# Patient Record
Sex: FEMALE | Race: BLACK OR AFRICAN AMERICAN | NOT HISPANIC OR LATINO | Employment: FULL TIME | URBAN - METROPOLITAN AREA
[De-identification: names, ages, dates, MRNs, and addresses within clinical notes are randomized per-mention and may not be internally consistent; named-entity substitution may affect disease eponyms.]

---

## 2022-11-30 ENCOUNTER — TELEPHONE (OUTPATIENT)
Dept: ENDOCRINOLOGY | Facility: CLINIC | Age: 42
End: 2022-11-30
Payer: COMMERCIAL

## 2022-11-30 NOTE — TELEPHONE ENCOUNTER
----- Message from Carlee Peña sent at 11/30/2022  1:21 PM CST -----  Regarding: Appt Access  Contact: self [phone]  Pt has appt scheduled for 01/11/2023, requesting sooner appt due to the fact she may run out of insulin before then.  No avail appts.  Please call.

## 2022-12-20 ENCOUNTER — OFFICE VISIT (OUTPATIENT)
Dept: ENDOCRINOLOGY | Facility: CLINIC | Age: 42
End: 2022-12-20
Payer: COMMERCIAL

## 2022-12-20 VITALS
HEART RATE: 89 BPM | SYSTOLIC BLOOD PRESSURE: 118 MMHG | WEIGHT: 189 LBS | DIASTOLIC BLOOD PRESSURE: 76 MMHG | TEMPERATURE: 98 F

## 2022-12-20 DIAGNOSIS — E10.9 TYPE 1 DIABETES MELLITUS WITHOUT COMPLICATION: Primary | ICD-10-CM

## 2022-12-20 DIAGNOSIS — E10.649 HYPOGLYCEMIA UNAWARENESS ASSOCIATED WITH TYPE 1 DIABETES MELLITUS: ICD-10-CM

## 2022-12-20 DIAGNOSIS — E88.819 INSULIN RESISTANCE: ICD-10-CM

## 2022-12-20 PROCEDURE — 3078F PR MOST RECENT DIASTOLIC BLOOD PRESSURE < 80 MM HG: ICD-10-PCS | Mod: CPTII,S$GLB,, | Performed by: NURSE PRACTITIONER

## 2022-12-20 PROCEDURE — 1160F RVW MEDS BY RX/DR IN RCRD: CPT | Mod: CPTII,S$GLB,, | Performed by: NURSE PRACTITIONER

## 2022-12-20 PROCEDURE — 3078F DIAST BP <80 MM HG: CPT | Mod: CPTII,S$GLB,, | Performed by: NURSE PRACTITIONER

## 2022-12-20 PROCEDURE — 99204 OFFICE O/P NEW MOD 45 MIN: CPT | Mod: S$GLB,,, | Performed by: NURSE PRACTITIONER

## 2022-12-20 PROCEDURE — 1160F PR REVIEW ALL MEDS BY PRESCRIBER/CLIN PHARMACIST DOCUMENTED: ICD-10-PCS | Mod: CPTII,S$GLB,, | Performed by: NURSE PRACTITIONER

## 2022-12-20 PROCEDURE — 99999 PR PBB SHADOW E&M-EST. PATIENT-LVL III: ICD-10-PCS | Mod: PBBFAC,,, | Performed by: NURSE PRACTITIONER

## 2022-12-20 PROCEDURE — 3074F SYST BP LT 130 MM HG: CPT | Mod: CPTII,S$GLB,, | Performed by: NURSE PRACTITIONER

## 2022-12-20 PROCEDURE — 1159F MED LIST DOCD IN RCRD: CPT | Mod: CPTII,S$GLB,, | Performed by: NURSE PRACTITIONER

## 2022-12-20 PROCEDURE — 1159F PR MEDICATION LIST DOCUMENTED IN MEDICAL RECORD: ICD-10-PCS | Mod: CPTII,S$GLB,, | Performed by: NURSE PRACTITIONER

## 2022-12-20 PROCEDURE — 3074F PR MOST RECENT SYSTOLIC BLOOD PRESSURE < 130 MM HG: ICD-10-PCS | Mod: CPTII,S$GLB,, | Performed by: NURSE PRACTITIONER

## 2022-12-20 PROCEDURE — 99204 PR OFFICE/OUTPT VISIT, NEW, LEVL IV, 45-59 MIN: ICD-10-PCS | Mod: S$GLB,,, | Performed by: NURSE PRACTITIONER

## 2022-12-20 PROCEDURE — 99999 PR PBB SHADOW E&M-EST. PATIENT-LVL III: CPT | Mod: PBBFAC,,, | Performed by: NURSE PRACTITIONER

## 2022-12-20 RX ORDER — INSULIN GLARGINE 100 [IU]/ML
20 INJECTION, SOLUTION SUBCUTANEOUS 2 TIMES DAILY
COMMUNITY
End: 2022-12-20 | Stop reason: SDUPTHER

## 2022-12-20 RX ORDER — GLUCAGON 3 MG/1
POWDER NASAL
Qty: 2 EACH | Refills: 1 | Status: SHIPPED | OUTPATIENT
Start: 2022-12-20 | End: 2022-12-21 | Stop reason: SDUPTHER

## 2022-12-20 RX ORDER — PEN NEEDLE, DIABETIC 30 GX3/16"
1 NEEDLE, DISPOSABLE MISCELLANEOUS
Qty: 550 EACH | Refills: 3 | Status: SHIPPED | OUTPATIENT
Start: 2022-12-20 | End: 2022-12-21 | Stop reason: SDUPTHER

## 2022-12-20 RX ORDER — INSULIN HUMAN 4-8-12(60)
KIT INHALATION
COMMUNITY
End: 2022-12-21 | Stop reason: SDUPTHER

## 2022-12-20 RX ORDER — INSULIN PUMP SYRINGE, 3 ML
EACH MISCELLANEOUS
Qty: 1 EACH | Refills: 0 | Status: SHIPPED | OUTPATIENT
Start: 2022-12-20 | End: 2022-12-21 | Stop reason: SDUPTHER

## 2022-12-20 RX ORDER — INSULIN ASPART INJECTION 100 [IU]/ML
INJECTION, SOLUTION SUBCUTANEOUS
Qty: 15 PEN | Refills: 5 | Status: SHIPPED | OUTPATIENT
Start: 2022-12-20 | End: 2022-12-21 | Stop reason: SDUPTHER

## 2022-12-20 RX ORDER — INSULIN ASPART INJECTION 100 [IU]/ML
INJECTION, SOLUTION SUBCUTANEOUS
COMMUNITY
End: 2022-12-20 | Stop reason: SDUPTHER

## 2022-12-20 RX ORDER — INSULIN GLARGINE 100 [IU]/ML
INJECTION, SOLUTION SUBCUTANEOUS
Qty: 15 ML | Refills: 5 | Status: SHIPPED | OUTPATIENT
Start: 2022-12-20 | End: 2022-12-21 | Stop reason: SDUPTHER

## 2022-12-20 RX ORDER — LANCETS
EACH MISCELLANEOUS
Qty: 150 EACH | Refills: 11 | Status: SHIPPED | OUTPATIENT
Start: 2022-12-20 | End: 2022-12-21 | Stop reason: SDUPTHER

## 2022-12-20 NOTE — PROGRESS NOTES
CC: This 42 y.o. Black or  female  is here for evaluation of  T1DM along with comorbidities indicated in the Visit Diagnosis section of this encounter.    HPI: Leona Tyler was diagnosed with T1DM at age 9. She did use Medtronic insulin pump shortly after but stopped d/t poor glucose control and frequent severe hypoglycemia.     DM COMPLICATIONS: none    New to Endocrine. Presents as a self-referral, 19 minutes late.   She recently moved down from [place]. She was under Endocrine care in [place].   She is rx'd Fiasp ac and Affrezza pc if BG after meal is > 240. Does not feel Fiasp controls BGs well d/t fast and prominent spike after meals. Ran out of Fiasp and has been using Afrezza ac/pc x 2 weeks now. However, BGs are high without Fiasp. Admits diet since moving back here to [place] has been poor, has been taking much higher doses of Fiasp at 10-30 units ac, averages 24 units.   She has gained 20 lb in the last month.     LAST DIABETES EDUCATION: yes     HOSPITALIZED FOR DIABETES  -  Yes - for DKA several ~ 2008 while on insulin pump.    SIGNIFICANT DIABETES MED HISTORY:    PRESCRIBED DIABETES MEDICATIONS:   Basaglar 20 units AM and 30 units PM   Fiasp ac based off ICR of 10  Afrezza 4-12 units if BG after meal is high     Misses medication doses - no but takes Fiasp late as 1-2 hours after meals, especially since work is busy.       SELF MONITORING BLOOD GLUCOSE: Checks blood glucose at home with [employer]ibr[employer]ano[employer] CGM benjie. Gets from [employer].   See Media for report.   CGM interpretation: BGs highly fluctuating with BGs often in the 50s overnight likely d/t aggressive basal insulin dose. Markedly high spikes after lunch, admittedly her biggest meal.     HYPOGLYCEMIC EPISODES: symptoms sometimes do not start until BG is in 20s.   Corrects with high chew candy, keeps glucose handy.      CURRENT DIET: eats 3 meals/day. Drinks water.   Breakfast - fruit, veggie omelette. Does not feel comfortable carb counting.    Biggest meal is lunch.   No snacks after dinner, occasional snacks during the day but not routinely.       CURRENT EXERCISE: sporadic     SOCIAL: has 3 kids, 6 yo and 3 yo twins.     /76   Pulse 89   Temp 98.1 °F (36.7 °C)   Wt 85.7 kg (189 lb)     ROS:   CONSTITUTIONAL: Appetite good, +  fatigue  SKIN: No rash or pruritis   EYES: No visual disturbances  RESPIRATORY: No shortness of breath or cough  CARDIAC: No chest pain or palpitations  GI: No nausea, vomiting, or diarrhea  : No urinary frequency or dysuria   MS: No arthralgias or mylagias   NEURO: +  paresthesias left hand   OTHER: denies polydipsia         PHYSICAL EXAM:  GENERAL: Well developed, well nourished. No acute distress.   PSYCH: AAOx3, appropriate mood and affect, conversant, well-groomed. Judgement and insight good.   NEURO: Cranial nerves grossly intact. Speech clear, no tremor.   NECK: Trachea midline, no thyromegaly or lymphadenopathy.   CHEST: Respirations even and unlabored. CTA bilaterally.  CARDIOVASCULAR: Regular rate and rhythm. No bruits. No murmur. No edema.   ABDOMEN: Soft, non-tender, non-distended. Bowel sounds present.   MS: Gait steady. No clubbing.   SKIN: Normal skin turgor. Skin warm and dry. No areas of breakdown. + nuchal  acanthosis nigricans.        No results found for: HGBA1C    No results found for: CPEPTIDE, GLUTAMICACID, ISLETCELLANT, FRUCTOSAMINE     No results found for: CHOL  No results found for: HDL  No results found for: LDLCALC  No results found for: TRIG  No results found for: CHOLHDL      Chemistry    No results found for: NA, K, CL, CO2, BUN, CREATININE, GLU No results found for: CALCIUM, ALKPHOS, AST, ALT, BILITOT, ESTGFRAFRICA, EGFRNONAA           No results found for: LABMICR, CREATRANDUR, MICALBCREAT          ASSESSMENT and PLAN:    A1C GOAL: < 7 % if no hypoglycemia     1. Type 1 diabetes mellitus without complication  Patient with type 1 DM with insulin resistance, indicated by high insulin  needs and + nuchal acanthosis nigricans.     Advised pt:   Biggest barrier to diabetes control is high fat/high carb diet. Encouraged patient to improve eating habits.       Use carb ratio of 7; correction factor 20 with goal of 120. - handout provided to explain how to calculate in further detail.     Download Calorie Osman benjie to help with carb counting. An insulin dose calculator benjie was also suggested.   Reduce Basaglar dose by 20%, so take 20 units twice daily.   Discussed changing Fiasp back to Novolog but pt decided to stay on Fiasp.       Return to clinic in 3 months with labs prior. She declines getting a1c done today. Will request records from last Endocrine provider.     Hemoglobin A1C    Microalbumin/Creatinine Ratio, Urine    Lipid Panel    Creatinine, Serum    TSH    Celiac Disease Panel    C-Peptide    Glucose, Fasting    Glutamic Acid Decarboxylase    Anti-islet cell antibody      2. Hypoglycemia unawareness associated with type 1 diabetes mellitus  glucagon (BAQSIMI) 3 mg/actuation Spry  Continue CGM with low alerts.   Reduce Basaglar as above.       3. Insulin resistance  As above          Orders Placed This Encounter   Procedures    Hemoglobin A1C     Standing Status:   Future     Standing Expiration Date:   2/18/2024    Microalbumin/Creatinine Ratio, Urine     Standing Status:   Future     Standing Expiration Date:   2/18/2024     Order Specific Question:   Specimen Source     Answer:   Urine    Lipid Panel     Standing Status:   Future     Standing Expiration Date:   12/20/2023    Creatinine, Serum     Standing Status:   Future     Standing Expiration Date:   2/18/2024    TSH     Standing Status:   Future     Standing Expiration Date:   2/18/2024    Celiac Disease Panel     Standing Status:   Future     Standing Expiration Date:   2/18/2024    C-Peptide     Standing Status:   Future     Standing Expiration Date:   2/18/2024    Glucose, Fasting     Standing Status:   Future     Standing Expiration  Date:   2/18/2024    Glutamic Acid Decarboxylase     Standing Status:   Future     Standing Expiration Date:   2/18/2024    Anti-islet cell antibody     Standing Status:   Future     Standing Expiration Date:   2/18/2024        Follow up in about 3 months (around 3/20/2023).     Thank you very much for allowing me to participate in Leona Tyler's care.

## 2022-12-20 NOTE — PATIENT INSTRUCTIONS
Reduce Basaglar dose by 20%, so take 20 units twice daily.   Discussed changing Fiasp back to Novolog but pt decided to stay on Fiasp.     Use carb ratio of 7; correction factor 20 with goal of 120.     Download [employer] benjie to help with carb counting.     Biggest barrier to diabetes control is high fat/high carb diet. Encouraged patient to improve eating habits.     Return to clinic in 3 months with labs prior.

## 2022-12-21 ENCOUNTER — PATIENT MESSAGE (OUTPATIENT)
Dept: ENDOCRINOLOGY | Facility: CLINIC | Age: 42
End: 2022-12-21
Payer: COMMERCIAL

## 2022-12-21 DIAGNOSIS — E10.649 HYPOGLYCEMIA UNAWARENESS ASSOCIATED WITH TYPE 1 DIABETES MELLITUS: Primary | ICD-10-CM

## 2022-12-21 DIAGNOSIS — E10.9 TYPE 1 DIABETES MELLITUS WITHOUT COMPLICATION: Primary | ICD-10-CM

## 2022-12-21 RX ORDER — INSULIN ASPART INJECTION 100 [IU]/ML
INJECTION, SOLUTION SUBCUTANEOUS
Qty: 35 PEN | Refills: 1 | Status: SHIPPED | OUTPATIENT
Start: 2022-12-21 | End: 2023-01-19 | Stop reason: SDUPTHER

## 2022-12-21 RX ORDER — GLUCAGON 3 MG/1
POWDER NASAL
Qty: 2 EACH | Refills: 1 | Status: SHIPPED | OUTPATIENT
Start: 2022-12-21

## 2022-12-21 RX ORDER — INSULIN PUMP SYRINGE, 3 ML
EACH MISCELLANEOUS
Qty: 1 EACH | Refills: 0 | Status: SHIPPED | OUTPATIENT
Start: 2022-12-21 | End: 2023-02-28

## 2022-12-21 RX ORDER — PEN NEEDLE, DIABETIC 30 GX3/16"
1 NEEDLE, DISPOSABLE MISCELLANEOUS
Qty: 550 EACH | Refills: 3 | Status: SHIPPED | OUTPATIENT
Start: 2022-12-21 | End: 2023-09-26 | Stop reason: SDUPTHER

## 2022-12-21 RX ORDER — INSULIN GLARGINE 100 [IU]/ML
INJECTION, SOLUTION SUBCUTANEOUS
Qty: 30 ML | Refills: 1 | Status: SHIPPED | OUTPATIENT
Start: 2022-12-21 | End: 2023-06-13 | Stop reason: SDUPTHER

## 2022-12-21 RX ORDER — INSULIN HUMAN 4-8-12(60)
KIT INHALATION
Status: CANCELLED | OUTPATIENT
Start: 2022-12-21

## 2022-12-21 RX ORDER — INSULIN HUMAN 4-8-12(60)
KIT INHALATION
Qty: 3 EACH | Refills: 1 | Status: SHIPPED | OUTPATIENT
Start: 2022-12-21 | End: 2023-09-26

## 2022-12-21 RX ORDER — LANCETS
EACH MISCELLANEOUS
Qty: 150 EACH | Refills: 11 | Status: SHIPPED | OUTPATIENT
Start: 2022-12-21 | End: 2023-02-28

## 2022-12-26 ENCOUNTER — PATIENT MESSAGE (OUTPATIENT)
Dept: ENDOCRINOLOGY | Facility: CLINIC | Age: 42
End: 2022-12-26
Payer: COMMERCIAL

## 2023-01-09 ENCOUNTER — PATIENT MESSAGE (OUTPATIENT)
Dept: DIABETES | Facility: CLINIC | Age: 43
End: 2023-01-09
Payer: COMMERCIAL

## 2023-01-19 DIAGNOSIS — E10.649 HYPOGLYCEMIA UNAWARENESS ASSOCIATED WITH TYPE 1 DIABETES MELLITUS: ICD-10-CM

## 2023-01-19 RX ORDER — INSULIN ASPART INJECTION 100 [IU]/ML
INJECTION, SOLUTION SUBCUTANEOUS
Qty: 35 PEN | Refills: 1 | Status: SHIPPED | OUTPATIENT
Start: 2023-01-19 | End: 2023-09-26 | Stop reason: SDUPTHER

## 2023-01-21 ENCOUNTER — PATIENT MESSAGE (OUTPATIENT)
Dept: ENDOCRINOLOGY | Facility: CLINIC | Age: 43
End: 2023-01-21
Payer: COMMERCIAL

## 2023-01-21 DIAGNOSIS — E10.649 HYPOGLYCEMIA UNAWARENESS ASSOCIATED WITH TYPE 1 DIABETES MELLITUS: ICD-10-CM

## 2023-02-24 ENCOUNTER — TELEPHONE (OUTPATIENT)
Dept: ENDOCRINOLOGY | Facility: CLINIC | Age: 43
End: 2023-02-24
Payer: COMMERCIAL

## 2023-02-24 DIAGNOSIS — E10.649 HYPOGLYCEMIA UNAWARENESS ASSOCIATED WITH TYPE 1 DIABETES MELLITUS: ICD-10-CM

## 2023-02-24 NOTE — TELEPHONE ENCOUNTER
----- Message from Raquel Peguero NP sent at 2/23/2023  4:31 PM CST -----  Orders for tslim pump received. I will hold off on signing these until she is cleared by Diabetes Education after insulin pump evaluation.     She has appt for Education soon but that's not for insulin pump eval. She should reschedule.

## 2023-02-24 NOTE — TELEPHONE ENCOUNTER
Spoke to patient, evaluation appointment scheduled.  Pt states that she wasn't able to get her strips, would like the prescription resent to [facility] pharmacy

## 2023-02-24 NOTE — TELEPHONE ENCOUNTER
----- Message from Brigette Martinez sent at 2/24/2023 11:56 AM CST -----  Regarding: Return Call  .Type:  Patient Returning Call    Who Called Self     Who Left Message for Patient: PRASANNA    Does the patient know what this is regarding?: NO    Would the patient rather a call back or a response via [facility]? CALL    Best Call Back Number: .[phone]

## 2023-02-27 ENCOUNTER — PATIENT MESSAGE (OUTPATIENT)
Dept: ENDOCRINOLOGY | Facility: CLINIC | Age: 43
End: 2023-02-27
Payer: COMMERCIAL

## 2023-02-27 DIAGNOSIS — E10.649 HYPOGLYCEMIA UNAWARENESS ASSOCIATED WITH TYPE 1 DIABETES MELLITUS: ICD-10-CM

## 2023-02-28 RX ORDER — INSULIN PUMP SYRINGE, 3 ML
EACH MISCELLANEOUS
Qty: 1 EACH | Refills: 0 | Status: SHIPPED | OUTPATIENT
Start: 2023-02-28 | End: 2024-02-28

## 2023-02-28 RX ORDER — LANCETS
EACH MISCELLANEOUS
Qty: 150 EACH | Refills: 11 | Status: SHIPPED | OUTPATIENT
Start: 2023-02-28

## 2023-03-01 ENCOUNTER — TELEPHONE (OUTPATIENT)
Dept: DIABETES | Facility: CLINIC | Age: 43
End: 2023-03-01
Payer: COMMERCIAL

## 2023-03-16 ENCOUNTER — TELEPHONE (OUTPATIENT)
Dept: ENDOCRINOLOGY | Facility: CLINIC | Age: 43
End: 2023-03-16
Payer: COMMERCIAL

## 2023-03-16 DIAGNOSIS — E10.649 HYPOGLYCEMIA UNAWARENESS ASSOCIATED WITH TYPE 1 DIABETES MELLITUS: ICD-10-CM

## 2023-03-16 NOTE — TELEPHONE ENCOUNTER
----- Message from Shikha Ulises sent at 3/16/2023  2:56 PM CDT -----  Regarding: Brigette Hogan  .Type: Patient Call Back    Who called: Brigette Hogan    What is the request in detail: Requesting to get a new rx stating what type of blood sugar diagnostic Strp pt needs due to guidelines     Can the clinic reply by [facility]? Call back     Would the patient rather a call back or a response via [facility]? Call back     Best call back number .  [phone]

## 2023-03-17 ENCOUNTER — TELEPHONE (OUTPATIENT)
Dept: ENDOCRINOLOGY | Facility: CLINIC | Age: 43
End: 2023-03-17
Payer: COMMERCIAL

## 2023-03-21 ENCOUNTER — TELEPHONE (OUTPATIENT)
Dept: ENDOCRINOLOGY | Facility: CLINIC | Age: 43
End: 2023-03-21
Payer: COMMERCIAL

## 2023-03-21 NOTE — TELEPHONE ENCOUNTER
----- Message from Raquel Peguero NP sent at 3/21/2023  2:12 PM CDT -----  Contact: [facility]  opt [address] ref# [id number]  Please clarify with pharmacy that pt uses both Fiasp and Afrezza. Fiasp is taken before meals and Afrezza is taken for high glucoses.   ----- Message -----  From: Veto Castaneda LPN  Sent: 3/21/2023  11:23 AM CDT  To: Raquel Peguero NP      ----- Message -----  From: Kenia Burr  Sent: 3/21/2023  11:08 AM CDT  To: Marielena García Staff    Pharmacy is calling to clarify an RX.  RX name:  insulin regular human (AFREZZA) 4 unit/8 unit/ 12 unit (60) CtDv  What do they need to clarify:  duplicate therapy with insulin aspart, niacinamide, (FIASP FLEXTOUCH U-100 INSULIN) 100 unit/mL (3 mL) InPn  Comments:

## 2023-03-21 NOTE — TELEPHONE ENCOUNTER
Spoke to [facility] pharmacy, informed that the pt is on both Afreeza and Fiasp

## 2023-04-06 ENCOUNTER — OFFICE VISIT (OUTPATIENT)
Dept: ENDOCRINOLOGY | Facility: CLINIC | Age: 43
End: 2023-04-06
Payer: COMMERCIAL

## 2023-04-06 VITALS
HEART RATE: 77 BPM | SYSTOLIC BLOOD PRESSURE: 123 MMHG | DIASTOLIC BLOOD PRESSURE: 85 MMHG | WEIGHT: 189 LBS | TEMPERATURE: 98 F

## 2023-04-06 DIAGNOSIS — E10.9 TYPE 1 DIABETES MELLITUS WITHOUT COMPLICATION: Primary | ICD-10-CM

## 2023-04-06 DIAGNOSIS — E10.649 HYPOGLYCEMIA UNAWARENESS ASSOCIATED WITH TYPE 1 DIABETES MELLITUS: ICD-10-CM

## 2023-04-06 PROCEDURE — 1160F PR REVIEW ALL MEDS BY PRESCRIBER/CLIN PHARMACIST DOCUMENTED: ICD-10-PCS | Mod: CPTII,S$GLB,, | Performed by: NURSE PRACTITIONER

## 2023-04-06 PROCEDURE — 99214 OFFICE O/P EST MOD 30 MIN: CPT | Mod: S$GLB,,, | Performed by: NURSE PRACTITIONER

## 2023-04-06 PROCEDURE — 99214 PR OFFICE/OUTPT VISIT, EST, LEVL IV, 30-39 MIN: ICD-10-PCS | Mod: S$GLB,,, | Performed by: NURSE PRACTITIONER

## 2023-04-06 PROCEDURE — 1160F RVW MEDS BY RX/DR IN RCRD: CPT | Mod: CPTII,S$GLB,, | Performed by: NURSE PRACTITIONER

## 2023-04-06 PROCEDURE — 3079F PR MOST RECENT DIASTOLIC BLOOD PRESSURE 80-89 MM HG: ICD-10-PCS | Mod: CPTII,S$GLB,, | Performed by: NURSE PRACTITIONER

## 2023-04-06 PROCEDURE — 1159F PR MEDICATION LIST DOCUMENTED IN MEDICAL RECORD: ICD-10-PCS | Mod: CPTII,S$GLB,, | Performed by: NURSE PRACTITIONER

## 2023-04-06 PROCEDURE — 99999 PR PBB SHADOW E&M-EST. PATIENT-LVL III: CPT | Mod: PBBFAC,,, | Performed by: NURSE PRACTITIONER

## 2023-04-06 PROCEDURE — 99999 PR PBB SHADOW E&M-EST. PATIENT-LVL III: ICD-10-PCS | Mod: PBBFAC,,, | Performed by: NURSE PRACTITIONER

## 2023-04-06 PROCEDURE — 3074F SYST BP LT 130 MM HG: CPT | Mod: CPTII,S$GLB,, | Performed by: NURSE PRACTITIONER

## 2023-04-06 PROCEDURE — 3074F PR MOST RECENT SYSTOLIC BLOOD PRESSURE < 130 MM HG: ICD-10-PCS | Mod: CPTII,S$GLB,, | Performed by: NURSE PRACTITIONER

## 2023-04-06 PROCEDURE — 3079F DIAST BP 80-89 MM HG: CPT | Mod: CPTII,S$GLB,, | Performed by: NURSE PRACTITIONER

## 2023-04-06 PROCEDURE — 1159F MED LIST DOCD IN RCRD: CPT | Mod: CPTII,S$GLB,, | Performed by: NURSE PRACTITIONER

## 2023-04-06 RX ORDER — BLOOD-GLUCOSE SENSOR
EACH MISCELLANEOUS
Qty: 3 EACH | Refills: 11 | Status: SHIPPED | OUTPATIENT
Start: 2023-04-06 | End: 2023-06-16 | Stop reason: SDUPTHER

## 2023-04-06 RX ORDER — BLOOD-GLUCOSE TRANSMITTER
EACH MISCELLANEOUS
Qty: 1 EACH | Refills: 3 | Status: SHIPPED | OUTPATIENT
Start: 2023-04-06 | End: 2023-06-16 | Stop reason: SDUPTHER

## 2023-04-06 NOTE — PROGRESS NOTES
CC: This 42 y.o. Black or  female  is here for evaluation of  T1DM along with comorbidities indicated in the Visit Diagnosis section of this encounter.    HPI: Leona Tyler was diagnosed with T1DM at age 9. She did use Medtronic insulin pump shortly after but stopped d/t poor glucose control and frequent severe hypoglycemia.     DM COMPLICATIONS: none    Initial visit 12/2022  New to Endocrine. Presents as a self-referral, 19 minutes late.   She recently moved down from [place]. She was under Endocrine care in [place].   She is rx'd Fiasp ac and Affrezza pc if BG after meal is > 240. Does not feel Fiasp controls BGs well d/t fast and prominent spike after meals. Ran out of Fiasp and has been using Afrezza ac/pc x 2 weeks now. However, BGs are high without Fiasp. Admits diet since moving back here to [place] has been poor, has been taking much higher doses of Fiasp at 10-30 units ac, averages 24 units.   She has gained 20 lb in the last month.   Plan Patient with type 1 DM with insulin resistance, indicated by high insulin needs and + nuchal acanthosis nigricans.   Advised pt:   Biggest barrier to diabetes control is high fat/high carb diet. Encouraged patient to improve eating habits.   Use carb ratio of 7; correction factor 20 with goal of 120. - handout provided to explain how to calculate in further detail.   Download Calorie Osman benjie to help with carb counting. An insulin dose calculator benjie was also suggested.   Reduce Basaglar dose by 20%, so take 20 units twice daily.   Discussed changing Fiasp back to Novolog but pt decided to stay on Fiasp.   Return to clinic in 3 months with labs prior. She declines getting a1c done today. Will request records from last Endocrine provider.     Interval hx  No recent labs available d/t cost. Requests labs at [facility] She does not know what her BGs have been. Finally got her supplies last week but still has not been testing BGs.     She is trying to control her  food portions better, choosing more vegetables/lower carb options.         LAST DIABETES EDUCATION: yes     HOSPITALIZED FOR DIABETES  -  Yes - for DKA several times; last episode was ~ 2008 while on insulin pump.    SIGNIFICANT DIABETES MED HISTORY:    PRESCRIBED DIABETES MEDICATIONS:   Basaglar 40 units hs    Fiasp ac based off ICR of 4  Afrezza 4-12 units if BG after meal is high > 220      Misses medication doses - denies       SELF MONITORING BLOOD GLUCOSE:  previously used Libre2 CGM with phone benjie, stopped when supplies ran out.   Has the One Touch Verio Reflect meter.     HYPOGLYCEMIC EPISODES: more frequent now with lifestyle changes. Earlier this week she felt her BG was low but didn't test BG     + hypoglycemia unawareness; symptoms sometimes do not start until BG is in 20s.   Corrects with high chew candy, keeps glucose handy.      Glucagon kit at home - yes, Baqsimi     CURRENT DIET: eats 3 meals/day. Drinks water.   does not feel comfortable carb counting.   Biggest meal is lunch.   No snacks after dinner, occasional snacks during the day but not routinely.       CURRENT EXERCISE: tries to walk 10k steps per day, workout videos 3x/week      SOCIAL: has 3 kids, 6 yo and 3 yo twins.       /85   Pulse 77   Temp 98.2 °F (36.8 °C)   Wt 85.7 kg (189 lb)     ROS:   CONSTITUTIONAL: Appetite good, +  fatigue - improved   Other: denies polydipsia, no dysuria         PHYSICAL EXAM:  GENERAL: Well developed, well nourished. No acute distress.   PSYCH: AAOx3, appropriate mood and affect, conversant, well-groomed. Judgement and insight good.   NEURO: Cranial nerves grossly intact. Speech clear, no tremor.   CHEST: Respirations even and unlabored.     No results found for: HGBA1C    No results found for: CPEPTIDE, GLUTAMICACID, ISLETCELLANT, FRUCTOSAMINE     No results found for: CHOL  No results found for: HDL  No results found for: LDLCALC  No results found for: TRIG  No results found for: CHOLHDL       Chemistry    No results found for: NA, K, CL, CO2, BUN, CREATININE, GLU No results found for: CALCIUM, ALKPHOS, AST, ALT, BILITOT, ESTGFRAFRICA, EGFRNONAA           No results found for: LABMICR, CREATRANDUR, MICALBCREAT          ASSESSMENT and PLAN:    A1C GOAL: < 7 % if no hypoglycemia     1. Type 1 diabetes mellitus without complication  Labs today ordered for [facility].   Orders for Dexcom G6 sent to [facility]. Contact office if unable to obtain.   Needs to be on CGM with low alert asap due to h/o hypoglycemia unawareness.   Until CGM can be started do make sure to test glucose at least 4x/day.     See Diabetes Education for insulin pump evaluation.   Unable to evaluate glucose control and adjust insulin therapy since no glucose data or a1c is available.     Keep appointment with Danita Lipscomb, diabetes NP, in [place] in May and continue diabetes management there.  -- more convenient location to patient.       Glutamic Acid Decarboxylase    C-Peptide    Glucose, Fasting    HEMOGLOBIN A1C    TSH    Lipid Panel    Microalbumin/Creatinine Ratio, Urine    Glutamic Acid Decarboxylase    C-Peptide    Glucose, Fasting    HEMOGLOBIN A1C    TSH    Lipid Panel    Microalbumin/Creatinine Ratio, Urine      2. Hypoglycemia unawareness associated with type 1 diabetes mellitus  As above              Orders Placed This Encounter   Procedures    Glutamic Acid Decarboxylase     Standing Status:   Future     Number of Occurrences:   1     Standing Expiration Date:   6/4/2024    C-Peptide     Standing Status:   Future     Number of Occurrences:   1     Standing Expiration Date:   6/4/2024    Glucose, Fasting     Standing Status:   Future     Number of Occurrences:   1     Standing Expiration Date:   6/4/2024    HEMOGLOBIN A1C     Standing Status:   Future     Number of Occurrences:   1     Standing Expiration Date:   6/4/2024    TSH     Standing Status:   Future     Number of Occurrences:   1     Standing Expiration Date:   6/4/2024     Lipid Panel     Standing Status:   Future     Number of Occurrences:   1     Standing Expiration Date:   4/5/2024    Microalbumin/Creatinine Ratio, Urine     Standing Status:   Future     Number of Occurrences:   1     Standing Expiration Date:   6/4/2024     Order Specific Question:   Specimen Source     Answer:   Urine        No follow-ups on file.

## 2023-04-06 NOTE — PATIENT INSTRUCTIONS
Labs today ordered for [facility].   Orders for Dexcom G6 sent to [facility]. Contact office if unable to obtain.   Needs to be on CGM with low alert asap due to h/o hypoglycemia unawareness.   Until CGM can be started do make sure to test glucose at least 4x/day.     See Diabetes Education for insulin pump evaluation.   Unable to evaluate glucose control and adjust insulin therapy since no glucose data or a1c is available.     Keep appointment with Danita Lipscomb, diabetes NP, in [place] in May and continue diabetes management there.  -- more convenient location to patient.

## 2023-05-01 ENCOUNTER — OCCUPATIONAL HEALTH (OUTPATIENT)
Dept: URGENT CARE | Facility: CLINIC | Age: 43
End: 2023-05-01
Payer: COMMERCIAL

## 2023-05-01 DIAGNOSIS — Z13.9 ENCOUNTER FOR SCREENING: Primary | ICD-10-CM

## 2023-05-01 PROCEDURE — 80305 DRUG TEST PRSMV DIR OPT OBS: CPT | Mod: S$GLB,,, | Performed by: PHYSICIAN ASSISTANT

## 2023-05-01 PROCEDURE — 80305 MEDTOX HAIR COLLECTION ONLY: ICD-10-PCS | Mod: S$GLB,,, | Performed by: PHYSICIAN ASSISTANT

## 2023-05-18 ENCOUNTER — OCCUPATIONAL HEALTH (OUTPATIENT)
Dept: URGENT CARE | Facility: CLINIC | Age: 43
End: 2023-05-18

## 2023-05-18 DIAGNOSIS — Z13.9 ENCOUNTER FOR SCREENING: Primary | ICD-10-CM

## 2023-05-18 PROCEDURE — 80305 DRUG TEST PRSMV DIR OPT OBS: CPT | Mod: S$GLB,,, | Performed by: PHYSICIAN ASSISTANT

## 2023-05-18 PROCEDURE — 80305 MEDTOX HAIR COLLECTION ONLY: ICD-10-PCS | Mod: S$GLB,,, | Performed by: PHYSICIAN ASSISTANT

## 2023-06-05 ENCOUNTER — TELEPHONE (OUTPATIENT)
Dept: OTOLARYNGOLOGY | Facility: CLINIC | Age: 43
End: 2023-06-05
Payer: COMMERCIAL

## 2023-06-05 DIAGNOSIS — M79.641 PAIN IN BOTH HANDS: Primary | ICD-10-CM

## 2023-06-05 DIAGNOSIS — M79.642 PAIN IN BOTH HANDS: Primary | ICD-10-CM

## 2023-06-06 ENCOUNTER — OFFICE VISIT (OUTPATIENT)
Dept: ORTHOPEDICS | Facility: CLINIC | Age: 43
End: 2023-06-06
Payer: COMMERCIAL

## 2023-06-06 VITALS
DIASTOLIC BLOOD PRESSURE: 68 MMHG | WEIGHT: 191.13 LBS | HEIGHT: 67 IN | SYSTOLIC BLOOD PRESSURE: 96 MMHG | HEART RATE: 85 BPM | BODY MASS INDEX: 30 KG/M2

## 2023-06-06 DIAGNOSIS — M65.331 TRIGGER FINGER, RIGHT MIDDLE FINGER: Primary | ICD-10-CM

## 2023-06-06 PROCEDURE — 3078F DIAST BP <80 MM HG: CPT | Mod: CPTII,S$GLB,,

## 2023-06-06 PROCEDURE — 3074F PR MOST RECENT SYSTOLIC BLOOD PRESSURE < 130 MM HG: ICD-10-PCS | Mod: CPTII,S$GLB,,

## 2023-06-06 PROCEDURE — 99999 PR PBB SHADOW E&M-EST. PATIENT-LVL III: ICD-10-PCS | Mod: PBBFAC,,,

## 2023-06-06 PROCEDURE — 1159F MED LIST DOCD IN RCRD: CPT | Mod: CPTII,S$GLB,,

## 2023-06-06 PROCEDURE — 99203 PR OFFICE/OUTPT VISIT, NEW, LEVL III, 30-44 MIN: ICD-10-PCS | Mod: 25,S$GLB,,

## 2023-06-06 PROCEDURE — 3008F PR BODY MASS INDEX (BMI) DOCUMENTED: ICD-10-PCS | Mod: CPTII,S$GLB,,

## 2023-06-06 PROCEDURE — 20550 NJX 1 TENDON SHEATH/LIGAMENT: CPT | Mod: RT,S$GLB,,

## 2023-06-06 PROCEDURE — 3008F BODY MASS INDEX DOCD: CPT | Mod: CPTII,S$GLB,,

## 2023-06-06 PROCEDURE — 3078F PR MOST RECENT DIASTOLIC BLOOD PRESSURE < 80 MM HG: ICD-10-PCS | Mod: CPTII,S$GLB,,

## 2023-06-06 PROCEDURE — 20550 PR INJECT TENDON SHEATH/LIGAMENT: ICD-10-PCS | Mod: RT,S$GLB,,

## 2023-06-06 PROCEDURE — 99203 OFFICE O/P NEW LOW 30 MIN: CPT | Mod: 25,S$GLB,,

## 2023-06-06 PROCEDURE — 1159F PR MEDICATION LIST DOCUMENTED IN MEDICAL RECORD: ICD-10-PCS | Mod: CPTII,S$GLB,,

## 2023-06-06 PROCEDURE — 3074F SYST BP LT 130 MM HG: CPT | Mod: CPTII,S$GLB,,

## 2023-06-06 PROCEDURE — 99999 PR PBB SHADOW E&M-EST. PATIENT-LVL III: CPT | Mod: PBBFAC,,,

## 2023-06-06 RX ORDER — METHYLPREDNISOLONE ACETATE 40 MG/ML
40 INJECTION, SUSPENSION INTRA-ARTICULAR; INTRALESIONAL; INTRAMUSCULAR; SOFT TISSUE
Status: COMPLETED | OUTPATIENT
Start: 2023-06-06 | End: 2023-06-06

## 2023-06-06 RX ORDER — LANCETS 33 GAUGE
EACH MISCELLANEOUS
COMMUNITY
Start: 2023-03-14

## 2023-06-06 RX ADMIN — METHYLPREDNISOLONE ACETATE 40 MG: 40 INJECTION, SUSPENSION INTRA-ARTICULAR; INTRALESIONAL; INTRAMUSCULAR; SOFT TISSUE at 09:06

## 2023-06-06 RX ADMIN — METHYLPREDNISOLONE ACETATE 40 MG: 40 INJECTION, SUSPENSION INTRA-ARTICULAR; INTRALESIONAL; INTRAMUSCULAR; SOFT TISSUE at 08:06

## 2023-06-06 NOTE — PROCEDURES
Right midddle trigger finger #1    Date/Time: 6/6/2023 8:30 AM  Performed by: Denise Michel PA-C  Authorized by: Denise Michel PA-C     Consent Done?:  Yes (Verbal)  Indications:  Pain  Site marked: the procedure site was marked    Timeout: prior to procedure the correct patient, procedure, and site was verified    Prep: patient was prepped and draped in usual sterile fashion      Local anesthesia used?: Yes    Local anesthetic:  Bupivacaine 0.25% without epinephrine and topical anesthetic (Topical spray prior to injection and 1cc 1% plain lidocaine in the injectate)  Anesthetic total (ml):  1    Location:  Long finger  Ultrasonic guidance for needle placement?: No    Needle size:  25 G  Approach:  Volar  Medications:  40 mg carpal tunnel/trigger finger  Patient tolerance:  Patient tolerated the procedure well with no immediate complications

## 2023-06-06 NOTE — PROGRESS NOTES
SUBJECTIVE:      Chief Complaint: right middle trigger finger    History of Present Illness:  Patient is a 42 y.o. right hand dominant female who presents today with complaints of right middle trigger finger.  Patient reports a history this her left hand as well as carpal tunnel syndrome.   Reports this started about 6 months ago and has progressively worsened.  No known MILEY.  Reports her middle finger intermittently locks and she has pain at the base of her finger.  She is a diabetic, last A1c unknown.  She is interested in an injection today.     The patient is a/an .    Onset of symptoms/DOI was 6 months prior.    Symptoms are aggravated by movement.    Symptoms are alleviated by rest.    Symptoms consist of locking and pain.    The patient rates their pain as a 4/10.    Attempted treatment(s) and/or interventions include activity modifications, rest, rest.     The patient denies any fevers, chills, N/V, D/C and presents for evaluation.       Past Medical History:   Diagnosis Date    Diabetes mellitus type I      Past Surgical History:   Procedure Laterality Date    CARPAL TUNNEL RELEASE Right      SECTION      MYOMECTOMY       Review of patient's allergies indicates:  No Known Allergies  Social History     Social History Narrative    ** Merged History Encounter **          Family History   Problem Relation Age of Onset    Diabetes Mother         type 2    Diabetes Father         type 1    Diabetes Maternal Grandmother         type 2    Diabetes Paternal Grandmother         type 1         Current Outpatient Medications:     blood sugar diagnostic Strp, To check BG 4 times daily, to use with insurance preferred meter, Disp: 400 each, Rfl: 3    blood-glucose meter kit, To check BG 4 times daily, to use with insurance preferred meter, Disp: 1 each, Rfl: 0    blood-glucose sensor (DEXCOM G6 SENSOR) Janny, Change sensor every 10 days, Disp: 3 each, Rfl: 11    blood-glucose transmitter (DEXCOM G6  "TRANSMITTER) Janny, Change every 3 months, Disp: 1 each, Rfl: 3    glucagon (BAQSIMI) 3 mg/actuation Spry, Use as needed for severe hypoglycemia, Disp: 2 each, Rfl: 1    insulin (BASAGLAR KWIKPEN U-100 INSULIN) glargine 100 units/mL SubQ pen, Inject 20 units twice daily, Disp: 30 mL, Rfl: 1    insulin aspart, niacinamide, (FIASP FLEXTOUCH U-100 INSULIN) 100 unit/mL (3 mL) InPn, Inject before meals based off carb ratio 7, correction factor 20 and glucose goal 120. Max daily dose 120 units, Disp: 35 pen, Rfl: 1    insulin regular human (AFREZZA) 4 unit/8 unit/ 12 unit (60) CtDv, Inhale 4-16 units three times daily and additional units as needed for glucose control. TDD 48 units, Disp: 3 each, Rfl: 1    lancets Misc, To check BG 4 times daily, to use with insurance preferred meter, Disp: 150 each, Rfl: 11    pen needle, diabetic (BD ULTRA-FINE IZZY PEN NEEDLE) 32 gauge x 5/32" Ndle, 1 Device by Misc.(Non-Drug; Combo Route) route 6 (six) times daily., Disp: 550 each, Rfl: 3      Review of Systems:  Constitutional: no fever or chills  Eyes: no visual changes  ENT: no nasal congestion or sore throat  Respiratory: no cough or shortness of breath  Cardiovascular: no chest pain  Gastrointestinal: no nausea or vomiting, tolerating diet  Musculoskeletal: pain and soreness    OBJECTIVE:      Vital Signs (Most Recent):  There were no vitals filed for this visit.  There is no height or weight on file to calculate BMI.      Physical Exam:  Constitutional: The patient appears well-developed and well-nourished. No distress.   Skin: No lesions appreciated  Head: Normocephalic and atraumatic.   Nose: Nose normal.   Ears: No deformities seen  Eyes: Conjunctivae and EOM are normal.   Neck: No tracheal deviation present.   Cardiovascular: Normal rate and intact distal pulses.    Pulmonary/Chest: Effort normal. No respiratory distress.   Abdominal: There is no guarding.   Neurological: The patient is alert.   Psychiatric: The patient has a " normal mood and affect.     Right Hand/Wrist Examination:    Observation/Inspection:  Swelling  none    Deformity  none  Discoloration  none     Scars   none    Atrophy  none    HAND/WRIST EXAMINATION:  Finkelstein's Test   Neg  WHAT Test    Neg  Snuff box tenderness   Neg  Scott's Test    Neg  Hook of Hamate Tenderness  Neg  CMC grind    Neg  Circumduction test   Neg  TTP at long finger A1 pulley    Neurovascular Exam:  Digits WWP, brisk CR < 3s throughout  NVI motor/LTS to M/R/U nerves, radial pulse 2+  Tinel's Test - Carpal Tunnel  Neg  Tinel's Test - Cubital Tunnel  Neg  Phalen's Test    Neg  Median Nerve Compression Test Neg    ROM hand full, pain with long finger extension, locking noted on exam    ROM wrist full, painless    ROM elbow full, painless    Abdomen not guarded  Respirations nonlabored  Perfusion intact    Diagnostic Results:     Imaging - I independently viewed the patient's imaging as well as the radiology report.  Xrays of the patient's bilateral hands  demonstrate no evidence of any obvious acute fractures or dislocations or significant degenerative changes.      ASSESSMENT/PLAN:      1. Trigger finger, right middle finger  methylPREDNISolone acetate injection 40 mg    right midddle trigger finger #1            I made the decision to obtain old records of the patient including previous notes and imaging. If new imaging was ordered today of the extremity or extremities evaluated. I independently reviewed and interpreted the x-rays as well as prior imaging. Reviewed imaging in detail with patient.     We discussed at length different treatment options including conservative vs surgical management. These include anti-inflammatories, acetaminophen, rest, ice, heat, formal physical therapy including strengthening and stretching exercises, home exercise programs, injections, and finally surgical intervention.      We will proceed today with trial of right middle finger injection.  Trigger finger  injection given.  Post-injection instructions reviewed. Patient was advised to monitor her blood sugars closely over the next several days. The steroid may cause a rise in them. If her glucose levels rise to a point she is uncomfortable or she is unable to control them she is to contact her PCP or go immediately to the emergency department.     She was also given an oval 8 splint to wear as tolerated.  She will contact the clinic if she would like to proceed with either trigger finger release or carpal tunnel release as needed.    Follow up: prn  Xrays needed: none     All of the patient's questions were answered and the patient will contact us if they have any questions or concerns in the interim.

## 2023-06-12 ENCOUNTER — PATIENT MESSAGE (OUTPATIENT)
Dept: ENDOCRINOLOGY | Facility: CLINIC | Age: 43
End: 2023-06-12
Payer: COMMERCIAL

## 2023-06-12 DIAGNOSIS — E10.649 HYPOGLYCEMIA UNAWARENESS ASSOCIATED WITH TYPE 1 DIABETES MELLITUS: ICD-10-CM

## 2023-06-14 RX ORDER — INSULIN GLARGINE 100 [IU]/ML
INJECTION, SOLUTION SUBCUTANEOUS
Qty: 30 ML | Refills: 1 | Status: SHIPPED | OUTPATIENT
Start: 2023-06-14 | End: 2023-09-26

## 2023-06-16 ENCOUNTER — TELEPHONE (OUTPATIENT)
Dept: PHARMACY | Facility: CLINIC | Age: 43
End: 2023-06-16
Payer: COMMERCIAL

## 2023-06-16 ENCOUNTER — OFFICE VISIT (OUTPATIENT)
Dept: ENDOCRINOLOGY | Facility: CLINIC | Age: 43
End: 2023-06-16
Payer: COMMERCIAL

## 2023-06-16 VITALS
SYSTOLIC BLOOD PRESSURE: 117 MMHG | BODY MASS INDEX: 30.07 KG/M2 | DIASTOLIC BLOOD PRESSURE: 75 MMHG | WEIGHT: 192 LBS | TEMPERATURE: 99 F | HEART RATE: 89 BPM

## 2023-06-16 DIAGNOSIS — E10.9 TYPE 1 DIABETES MELLITUS WITHOUT COMPLICATION: Primary | ICD-10-CM

## 2023-06-16 DIAGNOSIS — E10.649 HYPOGLYCEMIA UNAWARENESS ASSOCIATED WITH TYPE 1 DIABETES MELLITUS: ICD-10-CM

## 2023-06-16 PROCEDURE — 99214 PR OFFICE/OUTPT VISIT, EST, LEVL IV, 30-39 MIN: ICD-10-PCS | Mod: S$GLB,,, | Performed by: NURSE PRACTITIONER

## 2023-06-16 PROCEDURE — 1159F PR MEDICATION LIST DOCUMENTED IN MEDICAL RECORD: ICD-10-PCS | Mod: CPTII,S$GLB,, | Performed by: NURSE PRACTITIONER

## 2023-06-16 PROCEDURE — 3008F PR BODY MASS INDEX (BMI) DOCUMENTED: ICD-10-PCS | Mod: CPTII,S$GLB,, | Performed by: NURSE PRACTITIONER

## 2023-06-16 PROCEDURE — 1160F PR REVIEW ALL MEDS BY PRESCRIBER/CLIN PHARMACIST DOCUMENTED: ICD-10-PCS | Mod: CPTII,S$GLB,, | Performed by: NURSE PRACTITIONER

## 2023-06-16 PROCEDURE — 1160F RVW MEDS BY RX/DR IN RCRD: CPT | Mod: CPTII,S$GLB,, | Performed by: NURSE PRACTITIONER

## 2023-06-16 PROCEDURE — 3078F DIAST BP <80 MM HG: CPT | Mod: CPTII,S$GLB,, | Performed by: NURSE PRACTITIONER

## 2023-06-16 PROCEDURE — 3008F BODY MASS INDEX DOCD: CPT | Mod: CPTII,S$GLB,, | Performed by: NURSE PRACTITIONER

## 2023-06-16 PROCEDURE — 3078F PR MOST RECENT DIASTOLIC BLOOD PRESSURE < 80 MM HG: ICD-10-PCS | Mod: CPTII,S$GLB,, | Performed by: NURSE PRACTITIONER

## 2023-06-16 PROCEDURE — 99999 PR PBB SHADOW E&M-EST. PATIENT-LVL IV: CPT | Mod: PBBFAC,,, | Performed by: NURSE PRACTITIONER

## 2023-06-16 PROCEDURE — 1159F MED LIST DOCD IN RCRD: CPT | Mod: CPTII,S$GLB,, | Performed by: NURSE PRACTITIONER

## 2023-06-16 PROCEDURE — 3074F SYST BP LT 130 MM HG: CPT | Mod: CPTII,S$GLB,, | Performed by: NURSE PRACTITIONER

## 2023-06-16 PROCEDURE — 99214 OFFICE O/P EST MOD 30 MIN: CPT | Mod: S$GLB,,, | Performed by: NURSE PRACTITIONER

## 2023-06-16 PROCEDURE — 3074F PR MOST RECENT SYSTOLIC BLOOD PRESSURE < 130 MM HG: ICD-10-PCS | Mod: CPTII,S$GLB,, | Performed by: NURSE PRACTITIONER

## 2023-06-16 PROCEDURE — 99999 PR PBB SHADOW E&M-EST. PATIENT-LVL IV: ICD-10-PCS | Mod: PBBFAC,,, | Performed by: NURSE PRACTITIONER

## 2023-06-16 RX ORDER — BLOOD-GLUCOSE TRANSMITTER
EACH MISCELLANEOUS
Qty: 1 EACH | Refills: 3 | Status: SHIPPED | OUTPATIENT
Start: 2023-06-16

## 2023-06-16 RX ORDER — BLOOD-GLUCOSE SENSOR
EACH MISCELLANEOUS
Qty: 3 EACH | Refills: 11 | Status: SHIPPED | OUTPATIENT
Start: 2023-06-16

## 2023-06-16 NOTE — PROGRESS NOTES
CC: This 42 y.o. Black or  female  is here for evaluation of  T1DM along with comorbidities indicated in the Visit Diagnosis section of this encounter.    HPI: Leona Tyler was diagnosed with T1DM at age 9. She did use Medtronic insulin pump shortly after but stopped d/t poor glucose control and frequent severe hypoglycemia.   She was under Endocrine care in [place]. Moved to [place] in late 2022.   DM COMPLICATIONS: none        Prior visit 4/2023  No recent labs available d/t cost. Requests labs at [facility]. She does not know what her BGs have been. Finally got her supplies last week but still has not been testing BGs.   She is trying to control her food portions better, choosing more vegetables/lower carb options.   Plan Labs today ordered for [facility].   Orders for Dexcom G6 sent to [facility]. Contact office if unable to obtain.   Needs to be on CGM with low alert asap due to h/o hypoglycemia unawareness.   Until CGM can be started do make sure to test glucose at least 4x/day.   See Diabetes Education for insulin pump evaluation.   Unable to evaluate glucose control and adjust insulin therapy since no glucose data or a1c is available.   Keep appointment with Danita Lipscomb, diabetes NP, in [place] in May and continue diabetes management there.  -- more convenient location to patient.     Interval hx  Pt was scheduled to see Danita Lipscomb NP, in [place] in May, closer to where pt lives. But appt was cancelled for unclear reason. She would like to continue DM care here.     She had a severe hypoglycemic episode earlier this week.   Glucose dropped to 46 abruptly in the afternoon and she became confused. For lunch she had chicken tenders and salad, prelunch BG was 200s. -- she took 12 units for this meal when generally she would've taken 4 units (2 units for 8 grams CHO and 2 units for high BG) but she took extra Fiasp that day because she was out of Basaglar x 2 days.     She was unable to get  Dexcom from [employer].   Has not sen diabetes educator yet for insulin pump evaluation.       LAST DIABETES EDUCATION: yes     HOSPITALIZED FOR DIABETES  -  Yes - for DKA several times; last episode was ~ 2008 while on insulin pump.    SIGNIFICANT DIABETES MED HISTORY:    PRESCRIBED DIABETES MEDICATIONS:   Basaglar 40 units hs    Fiasp ac based off ICR of 4  Afrezza 4-12 units if BG after meal is high > 220      Misses medication doses - denies       SELF MONITORING BLOOD GLUCOSE:  previously used Libre2 CGM with phone benjie  Currently uses the One Touch Verio Reflect meter.     HYPOGLYCEMIC EPISODES:     + hypoglycemia unawareness; symptoms sometimes do not start until BG is in 20s.   Corrects with high chew candy, keeps glucose handy.      Glucagon kit at home - yes, Baqsimi     CURRENT DIET: eats 3 meals/day. Drinks water.   does not feel comfortable with carb counting.   Biggest meal is lunch.   No snacks after dinner, occasional snacks during the day but not routinely.       CURRENT EXERCISE: tries to walk 10k steps per day, workout videos 3x/week      SOCIAL: has 3 kids -  4 yo, and 3 yo twins.       /75   Pulse 89   Temp 98.8 °F (37.1 °C)   Wt 87.1 kg (192 lb)   LMP 05/26/2023 (Exact Date)   BMI 30.07 kg/m²     ROS:   CONSTITUTIONAL: Appetite good, +  fatigue - improved   Other: denies polydipsia, no dysuria         PHYSICAL EXAM:  GENERAL: Well developed, well nourished. No acute distress.   PSYCH: AAOx3, appropriate mood and affect, conversant, well-groomed. Judgement and insight good.   NEURO: Cranial nerves grossly intact. Speech clear, no tremor.   CHEST: Respirations even and unlabored.     No results found for: HGBA1C    Lab Results   Component Value Date    CPEPTIDE <0.08 (L) 06/06/2023    ISLETCELLANT 1:512 (H) 06/06/2023      Lab Results   Component Value Date    TSH 1.880 06/06/2023         No results found for: CHOL  No results found for: HDL  No results found for: LDLCALC  No results  found for: TRIG  No results found for: CHOLHDL      Chemistry        Component Value Date/Time    CREATININE 0.9 06/06/2023 0932    No results found for: CALCIUM, ALKPHOS, AST, ALT, BILITOT, ESTGFRAFRICA, EGFRNONAA           No results found for: LABMICR, CREATRANDUR, MICALBCREAT          ASSESSMENT and PLAN:    A1C GOAL: < 7 % if no hypoglycemia         1. Type 1 diabetes mellitus without complication  Encouraged pt to avoid running out of insulin. Clarified that extra Fiasp should NOT be taken if she runs out of basal insulin or else BG can drop too low.  Needs to be on CGM with low alert asap due to h/o hypoglycemia unawareness.   Rx for Dexcom G6 sent to [facility].   Appt made for Diabetes Education for insulin pump eval.   A1c today and again prior to next visit in August, in about 6 weeks.       2. Hypoglycemia unawareness associated with type 1 diabetes mellitus                No orders of the defined types were placed in this encounter.       Follow up in about 6 weeks (around 7/28/2023).

## 2023-06-20 ENCOUNTER — PATIENT MESSAGE (OUTPATIENT)
Dept: ENDOCRINOLOGY | Facility: CLINIC | Age: 43
End: 2023-06-20
Payer: COMMERCIAL

## 2023-06-22 ENCOUNTER — OFFICE VISIT (OUTPATIENT)
Dept: OBSTETRICS AND GYNECOLOGY | Facility: CLINIC | Age: 43
End: 2023-06-22
Payer: COMMERCIAL

## 2023-06-22 VITALS
WEIGHT: 214.5 LBS | HEART RATE: 98 BPM | SYSTOLIC BLOOD PRESSURE: 126 MMHG | DIASTOLIC BLOOD PRESSURE: 66 MMHG | HEIGHT: 67 IN | BODY MASS INDEX: 33.67 KG/M2

## 2023-06-22 DIAGNOSIS — Z12.31 ENCOUNTER FOR SCREENING MAMMOGRAM FOR MALIGNANT NEOPLASM OF BREAST: ICD-10-CM

## 2023-06-22 DIAGNOSIS — Z30.09 ENCOUNTER FOR COUNSELING REGARDING CONTRACEPTION: Primary | ICD-10-CM

## 2023-06-22 DIAGNOSIS — N93.9 ABNORMAL UTERINE BLEEDING (AUB): ICD-10-CM

## 2023-06-22 LAB
B-HCG UR QL: NEGATIVE
CTP QC/QA: YES

## 2023-06-22 PROCEDURE — 81025 POCT URINE PREGNANCY: ICD-10-PCS | Mod: S$GLB,,, | Performed by: NURSE PRACTITIONER

## 2023-06-22 PROCEDURE — 1159F PR MEDICATION LIST DOCUMENTED IN MEDICAL RECORD: ICD-10-PCS | Mod: CPTII,S$GLB,, | Performed by: NURSE PRACTITIONER

## 2023-06-22 PROCEDURE — 87624 HPV HI-RISK TYP POOLED RSLT: CPT | Performed by: NURSE PRACTITIONER

## 2023-06-22 PROCEDURE — 3078F DIAST BP <80 MM HG: CPT | Mod: CPTII,S$GLB,, | Performed by: NURSE PRACTITIONER

## 2023-06-22 PROCEDURE — 99999 PR PBB SHADOW E&M-EST. PATIENT-LVL IV: ICD-10-PCS | Mod: PBBFAC,,, | Performed by: NURSE PRACTITIONER

## 2023-06-22 PROCEDURE — 81514 NFCT DS BV&VAGINITIS DNA ALG: CPT | Performed by: NURSE PRACTITIONER

## 2023-06-22 PROCEDURE — 88175 CYTOPATH C/V AUTO FLUID REDO: CPT | Performed by: NURSE PRACTITIONER

## 2023-06-22 PROCEDURE — 1159F MED LIST DOCD IN RCRD: CPT | Mod: CPTII,S$GLB,, | Performed by: NURSE PRACTITIONER

## 2023-06-22 PROCEDURE — 3008F PR BODY MASS INDEX (BMI) DOCUMENTED: ICD-10-PCS | Mod: CPTII,S$GLB,, | Performed by: NURSE PRACTITIONER

## 2023-06-22 PROCEDURE — 99386 PR PREVENTIVE VISIT,NEW,40-64: ICD-10-PCS | Mod: S$GLB,,, | Performed by: NURSE PRACTITIONER

## 2023-06-22 PROCEDURE — 3074F PR MOST RECENT SYSTOLIC BLOOD PRESSURE < 130 MM HG: ICD-10-PCS | Mod: CPTII,S$GLB,, | Performed by: NURSE PRACTITIONER

## 2023-06-22 PROCEDURE — 99386 PREV VISIT NEW AGE 40-64: CPT | Mod: S$GLB,,, | Performed by: NURSE PRACTITIONER

## 2023-06-22 PROCEDURE — 3074F SYST BP LT 130 MM HG: CPT | Mod: CPTII,S$GLB,, | Performed by: NURSE PRACTITIONER

## 2023-06-22 PROCEDURE — 3078F PR MOST RECENT DIASTOLIC BLOOD PRESSURE < 80 MM HG: ICD-10-PCS | Mod: CPTII,S$GLB,, | Performed by: NURSE PRACTITIONER

## 2023-06-22 PROCEDURE — 99999 PR PBB SHADOW E&M-EST. PATIENT-LVL IV: CPT | Mod: PBBFAC,,, | Performed by: NURSE PRACTITIONER

## 2023-06-22 PROCEDURE — 3008F BODY MASS INDEX DOCD: CPT | Mod: CPTII,S$GLB,, | Performed by: NURSE PRACTITIONER

## 2023-06-22 PROCEDURE — 87591 N.GONORRHOEAE DNA AMP PROB: CPT | Performed by: NURSE PRACTITIONER

## 2023-06-22 PROCEDURE — 81025 URINE PREGNANCY TEST: CPT | Mod: S$GLB,,, | Performed by: NURSE PRACTITIONER

## 2023-06-22 RX ORDER — BROMPHENIRAMINE MALEATE, PSEUDOEPHEDRINE HYDROCHLORIDE, AND DEXTROMETHORPHAN HYDROBROMIDE 2; 30; 10 MG/5ML; MG/5ML; MG/5ML
SYRUP ORAL
COMMUNITY

## 2023-06-22 RX ORDER — NORETHINDRONE ACETATE AND ETHINYL ESTRADIOL 1.5-30(21)
1 KIT ORAL DAILY
Qty: 90 TABLET | Refills: 4 | Status: SHIPPED | OUTPATIENT
Start: 2023-06-22 | End: 2023-06-22 | Stop reason: SDUPTHER

## 2023-06-22 RX ORDER — NORETHINDRONE ACETATE AND ETHINYL ESTRADIOL 1.5-30(21)
1 KIT ORAL DAILY
Qty: 90 TABLET | Refills: 4 | Status: SHIPPED | OUTPATIENT
Start: 2023-06-22 | End: 2024-06-21

## 2023-06-22 RX ORDER — (INSULIN DEGLUDEC AND LIRAGLUTIDE) 100; 3.6 [IU]/ML; MG/ML
INJECTION, SOLUTION SUBCUTANEOUS
COMMUNITY
End: 2023-09-26

## 2023-06-22 RX ORDER — INSULIN HUMAN 4-8-12(60)
KIT INHALATION
COMMUNITY
End: 2023-09-26

## 2023-06-22 RX ORDER — MONTELUKAST SODIUM 10 MG/1
TABLET ORAL
COMMUNITY

## 2023-06-22 RX ORDER — LEVOFLOXACIN 750 MG/1
TABLET ORAL
COMMUNITY
End: 2024-01-16 | Stop reason: ALTCHOICE

## 2023-06-22 RX ORDER — NORETHINDRONE ACETATE AND ETHINYL ESTRADIOL .03; 1.5 MG/1; MG/1
TABLET ORAL
COMMUNITY
End: 2023-06-22

## 2023-06-22 NOTE — PROGRESS NOTES
Chief Complaint: Well Woman Exam     HPI:      Leona Tyler is a 42 y.o.  who presents today for well woman exam.  LMP: Patient's last menstrual period was 2023 (exact date).   Patient is currently sexually active with a single male partner. She is currently using oral contraceptives (estrogen/progesterone) for contraception but ran out of rx 3 months ago. She declines STD screening today. Ms. Tyler confirms that she is safe at home.  Ms. Tyler denies abnormal vaginal discharge, pelvic pain, urinary problems, or changes in appetite.  Menses: monthly. Denies BTB with OCP.    Patient has been out of OCP for 3 months. Reports when not on OCP vag bleeding is very irregular. She will bleeding for 5-7 days then have a 3-5 day break before bleeding again. Reports she has dealt with this her entire life ad was diagnosed with PCOS. She is currently spotting.   Had EMBX in - negative    Denies migraines with aura, HTN, VTE, smoking.     Previous Pap:   normal per pt  (~ 1 year ago per pt)  reports hx of abnormal pap x 1 with colpo in . Denies excisional procedure. All other paps are normal.   No Previous Mammogram    Family History   Problem Relation Age of Onset    Diabetes Mother         type 2    Diabetes Father         type 1    Diabetes Maternal Grandmother         type 2    Diabetes Paternal Grandmother         type 1     OB History          2    Para   2    Term                AB        Living             SAB        IAB        Ectopic        Multiple   1    Live Births   3                 ROS:     GENERAL: Denies unintentional weight gain or weight loss. Feeling well overall.   SKIN: Denies rash or lesions.   BREASTS: Denies pain, lumps, or nipple discharge.   ABDOMEN: Denies abdominal pain, constipation, diarrhea, nausea, vomiting, change in appetite.  URINARY: Denies frequency, dysuria, hematuria.  PSYCHIATRIC: Denies depression, anxiety or mood swings.    Physical Exam:     "  PHYSICAL EXAM:  /66   Pulse 98   Ht 5' 7" (1.702 m)   Wt 97.3 kg (214 lb 8.1 oz)   LMP 05/26/2023 (Exact Date)   BMI 33.60 kg/m²   Body mass index is 33.6 kg/m².     APPEARANCE: Well nourished, well developed, in no acute distress.  PSYCH: Appropriate mood and affect.  ABDOMEN: Soft.  No tenderness or masses.    BREASTS: Symmetrical, no skin changes or visible lesions.  No palpable masses or nipple discharge bilaterally.  PELVIC: Normal external genitalia without lesions.  Normal hair distribution.  Adequate perineal body, normal urethral meatus.  Vagina moist and well rugated without lesions. + blood.  Cervix pink, without lesions, discharge or tenderness.  No significant cystocele or rectocele.  Bimanual exam shows uterus to be normal size, regular, mobile and nontender.  Adnexa without masses or tenderness.      Assessment/Plan:     Encounter for counseling regarding contraception  -     POCT Urine Pregnancy  -     Liquid-Based Pap Smear, Screening  -     HPV High Risk Genotypes, PCR    Abnormal uterine bleeding (AUB)  -     C. trachomatis/N. gonorrhoeae by AMP DNA  -     Vaginosis Screen by DNA Probe  -     US Pelvis Comp with Transvag NON-OB (xpd; Future; Expected date: 06/22/2023    Encounter for screening mammogram for malignant neoplasm of breast  -     Mammo Digital Screening Bilat w/ Vance; Future; Expected date: 06/22/2023    Other orders  -     Discontinue: norethindrone-ethinyl estradiol-iron (JUNEL FE 1.5/30, 28,) 1.5 mg-30 mcg (21)/75 mg (7) tablet; Take 1 tablet by mouth once daily.  Dispense: 90 tablet; Refill: 4  -     norethindrone-ethinyl estradiol-iron (JUNEL FE 1.5/30, 28,) 1.5 mg-30 mcg (21)/75 mg (7) tablet; Take 1 tablet by mouth once daily.  Dispense: 90 tablet; Refill: 4        Counseling:     Patient was counseled today on current ASCCP pap guidelines, the recommendation for yearly pelvic exams, healthy diet and exercise routines, breast self awareness and annual " mammograms.She is to see her PCP for other health maintenance.     Will r/o infection and get pelvic US for AUB.

## 2023-06-23 LAB
BACTERIAL VAGINOSIS DNA: NEGATIVE
C TRACH DNA SPEC QL NAA+PROBE: NOT DETECTED
CANDIDA GLABRATA DNA: NEGATIVE
CANDIDA KRUSEI DNA: NEGATIVE
CANDIDA RRNA VAG QL PROBE: NEGATIVE
N GONORRHOEA DNA SPEC QL NAA+PROBE: NOT DETECTED
T VAGINALIS RRNA GENITAL QL PROBE: NEGATIVE

## 2023-06-26 ENCOUNTER — PATIENT MESSAGE (OUTPATIENT)
Dept: ENDOCRINOLOGY | Facility: CLINIC | Age: 43
End: 2023-06-26
Payer: COMMERCIAL

## 2023-06-26 LAB
HPV HR 12 DNA SPEC QL NAA+PROBE: NEGATIVE
HPV16 AG SPEC QL: NEGATIVE
HPV18 DNA SPEC QL NAA+PROBE: NEGATIVE

## 2023-06-27 LAB
FINAL PATHOLOGIC DIAGNOSIS: NORMAL
Lab: NORMAL

## 2023-06-30 ENCOUNTER — PATIENT MESSAGE (OUTPATIENT)
Dept: ENDOCRINOLOGY | Facility: CLINIC | Age: 43
End: 2023-06-30
Payer: COMMERCIAL

## 2023-07-17 ENCOUNTER — HOSPITAL ENCOUNTER (OUTPATIENT)
Dept: RADIOLOGY | Facility: HOSPITAL | Age: 43
Discharge: HOME | End: 2023-07-17
Attending: NURSE PRACTITIONER
Payer: COMMERCIAL

## 2023-07-17 DIAGNOSIS — Z12.31 ENCOUNTER FOR SCREENING MAMMOGRAM FOR MALIGNANT NEOPLASM OF BREAST: ICD-10-CM

## 2023-07-17 PROCEDURE — 77067 SCR MAMMO BI INCL CAD: CPT | Mod: 26,,, | Performed by: RADIOLOGY

## 2023-07-17 PROCEDURE — 77067 SCR MAMMO BI INCL CAD: CPT | Mod: TC

## 2023-07-17 PROCEDURE — 77063 MAMMO DIGITAL SCREENING BILAT WITH TOMO: ICD-10-PCS | Mod: 26,,, | Performed by: RADIOLOGY

## 2023-07-17 PROCEDURE — 77067 MAMMO DIGITAL SCREENING BILAT WITH TOMO: ICD-10-PCS | Mod: 26,,, | Performed by: RADIOLOGY

## 2023-07-17 PROCEDURE — 77063 BREAST TOMOSYNTHESIS BI: CPT | Mod: 26,,, | Performed by: RADIOLOGY

## 2023-07-21 ENCOUNTER — TELEPHONE (OUTPATIENT)
Dept: OBSTETRICS AND GYNECOLOGY | Facility: CLINIC | Age: 43
End: 2023-07-21
Payer: COMMERCIAL

## 2023-07-31 ENCOUNTER — PATIENT MESSAGE (OUTPATIENT)
Dept: RESEARCH | Facility: HOSPITAL | Age: 43
End: 2023-07-31
Payer: COMMERCIAL

## 2023-07-31 DIAGNOSIS — Z91.89 AT HIGH RISK FOR BREAST CANCER: Primary | ICD-10-CM

## 2023-09-18 ENCOUNTER — PATIENT MESSAGE (OUTPATIENT)
Dept: ORTHOPEDICS | Facility: CLINIC | Age: 43
End: 2023-09-18
Payer: COMMERCIAL

## 2023-09-26 ENCOUNTER — OFFICE VISIT (OUTPATIENT)
Dept: DIABETES | Facility: CLINIC | Age: 43
End: 2023-09-26
Payer: COMMERCIAL

## 2023-09-26 VITALS
BODY MASS INDEX: 29.19 KG/M2 | HEART RATE: 93 BPM | WEIGHT: 186 LBS | SYSTOLIC BLOOD PRESSURE: 124 MMHG | HEIGHT: 67 IN | OXYGEN SATURATION: 98 % | DIASTOLIC BLOOD PRESSURE: 72 MMHG

## 2023-09-26 DIAGNOSIS — Z71.9 HEALTH EDUCATION/COUNSELING: ICD-10-CM

## 2023-09-26 DIAGNOSIS — E10.649 TYPE 1 DIABETES MELLITUS WITH HYPOGLYCEMIA AND WITHOUT COMA: ICD-10-CM

## 2023-09-26 DIAGNOSIS — E10.65 TYPE 1 DIABETES MELLITUS WITH HYPERGLYCEMIA: Primary | ICD-10-CM

## 2023-09-26 DIAGNOSIS — E10.649 HYPOGLYCEMIA UNAWARENESS ASSOCIATED WITH TYPE 1 DIABETES MELLITUS: ICD-10-CM

## 2023-09-26 DIAGNOSIS — E66.3 OVERWEIGHT (BMI 25.0-29.9): ICD-10-CM

## 2023-09-26 PROCEDURE — 3008F BODY MASS INDEX DOCD: CPT | Mod: CPTII,S$GLB,, | Performed by: NURSE PRACTITIONER

## 2023-09-26 PROCEDURE — 1160F RVW MEDS BY RX/DR IN RCRD: CPT | Mod: CPTII,S$GLB,, | Performed by: NURSE PRACTITIONER

## 2023-09-26 PROCEDURE — 3078F PR MOST RECENT DIASTOLIC BLOOD PRESSURE < 80 MM HG: ICD-10-PCS | Mod: CPTII,S$GLB,, | Performed by: NURSE PRACTITIONER

## 2023-09-26 PROCEDURE — 3074F SYST BP LT 130 MM HG: CPT | Mod: CPTII,S$GLB,, | Performed by: NURSE PRACTITIONER

## 2023-09-26 PROCEDURE — 99215 PR OFFICE/OUTPT VISIT, EST, LEVL V, 40-54 MIN: ICD-10-PCS | Mod: S$GLB,,, | Performed by: NURSE PRACTITIONER

## 2023-09-26 PROCEDURE — 1160F PR REVIEW ALL MEDS BY PRESCRIBER/CLIN PHARMACIST DOCUMENTED: ICD-10-PCS | Mod: CPTII,S$GLB,, | Performed by: NURSE PRACTITIONER

## 2023-09-26 PROCEDURE — 3078F DIAST BP <80 MM HG: CPT | Mod: CPTII,S$GLB,, | Performed by: NURSE PRACTITIONER

## 2023-09-26 PROCEDURE — 99215 OFFICE O/P EST HI 40 MIN: CPT | Mod: S$GLB,,, | Performed by: NURSE PRACTITIONER

## 2023-09-26 PROCEDURE — 99999 PR PBB SHADOW E&M-EST. PATIENT-LVL V: CPT | Mod: PBBFAC,,, | Performed by: NURSE PRACTITIONER

## 2023-09-26 PROCEDURE — 95251 CONT GLUC MNTR ANALYSIS I&R: CPT | Mod: S$GLB,,, | Performed by: NURSE PRACTITIONER

## 2023-09-26 PROCEDURE — 95251 PR GLUCOSE MONITOR, 72 HOUR, PHYS INTERP: ICD-10-PCS | Mod: S$GLB,,, | Performed by: NURSE PRACTITIONER

## 2023-09-26 PROCEDURE — 1159F MED LIST DOCD IN RCRD: CPT | Mod: CPTII,S$GLB,, | Performed by: NURSE PRACTITIONER

## 2023-09-26 PROCEDURE — 1159F PR MEDICATION LIST DOCUMENTED IN MEDICAL RECORD: ICD-10-PCS | Mod: CPTII,S$GLB,, | Performed by: NURSE PRACTITIONER

## 2023-09-26 PROCEDURE — 3074F PR MOST RECENT SYSTOLIC BLOOD PRESSURE < 130 MM HG: ICD-10-PCS | Mod: CPTII,S$GLB,, | Performed by: NURSE PRACTITIONER

## 2023-09-26 PROCEDURE — 3008F PR BODY MASS INDEX (BMI) DOCUMENTED: ICD-10-PCS | Mod: CPTII,S$GLB,, | Performed by: NURSE PRACTITIONER

## 2023-09-26 PROCEDURE — 99999 PR PBB SHADOW E&M-EST. PATIENT-LVL V: ICD-10-PCS | Mod: PBBFAC,,, | Performed by: NURSE PRACTITIONER

## 2023-09-26 RX ORDER — PEN NEEDLE, DIABETIC 30 GX3/16"
1 NEEDLE, DISPOSABLE MISCELLANEOUS
Qty: 550 EACH | Refills: 3 | Status: SHIPPED | OUTPATIENT
Start: 2023-09-26 | End: 2024-01-16 | Stop reason: SDUPTHER

## 2023-09-26 RX ORDER — INSULIN GLARGINE 300 U/ML
34 INJECTION, SOLUTION SUBCUTANEOUS NIGHTLY
Qty: 4 PEN | Refills: 11 | Status: SHIPPED | OUTPATIENT
Start: 2023-09-26 | End: 2023-10-18 | Stop reason: SDUPTHER

## 2023-09-26 RX ORDER — INSULIN ASPART INJECTION 100 [IU]/ML
INJECTION, SOLUTION SUBCUTANEOUS
Qty: 35 PEN | Refills: 3 | Status: SHIPPED | OUTPATIENT
Start: 2023-09-26 | End: 2023-10-18 | Stop reason: SDUPTHER

## 2023-09-26 NOTE — ASSESSMENT & PLAN NOTE
Uncontrolled   Goal is to prevent hypoglycemia   May be interested in insulin pump-- will have her meet with diabetes education         -- Medication Changes:   Stop basaglar   Change to Toujeo   Start with Toujeo 34 units daily     GUIDE FOR STARTING LONG-ACTING INSULIN    · Take your blood sugar before breakfast for THREE consecutive days before increasing the dose- self titration:   · If your fasting blood sugar in the morning is between 131-180 for THREE consecutive days, increase the dose by 1 unit.  · If your fasting blood sugar in the morning is > 180 for three consecutive days, increase the dose by 2 units.   · If your fasting blood sugar in the morning is between 80 and 130, continue your current dose.  · If you have ANY blood sugar less than 80, decrease the dose by 2 units.  · If you have ANY blood sugar less than 70, decrease the dose by 4 units and call your physician for assistance with further dose adjustments.      Continue your Fiasp doses for now     Continue dexcom G6     Meet with education for pump evaluation.          -- Reviewed goals of therapy are to get the best control we can without hypoglycemia.  -- Reviewed patient's current insulin regimen. Clarified proper insulin dose and timing in relation to meals, etc. Insulin injection sites and proper rotation instructed.   -- Advised frequent self blood glucose monitoring.  Patient encouraged to document glucose results and bring them to every clinic visit.-- continue dexcom G6 personal CGM- supplies from [employer]   -- Hypoglycemia precautions discussed. Instructed on precautions before driving.    -- Call for Bg repeatedly < 70 or > 200.   -- Close adherence to lifestyle changes recommended.   -- Periodic follow ups for eye evaluations, foot care and dental care suggested.  -- Refer to diabetes education-- insulin pump evaluation. - I gave her brochures today in clinic         Patient has diabetes mellitus and manages diabetes with intensive  insulin regimen and uses prandial and basal insulin daily  Patient requires a therapeutic CGM and is willing to use therapeutic CGM for the necessary frequent adjustments of insulin therapy.  I have completed an in-person visit during the previous 6 months and will continue to have in-person visits every 6 months to assess adherence to their CGM regimen and diabetes treatment plan.  Due to COVID pandemic and need for remote monitoring this tool is medically necessary

## 2023-09-26 NOTE — PROGRESS NOTES
"  CC:   Chief Complaint   Patient presents with    Diabetes Mellitus       HPI: Leona Tyler is a 43 y.o. female presents for an initial visit today for the management of T1DM   This is her first visit with me today.   She was seen previously by GLENN Peguero NP- last seen in June 2023      She was diagnosed with Type 1 diabetes at 9 years old. She did use a Medtronic pump shortly after but stopped d/t poor glucose control and frequent severe hypoglycemia   Moved to [place] in late 2022-- was in [place]       Family hx of diabetes: mother (T2DM)  and father (T1)   Hospitalized for diabetes: Yes - for DKA several times; last episode was ~ 2008 while on insulin pump.  Insulin therapy: att he time of diagnosis     No personal or FH of thyroid cancer or personal of pancreatic cancer or pancreatitis.    Was started on Afreeza in [place]= she feels like since she started using the Afreeza- she has issues with her throat- constant coughing - tickle in the throat       -- federal       Almost 6 year old son   4 year old twins - boys                 DIABETES COMPLICATIONS: none      Diabetes Management Status    ASA:  No    Statin: Not taking  ACE/ARB: Not taking    Screening or Prevention Patient's value Goal Complete/Controlled?   HgA1C Testing and Control   No results found for: "HGBA1C"   Annually/Less than 8% No   Lipid profile Most Recent Lipid Panel Health Maintenance Topic Completion: Not Found Annually No   LDL control No results found for: "LDLCALC" Annually/Less than 100 mg/dl  No   Nephropathy screening No results found for: "LABMICR"  No results found for: "PROTEINUA" Annually No   Blood pressure BP Readings from Last 1 Encounters:   09/26/23 124/72    Less than 140/90 Yes   Dilated retinal exam Most Recent Eye Exam Date: Not Found Annually Yes   Foot exam   : 09/26/2023 Annually No       CURRENT A1C:    No results found for: "HGBA1C"    GOAL A1C: 6.5% without hypoglycemia       DM MEDICATIONS USED " "IN THE PAST: Basaglar   Fiasp   Afreeza   Xultophy   Dexcom G6   Karina 2   Levemir   Lantus   Novolog   Humalog   Regular   NPH       CURRENT DIABETES MEDICATIONS:   Basaglar 40 units hs    Fiasp ac based off ICR of 1:7 -- ISF 20 and target 120  Not taking the Afreeza     Insulin:  pens.    Missed doses: occ missing doses   Sometimes taking the fiasp " way after" eating       BLOOD GLUCOSE MONITORING:    Sensor type: Dexcom G6   Average BG readin  Time in range: 48%  33% high   14% very high   4% low   <1% very low   Estimated A1c is 7.6%    Site change:q10 days    2 weeks prior - BG was 184   44% in range   7.7% estimated A1c       Supplies: DMS       Sensor was downloaded in clinic today and reviewed with patient.   Please see attached document for download.         HYPOGLYCEMIA:  Yes 4% low   <1% very low   2 weeks prior -- 6% low -- 1% very low   Glucagon kit:Baqsimi   Medic alert bracelet: denies         MEALS: eating 3 meals per day   Feels confident with CHO counting   Drinks: water and coffee and green tea        CURRENT EXERCISE:  Yes-- started recently       Review of Systems  Review of Systems   Constitutional:  Negative for appetite change and fatigue.   HENT:  Positive for postnasal drip. Negative for trouble swallowing and voice change.         Itching throat    Eyes:  Negative for visual disturbance.   Respiratory:  Negative for shortness of breath.    Cardiovascular:  Negative for chest pain.   Gastrointestinal:  Negative for nausea.   Endocrine: Negative for polydipsia, polyphagia and polyuria.   Genitourinary:         No Nocturia    Skin:  Negative for wound.   Neurological:  Negative for numbness.       Physical Exam   Physical Exam  Vitals and nursing note reviewed.   Constitutional:       General: She is not in acute distress.     Appearance: She is well-developed. She is not ill-appearing.      Comments: Overweight female    HENT:      Head: Normocephalic and atraumatic.      Right Ear: " External ear normal.      Left Ear: External ear normal.      Nose: Nose normal.   Neck:      Thyroid: No thyromegaly.      Trachea: No tracheal deviation.   Cardiovascular:      Rate and Rhythm: Normal rate and regular rhythm.      Heart sounds: No murmur heard.  Pulmonary:      Effort: Pulmonary effort is normal. No respiratory distress.      Breath sounds: Normal breath sounds.   Abdominal:      Palpations: Abdomen is soft.      Tenderness: There is no abdominal tenderness.      Hernia: No hernia is present.   Musculoskeletal:      Cervical back: Normal range of motion and neck supple.   Skin:     General: Skin is warm and dry.      Capillary Refill: Capillary refill takes less than 2 seconds.      Findings: No rash.      Comments: Injection sites and dexcom  sites are normal appearing. No lipo hypertropthy or atrophy     Neurological:      Mental Status: She is alert and oriented to person, place, and time.      Cranial Nerves: No cranial nerve deficit.   Psychiatric:         Behavior: Behavior normal.         Judgment: Judgment normal.         FOOT EXAMINATION: Appropriate footwear         Lab Results   Component Value Date    TSH 1.880 06/06/2023           Type 1 diabetes mellitus with hyperglycemia  Uncontrolled   Goal is to prevent hypoglycemia   May be interested in insulin pump-- will have her meet with diabetes education         -- Medication Changes:   Stop basaglar   Change to Toujeo   Start with Toujeo 34 units daily     GUIDE FOR STARTING LONG-ACTING INSULIN    Take your blood sugar before breakfast for THREE consecutive days before increasing the dose- self titration:   If your fasting blood sugar in the morning is between 131-180 for THREE consecutive days, increase the dose by 1 unit.  If your fasting blood sugar in the morning is > 180 for three consecutive days, increase the dose by 2 units.   If your fasting blood sugar in the morning is between 80 and 130, continue your current dose.  If you  have ANY blood sugar less than 80, decrease the dose by 2 units.  If you have ANY blood sugar less than 70, decrease the dose by 4 units and call your physician for assistance with further dose adjustments.      Continue your Fiasp doses for now     Continue dexcom G6     Meet with education for pump evaluation.          -- Reviewed goals of therapy are to get the best control we can without hypoglycemia.  -- Reviewed patient's current insulin regimen. Clarified proper insulin dose and timing in relation to meals, etc. Insulin injection sites and proper rotation instructed.   -- Advised frequent self blood glucose monitoring.  Patient encouraged to document glucose results and bring them to every clinic visit.-- continue dexcom G6 personal CGM- supplies from [employer]   -- Hypoglycemia precautions discussed. Instructed on precautions before driving.    -- Call for Bg repeatedly < 70 or > 200.   -- Close adherence to lifestyle changes recommended.   -- Periodic follow ups for eye evaluations, foot care and dental care suggested.  -- Refer to diabetes education-- insulin pump evaluation. - I gave her brochures today in clinic         Patient has diabetes mellitus and manages diabetes with intensive insulin regimen and uses prandial and basal insulin daily  Patient requires a therapeutic CGM and is willing to use therapeutic CGM for the necessary frequent adjustments of insulin therapy.  I have completed an in-person visit during the previous 6 months and will continue to have in-person visits every 6 months to assess adherence to their CGM regimen and diabetes treatment plan.  Due to COVID pandemic and need for remote monitoring this tool is medically necessary          Type 1 diabetes mellitus with hypoglycemia  Reviewed hypoglycemia management: Treat with 1/2 glass of juice, 1/2 can regular coke, or 4 glucose tablets.   Monitor and repeat treatment every 15 minutes until BG is >70   Then have a snack, which includes a  complex carbohydrate and protein.    Has Baqsimi   Recommend medic alert bracelet         Overweight-- Body mass index is 29.13 kg/m².  Increases insulin resistance.   Discussed DM diet and exercise.         I spent a total of 60 minutes on the day of the visit.This includes face to face time and non-face to face time preparing to see the patient (eg, review of tests), obtaining and/or reviewing separately obtained history, documenting clinical information in the electronic or other health record, independently interpreting results and communicating results to the patient/family/caregiver, or care coordinator.        Follow up in about 3 months (around 12/26/2023).  Schedule with isaac with diabetes education-- insulin pump evaluation   Fasting labs now-- when she can   Follow up with me in 3 months     Orders Placed This Encounter   Procedures    Hemoglobin A1C     Standing Status:   Future     Standing Expiration Date:   3/26/2025    Comprehensive Metabolic Panel     Standing Status:   Future     Standing Expiration Date:   3/26/2025    Lipid Panel     Standing Status:   Future     Standing Expiration Date:   3/26/2025    Microalbumin/Creatinine Ratio, Urine     Standing Status:   Future     Standing Expiration Date:   3/26/2025     Order Specific Question:   Specimen Source     Answer:   Urine    TSH     Standing Status:   Future     Standing Expiration Date:   3/26/2025    CBC Auto Differential     Standing Status:   Future     Standing Expiration Date:   3/26/2025    Ambulatory referral/consult to Diabetes Education     Standing Status:   Future     Standing Expiration Date:   3/26/2025     Referral Priority:   Routine     Referral Type:   Consultation     Referral Reason:   Specialty Services Required     Referred to Provider:   Isaac Javier, RD, CDE     Requested Specialty:   Diabetes     Number of Visits Requested:   1       Recommendations were discussed with the patient in detail  The patient verbalized  understanding and agrees with the plan outlined as above.     This note was partly generated with [employer] voice recognition software. I apologize for any possible typographical errors.

## 2023-09-26 NOTE — PATIENT INSTRUCTIONS
Stop basaglar   Change to Toujeo   Start with Toujeo 34 units daily     GUIDE FOR STARTING LONG-ACTING INSULIN    Take your blood sugar before breakfast for THREE consecutive days before increasing the dose- self titration:   If your fasting blood sugar in the morning is between 131-180 for THREE consecutive days, increase the dose by 1 unit.  If your fasting blood sugar in the morning is > 180 for three consecutive days, increase the dose by 2 units.   If your fasting blood sugar in the morning is between 80 and 130, continue your current dose.  If you have ANY blood sugar less than 80, decrease the dose by 2 units.  If you have ANY blood sugar less than 70, decrease the dose by 4 units and call your physician for assistance with further dose adjustments.      Continue your Fiasp doses for now     Continue dexcom G6     Meet with education for pump evaluation.

## 2023-09-26 NOTE — ASSESSMENT & PLAN NOTE
Reviewed hypoglycemia management: Treat with 1/2 glass of juice, 1/2 can regular coke, or 4 glucose tablets.   Monitor and repeat treatment every 15 minutes until BG is >70   Then have a snack, which includes a complex carbohydrate and protein.    Has Baqsimi   Recommend medic alert bracelet

## 2023-10-09 ENCOUNTER — PATIENT MESSAGE (OUTPATIENT)
Dept: DIABETES | Facility: CLINIC | Age: 43
End: 2023-10-09
Payer: COMMERCIAL

## 2023-10-09 DIAGNOSIS — E10.65 TYPE 1 DIABETES MELLITUS WITH HYPERGLYCEMIA: ICD-10-CM

## 2023-10-09 DIAGNOSIS — E10.649 HYPOGLYCEMIA UNAWARENESS ASSOCIATED WITH TYPE 1 DIABETES MELLITUS: ICD-10-CM

## 2023-10-18 RX ORDER — INSULIN GLARGINE 300 U/ML
34 INJECTION, SOLUTION SUBCUTANEOUS NIGHTLY
Qty: 4 PEN | Refills: 11 | Status: SHIPPED | OUTPATIENT
Start: 2023-10-18 | End: 2024-01-16 | Stop reason: SDUPTHER

## 2023-10-18 RX ORDER — INSULIN ASPART INJECTION 100 [IU]/ML
INJECTION, SOLUTION SUBCUTANEOUS
Qty: 35 PEN | Refills: 3 | Status: SHIPPED | OUTPATIENT
Start: 2023-10-18 | End: 2024-01-16 | Stop reason: SDUPTHER

## 2023-10-31 ENCOUNTER — OFFICE VISIT (OUTPATIENT)
Dept: ORTHOPEDICS | Facility: CLINIC | Age: 43
End: 2023-10-31
Payer: COMMERCIAL

## 2023-10-31 VITALS
BODY MASS INDEX: 30.57 KG/M2 | HEIGHT: 67 IN | DIASTOLIC BLOOD PRESSURE: 69 MMHG | WEIGHT: 194.75 LBS | SYSTOLIC BLOOD PRESSURE: 113 MMHG | HEART RATE: 81 BPM

## 2023-10-31 DIAGNOSIS — M65.342 TRIGGER FINGER, LEFT RING FINGER: Primary | ICD-10-CM

## 2023-10-31 PROCEDURE — 99999 PR PBB SHADOW E&M-EST. PATIENT-LVL III: CPT | Mod: PBBFAC,,,

## 2023-10-31 PROCEDURE — 3074F PR MOST RECENT SYSTOLIC BLOOD PRESSURE < 130 MM HG: ICD-10-PCS | Mod: CPTII,S$GLB,,

## 2023-10-31 PROCEDURE — 1159F PR MEDICATION LIST DOCUMENTED IN MEDICAL RECORD: ICD-10-PCS | Mod: CPTII,S$GLB,,

## 2023-10-31 PROCEDURE — 3078F PR MOST RECENT DIASTOLIC BLOOD PRESSURE < 80 MM HG: ICD-10-PCS | Mod: CPTII,S$GLB,,

## 2023-10-31 PROCEDURE — 3008F BODY MASS INDEX DOCD: CPT | Mod: CPTII,S$GLB,,

## 2023-10-31 PROCEDURE — 20550: ICD-10-PCS | Mod: LT,S$GLB,,

## 2023-10-31 PROCEDURE — 99213 PR OFFICE/OUTPT VISIT, EST, LEVL III, 20-29 MIN: ICD-10-PCS | Mod: 25,S$GLB,,

## 2023-10-31 PROCEDURE — 3008F PR BODY MASS INDEX (BMI) DOCUMENTED: ICD-10-PCS | Mod: CPTII,S$GLB,,

## 2023-10-31 PROCEDURE — 3074F SYST BP LT 130 MM HG: CPT | Mod: CPTII,S$GLB,,

## 2023-10-31 PROCEDURE — 99213 OFFICE O/P EST LOW 20 MIN: CPT | Mod: 25,S$GLB,,

## 2023-10-31 PROCEDURE — 99999 PR PBB SHADOW E&M-EST. PATIENT-LVL III: ICD-10-PCS | Mod: PBBFAC,,,

## 2023-10-31 PROCEDURE — 3078F DIAST BP <80 MM HG: CPT | Mod: CPTII,S$GLB,,

## 2023-10-31 PROCEDURE — 1159F MED LIST DOCD IN RCRD: CPT | Mod: CPTII,S$GLB,,

## 2023-10-31 PROCEDURE — 20550 NJX 1 TENDON SHEATH/LIGAMENT: CPT | Mod: LT,S$GLB,,

## 2023-10-31 RX ORDER — DEXAMETHASONE SODIUM PHOSPHATE 4 MG/ML
4 INJECTION, SOLUTION INTRA-ARTICULAR; INTRALESIONAL; INTRAMUSCULAR; INTRAVENOUS; SOFT TISSUE
Status: COMPLETED | OUTPATIENT
Start: 2023-10-31 | End: 2023-10-31

## 2023-10-31 RX ORDER — CETIRIZINE HYDROCHLORIDE 10 MG/1
10 TABLET ORAL
COMMUNITY
Start: 2023-08-24

## 2023-10-31 RX ADMIN — DEXAMETHASONE SODIUM PHOSPHATE 4 MG: 4 INJECTION, SOLUTION INTRA-ARTICULAR; INTRALESIONAL; INTRAMUSCULAR; INTRAVENOUS; SOFT TISSUE at 09:10

## 2023-10-31 RX ADMIN — DEXAMETHASONE SODIUM PHOSPHATE 4 MG: 4 INJECTION, SOLUTION INTRA-ARTICULAR; INTRALESIONAL; INTRAMUSCULAR; INTRAVENOUS; SOFT TISSUE at 11:10

## 2023-10-31 NOTE — PROGRESS NOTES
SUBJECTIVE:      Chief Complaint: right middle trigger finger    History of Present Illness:  Patient is a 43 y.o. right hand dominant female who presents today with complaints of right middle trigger finger.  Patient reports a history this her left hand as well as carpal tunnel syndrome.   Reports this started about 6 months ago and has progressively worsened.  No known MILEY.  Reports her middle finger intermittently locks and she has pain at the base of her finger.  She is a diabetic, last A1c unknown.  She is interested in an injection today.     The patient is a/an .    Onset of symptoms/DOI was 6 months prior.    Symptoms are aggravated by movement.    Symptoms are alleviated by rest.    Symptoms consist of locking and pain.    The patient rates their pain as a 4/10.    Attempted treatment(s) and/or interventions include activity modifications, rest, rest.     The patient denies any fevers, chills, N/V, D/C and presents for evaluation.    ____________________________________________________________________    Interval history 10/31/2023: Patient returns today for follow up of trigger finger. She reports pain in her left ring finger today. Previously had right long finger injected with significant relief. She has been wearing the oval 8 splint at night to her left ring finger. Does report her whole hand gets numb if she does not wear the splint at night. She would like to repeat injection today to her left hand.            Past Medical History:   Diagnosis Date    Diabetes mellitus type I      Past Surgical History:   Procedure Laterality Date    CARPAL TUNNEL RELEASE Right      SECTION      MYOMECTOMY       Review of patient's allergies indicates:  No Known Allergies  Social History     Social History Narrative    ** Merged History Encounter **          Family History   Problem Relation Age of Onset    Diabetes Mother         type 2    Diabetes Father         type 1    Breast cancer  Maternal Aunt     Ovarian cancer Paternal Aunt     Diabetes Maternal Grandmother         type 2    Diabetes Paternal Grandmother         type 1         Current Outpatient Medications:     blood sugar diagnostic Strp, To check BG 4 times daily, to use with insurance preferred meter, Disp: 400 each, Rfl: 3    blood-glucose meter kit, To check BG 4 times daily, to use with insurance preferred meter, Disp: 1 each, Rfl: 0    blood-glucose sensor (DEXCOM G6 SENSOR) Janny, Use as directed. Change sensor every 10 days., Disp: 3 each, Rfl: 11    blood-glucose transmitter (DEXCOM G6 TRANSMITTER) Janny, Use as directed. Change transmitter every 3 months., Disp: 1 each, Rfl: 3    brompheniramine-pseudoeph-DM (BROMFED DM) 2-30-10 mg/5 mL Syrp, brompheniramine-pseudoephedrine-DM 2 mg-30 mg-10 mg/5 mL oral syrup  TAKE 10 ML BY MOUTH EVERY 6 HOURS AS NEEDED FOR COUGH AND CONGESTION. DO NOT TAKE ANY OVER THE COUNTER COUGH MEDICATION WHILE TAKING, Disp: , Rfl:     cetirizine (ZYRTEC) 10 MG tablet, Take 10 mg by mouth., Disp: , Rfl:     Cr nicotin/gluco/bn/chit/H102 -936-250 mcg-mg-mg-mg Cap, Take 4 mg by mouth., Disp: , Rfl:     glucagon (BAQSIMI) 3 mg/actuation Spry, Use as needed for severe hypoglycemia, Disp: 2 each, Rfl: 1    insulin aspart, niacinamide, (FIASP FLEXTOUCH U-100 INSULIN) 100 unit/mL (3 mL) InPn, Inject before meals based off carb ratio 7, correction factor 20 and glucose goal 120. Max daily dose 120 units, Disp: 35 pen , Rfl: 3    insulin glargine, TOUJEO, (TOUJEO SOLOSTAR U-300 INSULIN) 300 unit/mL (1.5 mL) InPn pen, Inject 34 Units into the skin every evening. Titrate up until FBG is <130. Max TDD 60 units, Disp: 4 pen , Rfl: 11    levoFLOXacin (LEVAQUIN) 750 MG tablet, levofloxacin 750 mg tablet  TAKE 1 TABLET BY MOUTH EVERY DAY FOR INFECTION, Disp: , Rfl:     montelukast (SINGULAIR) 10 mg tablet, montelukast 10 mg tablet  TAKE 1 TABLET BY MOUTH EVERY DAY AT BEDTIME, Disp: , Rfl:     ONETOUCH DELICA PLUS  "LANCET 33 gauge Misc, Apply topically., Disp: , Rfl:     pen needle, diabetic (BD ULTRA-FINE IZZY PEN NEEDLE) 32 gauge x 5/32" Ndle, 1 Device by Misc.(Non-Drug; Combo Route) route 6 (six) times daily., Disp: 550 each, Rfl: 3    lancets Misc, To check BG 4 times daily, to use with insurance preferred meter (Patient not taking: Reported on 9/26/2023), Disp: 150 each, Rfl: 11    norethindrone-ethinyl estradiol-iron (JUNEL FE 1.5/30, 28,) 1.5 mg-30 mcg (21)/75 mg (7) tablet, Take 1 tablet by mouth once daily. (Patient not taking: Reported on 9/26/2023), Disp: 90 tablet, Rfl: 4  No current facility-administered medications for this visit.      Review of Systems:  Constitutional: no fever or chills  Eyes: no visual changes  ENT: no nasal congestion or sore throat  Respiratory: no cough or shortness of breath  Cardiovascular: no chest pain  Gastrointestinal: no nausea or vomiting, tolerating diet  Musculoskeletal: pain and soreness    OBJECTIVE:      Vital Signs (Most Recent):  Vitals:    10/31/23 1000   BP: 113/69   Pulse: 81   Weight: 88.3 kg (194 lb 12.4 oz)   Height: 5' 7" (1.702 m)     Body mass index is 30.51 kg/m².      Physical Exam:  Constitutional: The patient appears well-developed and well-nourished. No distress.   Skin: No lesions appreciated  Head: Normocephalic and atraumatic.   Nose: Nose normal.   Ears: No deformities seen  Eyes: Conjunctivae and EOM are normal.   Neck: No tracheal deviation present.   Cardiovascular: Normal rate and intact distal pulses.    Pulmonary/Chest: Effort normal. No respiratory distress.   Abdominal: There is no guarding.   Neurological: The patient is alert.   Psychiatric: The patient has a normal mood and affect.     Right Hand/Wrist Examination:    Observation/Inspection:  Swelling  none    Deformity  none  Discoloration  none     Scars   none    Atrophy  none    HAND/WRIST EXAMINATION:  Finkelstein's Test   Neg  WHAT Test    Neg  Snuff box tenderness   Neg  Scott's " Test    Neg  Hook of Hamate Tenderness  Neg  CMC grind    Neg  Circumduction test   Neg  TTP at left ring finger A1 pulley    Neurovascular Exam:  Digits WWP, brisk CR < 3s throughout  NVI motor/LTS to M/R/U nerves, radial pulse 2+  Tinel's Test - Carpal Tunnel  Neg  Tinel's Test - Cubital Tunnel  Neg  Phalen's Test    Neg  Median Nerve Compression Test Neg    ROM hand full, pain with long finger extension, locking noted on exam    ROM wrist full, painless    ROM elbow full, painless    Abdomen not guarded  Respirations nonlabored  Perfusion intact    Diagnostic Results:     Imaging - I independently viewed the patient's imaging as well as the radiology report.  Xrays of the patient's bilateral hands  demonstrate no evidence of any obvious acute fractures or dislocations or significant degenerative changes.      ASSESSMENT/PLAN:      1. Trigger finger, left ring finger  dexAMETHasone injection 4 mg    Left ring trigger finger #1            We will proceed today with trial of left ring finger injection.  Trigger finger injection given.  Post-injection instructions reviewed. Patient was advised to monitor her blood sugars closely over the next several days. The steroid may cause a rise in them. If her glucose levels rise to a point she is uncomfortable or she is unable to control them she is to contact her PCP or go immediately to the emergency department.     Cont oval 8 splint. Discussed 2 injections per lifetime to each finger.    Follow up: prn  Xrays needed: none     All of the patient's questions were answered and the patient will contact us if they have any questions or concerns in the interim.

## 2023-10-31 NOTE — PROCEDURES
Left ring trigger finger #1    Date/Time: 10/31/2023 9:30 AM    Performed by: Denise Michel PA-C  Authorized by: Denise Michel PA-C    Consent Done?:  Yes (Verbal)  Indications:  Pain  Site marked: the procedure site was marked    Timeout: prior to procedure the correct patient, procedure, and site was verified    Prep: patient was prepped and draped in usual sterile fashion      Local anesthesia used?: Yes    Local anesthetic:  Bupivacaine 0.25% without epinephrine and topical anesthetic  Anesthetic total (ml):  0.5    Location:  Ring finger  Site:  L ring flexor tendon sheath  Ultrasonic guidance for needle placement?: No    Needle size:  25 G  Approach:  Volar  Medications:  4 mg dexAMETHasone (DECADRON) injection 4 mg/mL  Patient tolerance:  Patient tolerated the procedure well with no immediate complications

## 2023-11-01 ENCOUNTER — TELEPHONE (OUTPATIENT)
Dept: DIABETES | Facility: CLINIC | Age: 43
End: 2023-11-01
Payer: COMMERCIAL

## 2023-11-01 DIAGNOSIS — E10.65 TYPE 1 DIABETES MELLITUS WITH HYPERGLYCEMIA: Primary | ICD-10-CM

## 2023-11-12 ENCOUNTER — PATIENT MESSAGE (OUTPATIENT)
Dept: DIABETES | Facility: CLINIC | Age: 43
End: 2023-11-12
Payer: COMMERCIAL

## 2023-11-22 ENCOUNTER — PATIENT MESSAGE (OUTPATIENT)
Dept: ENDOCRINOLOGY | Facility: CLINIC | Age: 43
End: 2023-11-22
Payer: COMMERCIAL

## 2023-12-14 ENCOUNTER — PATIENT MESSAGE (OUTPATIENT)
Dept: DIABETES | Facility: CLINIC | Age: 43
End: 2023-12-14
Payer: COMMERCIAL

## 2023-12-14 DIAGNOSIS — E10.65 TYPE 1 DIABETES MELLITUS WITH HYPERGLYCEMIA: Primary | ICD-10-CM

## 2024-01-04 ENCOUNTER — TELEPHONE (OUTPATIENT)
Dept: DIABETES | Facility: CLINIC | Age: 44
End: 2024-01-04
Payer: COMMERCIAL

## 2024-01-16 ENCOUNTER — OFFICE VISIT (OUTPATIENT)
Dept: DIABETES | Facility: CLINIC | Age: 44
End: 2024-01-16
Payer: COMMERCIAL

## 2024-01-16 ENCOUNTER — CLINICAL SUPPORT (OUTPATIENT)
Dept: DIABETES | Facility: CLINIC | Age: 44
End: 2024-01-16
Payer: COMMERCIAL

## 2024-01-16 VITALS
HEART RATE: 87 BPM | BODY MASS INDEX: 30.45 KG/M2 | BODY MASS INDEX: 30.45 KG/M2 | WEIGHT: 194 LBS | SYSTOLIC BLOOD PRESSURE: 125 MMHG | HEIGHT: 67 IN | HEIGHT: 67 IN | DIASTOLIC BLOOD PRESSURE: 81 MMHG | WEIGHT: 194 LBS

## 2024-01-16 DIAGNOSIS — E10.65 TYPE 1 DIABETES MELLITUS WITH HYPERGLYCEMIA: Primary | ICD-10-CM

## 2024-01-16 DIAGNOSIS — E10.65 TYPE 1 DIABETES MELLITUS WITH HYPERGLYCEMIA: ICD-10-CM

## 2024-01-16 DIAGNOSIS — Z71.9 HEALTH EDUCATION/COUNSELING: ICD-10-CM

## 2024-01-16 DIAGNOSIS — E10.649 HYPOGLYCEMIA UNAWARENESS ASSOCIATED WITH TYPE 1 DIABETES MELLITUS: ICD-10-CM

## 2024-01-16 DIAGNOSIS — E10.649 TYPE 1 DIABETES MELLITUS WITH HYPOGLYCEMIA AND WITHOUT COMA: ICD-10-CM

## 2024-01-16 DIAGNOSIS — E66.09 CLASS 1 OBESITY DUE TO EXCESS CALORIES WITH SERIOUS COMORBIDITY AND BODY MASS INDEX (BMI) OF 30.0 TO 30.9 IN ADULT: ICD-10-CM

## 2024-01-16 PROBLEM — E66.811 CLASS 1 OBESITY DUE TO EXCESS CALORIES WITH SERIOUS COMORBIDITY AND BODY MASS INDEX (BMI) OF 30.0 TO 30.9 IN ADULT: Status: ACTIVE | Noted: 2024-01-16

## 2024-01-16 PROCEDURE — 3079F DIAST BP 80-89 MM HG: CPT | Mod: CPTII,S$GLB,, | Performed by: NURSE PRACTITIONER

## 2024-01-16 PROCEDURE — 3051F HG A1C>EQUAL 7.0%<8.0%: CPT | Mod: CPTII,S$GLB,, | Performed by: NURSE PRACTITIONER

## 2024-01-16 PROCEDURE — G0108 DIAB MANAGE TRN  PER INDIV: HCPCS | Mod: S$GLB,,, | Performed by: DIETITIAN, REGISTERED

## 2024-01-16 PROCEDURE — 1159F MED LIST DOCD IN RCRD: CPT | Mod: CPTII,S$GLB,, | Performed by: NURSE PRACTITIONER

## 2024-01-16 PROCEDURE — 99214 OFFICE O/P EST MOD 30 MIN: CPT | Mod: S$GLB,,, | Performed by: NURSE PRACTITIONER

## 2024-01-16 PROCEDURE — 1160F RVW MEDS BY RX/DR IN RCRD: CPT | Mod: CPTII,S$GLB,, | Performed by: NURSE PRACTITIONER

## 2024-01-16 PROCEDURE — 3008F BODY MASS INDEX DOCD: CPT | Mod: CPTII,S$GLB,, | Performed by: NURSE PRACTITIONER

## 2024-01-16 PROCEDURE — 99999 PR PBB SHADOW E&M-EST. PATIENT-LVL IV: CPT | Mod: PBBFAC,,, | Performed by: DIETITIAN, REGISTERED

## 2024-01-16 PROCEDURE — 99999 PR PBB SHADOW E&M-EST. PATIENT-LVL IV: CPT | Mod: PBBFAC,,, | Performed by: NURSE PRACTITIONER

## 2024-01-16 PROCEDURE — 3074F SYST BP LT 130 MM HG: CPT | Mod: CPTII,S$GLB,, | Performed by: NURSE PRACTITIONER

## 2024-01-16 PROCEDURE — 95251 CONT GLUC MNTR ANALYSIS I&R: CPT | Mod: S$GLB,,, | Performed by: NURSE PRACTITIONER

## 2024-01-16 RX ORDER — INSULIN GLARGINE 300 U/ML
34 INJECTION, SOLUTION SUBCUTANEOUS NIGHTLY
Qty: 12 PEN | Refills: 3 | Status: SHIPPED | OUTPATIENT
Start: 2024-01-16 | End: 2024-05-07 | Stop reason: SDUPTHER

## 2024-01-16 RX ORDER — PEN NEEDLE, DIABETIC 30 GX3/16"
1 NEEDLE, DISPOSABLE MISCELLANEOUS
Qty: 550 EACH | Refills: 3 | Status: SHIPPED | OUTPATIENT
Start: 2024-01-16

## 2024-01-16 RX ORDER — INSULIN ASPART INJECTION 100 [IU]/ML
INJECTION, SOLUTION SUBCUTANEOUS
Qty: 35 PEN | Refills: 3 | Status: SHIPPED | OUTPATIENT
Start: 2024-01-16 | End: 2024-01-24

## 2024-01-16 NOTE — ASSESSMENT & PLAN NOTE
Uncontrolled   Goal is to prevent hypoglycemia again today   Strongly encouraged an insulin pump -- she is meeting with education after our visit today for an insulin pump evaluation.-- recommend an AID system   Lots of variability to her readings. Highs and lows   Fix the lows and the highs should improve.         -- Medication Changes:     Think about a pump!!! I think it would be VERY helpful!!!     Adjust your insulin to carb ratio to 1:6   So 1 unit for every 6 grams of carbohydrates     Adjust your correction factor to 1:25   So 1 unit for every 25 points above target   Target is still 120    So for example-- BG is 200- subtract 120 from it-- 80 points above target-- divide by 25-- 3 units for correction   Do this on a PREMEAL reading     Continue the Toujeo 35 units nightly       Pump settings if she moves forward:     Basal: 1.25 units/hr   ICR: 1:8  ISF: 1:30  Target: 120  IOB: 3 hours       -- Reviewed goals of therapy are to get the best control we can without hypoglycemia.  -- Reviewed patient's current insulin regimen. Clarified proper insulin dose and timing in relation to meals, etc. Insulin injection sites and proper rotation instructed.   -- Advised frequent self blood glucose monitoring.  Patient encouraged to document glucose results and bring them to every clinic visit.-- continue dexcom G6 personal CGM- supplies from [employer]   -- Hypoglycemia precautions discussed. Instructed on precautions before driving.    -- Call for Bg repeatedly < 70 or > 200.   -- Close adherence to lifestyle changes recommended.   -- Periodic follow ups for eye evaluations, foot care and dental care suggested.  -- Refer to diabetes education-- insulin pump evaluation today       Patient has diabetes mellitus and manages diabetes with intensive insulin regimen and uses prandial and basal insulin daily  Patient requires a therapeutic CGM and is willing to use therapeutic CGM for the necessary frequent adjustments of insulin  therapy.  I have completed an in-person visit during the previous 6 months and will continue to have in-person visits every 6 months to assess adherence to their CGM regimen and diabetes treatment plan.  Due to COVID pandemic and need for remote monitoring this tool is medically necessary

## 2024-01-16 NOTE — PROGRESS NOTES
CC:   Chief Complaint   Patient presents with    Diabetes Mellitus       HPI: Leona Tyler is a 43 y.o. female presents for a follow up visit today for the management of T1DM     She was diagnosed with Type 1 diabetes at 9 years old. She did use a [employer] pump shortly after but stopped d/t poor glucose control and frequent severe hypoglycemia   Moved to [place] in late 2022-- was in [place]       Family hx of diabetes: mother (T2DM)  and father (T1)   Hospitalized for diabetes: Yes - for DKA several times; last episode was ~ 2008 while on insulin pump.  Insulin therapy: att he time of diagnosis     No personal or FH of thyroid cancer or personal of pancreatic cancer or pancreatitis.    Was started on Afreeza in [place]= she feels like since she started using the Afreeza- she has issues with her throat- constant coughing - tickle in the throat       -- federal       Almost 6 year old son   4 year old twins - boys        Our 1st and last visit was in September of 2023   At that visit we stop the Basaglar and change to Toujeo    We discussed an insulin pump and she may be interested.  Ordered for her to meet with diabetes education   Unfortunately she has not seen diabetes education since our last visit and she actually has an appointment following our visit today to discuss insulin pump evaluation options   See attached Dexcom download she has blood sugar variability   Recent A1c of 7.8%  Sometimes she is working overnight                     DIABETES COMPLICATIONS: none      Diabetes Management Status    ASA:  No    Statin: Not taking  ACE/ARB: Not taking    Screening or Prevention Patient's value Goal Complete/Controlled?   HgA1C Testing and Control   Lab Results   Component Value Date    HGBA1C 7.8 (H) 01/03/2024      Annually/Less than 8% No   Lipid profile : 10/31/2023 Annually No   LDL control Lab Results   Component Value Date    LDLCALC 103.8 10/31/2023    Annually/Less than 100 mg/dl  No  "  Nephropathy screening Lab Results   Component Value Date    LABMICR <5.0 10/31/2023     No results found for: "PROTEINUA" Annually No   Blood pressure BP Readings from Last 1 Encounters:   24 125/81    Less than 140/90 Yes   Dilated retinal exam Most Recent Eye Exam Date: Not Found Annually Yes   Foot exam   : 2023 Annually No       CURRENT A1C:    Hemoglobin A1C   Date Value Ref Range Status   2024 7.8 (H) 4.0 - 5.6 % Final     Comment:     ADA Screening Guidelines:  5.7-6.4%  Consistent with prediabetes  >or=6.5%  Consistent with diabetes    High levels of fetal hemoglobin interfere with the HbA1C  assay. Heterozygous hemoglobin variants (HbS, HgC, etc)do  not significantly interfere with this assay.   However, presence of multiple variants may affect accuracy.     10/31/2023 7.5 (H) 4.0 - 5.6 % Final     Comment:     ADA Screening Guidelines:  5.7-6.4%  Consistent with prediabetes  >or=6.5%  Consistent with diabetes    High levels of fetal hemoglobin interfere with the HbA1C  assay. Heterozygous hemoglobin variants (HbS, HgC, etc)do  not significantly interfere with this assay.   However, presence of multiple variants may affect accuracy.         GOAL A1C: 6.5% without hypoglycemia       DM MEDICATIONS USED IN THE PAST: Basaglar   Fiasp   Afreeza   Xultophy   Dexcom G6   Karina 2   Levemir   Lantus   Novolog   Humalog   Regular   NPH   Toujeo       CURRENT DIABETES MEDICATIONS:   Toujeo 35 units nightly   Fiasp ac based off ICR of 1:7 -- ISF 20 and target 120      Insulin:  pens.    Missed doses: occ missing doses   Sometimes taking the fiasp " way after" eating       BLOOD GLUCOSE MONITORING:    Sensor type: Dexcom G6   Average BG readin  Time in range: 43%  28% high   23% very high   4% low   2% very low   Estimated A1c is 7.7%    Site change:q10 days    2 weeks prior - BG was 219   32% in range   32% high   34% very high   8.6% estimated A1c       Supplies: DMS       Sensor was " downloaded in clinic today and reviewed with patient.   Please see attached document for download.         HYPOGLYCEMIA:  Yes 4% low   2% very low   2 weeks prior --0% low -- 0% very low   Glucagon kit:Baqsimi   Medic alert bracelet: denies         MEALS: eating 3 meals per day   Feels confident with CHO counting   Drinks: water and coffee and green tea        CURRENT EXERCISE:  Yes-- started recently       Review of Systems  Review of Systems   Constitutional:  Positive for fatigue and unexpected weight change (weight gain). Negative for appetite change.   HENT:  Negative for trouble swallowing and voice change.    Eyes:  Negative for visual disturbance.   Respiratory:  Negative for shortness of breath.    Cardiovascular:  Negative for chest pain.   Gastrointestinal:  Negative for nausea.   Endocrine: Negative for polydipsia, polyphagia and polyuria.   Genitourinary:         No Nocturia    Skin:  Negative for wound.   Neurological:  Negative for numbness.       Physical Exam   Physical Exam  Vitals and nursing note reviewed.   Constitutional:       General: She is not in acute distress.     Appearance: She is well-developed. She is obese. She is not ill-appearing.   HENT:      Head: Normocephalic and atraumatic.      Right Ear: External ear normal.      Left Ear: External ear normal.      Nose: Nose normal.   Neck:      Thyroid: No thyromegaly.      Trachea: No tracheal deviation.   Cardiovascular:      Rate and Rhythm: Normal rate and regular rhythm.      Heart sounds: No murmur heard.  Pulmonary:      Effort: Pulmonary effort is normal. No respiratory distress.      Breath sounds: Normal breath sounds.   Abdominal:      Palpations: Abdomen is soft.      Tenderness: There is no abdominal tenderness.      Hernia: No hernia is present.   Musculoskeletal:      Cervical back: Normal range of motion and neck supple.   Skin:     General: Skin is warm and dry.      Capillary Refill: Capillary refill takes less than 2  seconds.      Findings: No rash.      Comments: Injection sites and dexcom  sites are normal appearing. No lipo hypertropthy or atrophy     Neurological:      Mental Status: She is alert and oriented to person, place, and time.      Cranial Nerves: No cranial nerve deficit.   Psychiatric:         Behavior: Behavior normal.         Judgment: Judgment normal.         FOOT EXAMINATION: Appropriate footwear         Lab Results   Component Value Date    TSH 1.030 10/31/2023           Type 1 diabetes mellitus with hyperglycemia  Uncontrolled   Goal is to prevent hypoglycemia again today   Strongly encouraged an insulin pump -- she is meeting with education after our visit today for an insulin pump evaluation.-- recommend an AID system   Lots of variability to her readings. Highs and lows   Fix the lows and the highs should improve.         -- Medication Changes:     Think about a pump!!! I think it would be VERY helpful!!!     Adjust your insulin to carb ratio to 1:6   So 1 unit for every 6 grams of carbohydrates     Adjust your correction factor to 1:25   So 1 unit for every 25 points above target   Target is still 120    So for example-- BG is 200- subtract 120 from it-- 80 points above target-- divide by 25-- 3 units for correction   Do this on a PREMEAL reading     Continue the Toujeo 35 units nightly       Pump settings if she moves forward:     Basal: 1.25 units/hr   ICR: 1:8  ISF: 1:30  Target: 120  IOB: 3 hours       -- Reviewed goals of therapy are to get the best control we can without hypoglycemia.  -- Reviewed patient's current insulin regimen. Clarified proper insulin dose and timing in relation to meals, etc. Insulin injection sites and proper rotation instructed.   -- Advised frequent self blood glucose monitoring.  Patient encouraged to document glucose results and bring them to every clinic visit.-- continue dexcom G6 personal CGM- supplies from [facility]   -- Hypoglycemia precautions discussed. Instructed on  precautions before driving.    -- Call for Bg repeatedly < 70 or > 200.   -- Close adherence to lifestyle changes recommended.   -- Periodic follow ups for eye evaluations, foot care and dental care suggested.  -- Refer to diabetes education-- insulin pump evaluation today       Patient has diabetes mellitus and manages diabetes with intensive insulin regimen and uses prandial and basal insulin daily  Patient requires a therapeutic CGM and is willing to use therapeutic CGM for the necessary frequent adjustments of insulin therapy.  I have completed an in-person visit during the previous 6 months and will continue to have in-person visits every 6 months to assess adherence to their CGM regimen and diabetes treatment plan.  Due to COVID pandemic and need for remote monitoring this tool is medically necessary          Type 1 diabetes mellitus with hypoglycemia  Reviewed hypoglycemia management: Treat with 1/2 glass of juice, 1/2 can regular coke, or 4 glucose tablets.   Monitor and repeat treatment every 15 minutes until BG is >70   Then have a snack, which includes a complex carbohydrate and protein.    Has Baqsimi   Recommend medic alert bracelet     Class 1 obesity due to excess calories with serious comorbidity and body mass index (BMI) of 30.0 to 30.9 in adult  Body mass index is 30.38 kg/m².  Increases insulin resistance.   Discussed DM diet and exercise.           I spent a total of 30 minutes on the day of the visit.This includes face to face time and non-face to face time preparing to see the patient (eg, review of tests), obtaining and/or reviewing separately obtained history, documenting clinical information in the electronic or other health record, independently interpreting results and communicating results to the patient/family/caregiver, or care coordinator.        Follow up in about 3 months (around 4/16/2024).  Follow up with me in 3 months with labs prior         Orders Placed This Encounter    Procedures    Hemoglobin A1C     Standing Status:   Future     Standing Expiration Date:   7/16/2025    Basic Metabolic Panel     Standing Status:   Future     Standing Expiration Date:   7/16/2025       Recommendations were discussed with the patient in detail  The patient verbalized understanding and agrees with the plan outlined as above.     This note was partly generated with [employer] voice recognition software. I apologize for any possible typographical errors.

## 2024-01-16 NOTE — ASSESSMENT & PLAN NOTE
Body mass index is 30.38 kg/m².  Increases insulin resistance.   Discussed DM diet and exercise.

## 2024-01-16 NOTE — PROGRESS NOTES
"Diabetes Care Specialist Progress Note  Author: Kellee Javier RD, CDE  Date: 1/16/2024    Program Intake  Reason for Diabetes Program Visit:: Initial Diabetes Assessment  Type of Intervention:: Individual  Individual: Education  Education: Insulin Pump Evaluation  Current diabetes risk level:: low (HgbA1c 7.8)  In the last 12 months, have you:: none  Permission to speak with others about care:: no    Lab Results   Component Value Date    HGBA1C 7.8 (H) 01/03/2024       Clinical    Weight: 88 kg (194 lb)   Height: 5' 7" (170.2 cm)   Body mass index is 30.38 kg/m².    Patient Health Rating  Compared to other people your age, how would you rate your health?: Good    Problem Review  Reviewed Problem List with Patient: yes  Active comorbidities affecting diabetes self-care.: no  Reviewed health maintenance: yes    Clinical Assessment  Current Diabetes Treatment: Diet, Insulin (Toujeo 35 units and fiasp using an ICR and ISF)  Have you ever experienced hypoglycemia (low blood sugar)?: yes  In the last month, how often have you experienced low blood sugar?: more than once a week  Are you able to tell when your blood sugar is low?: No  Have you ever been hospitalized because your blood sugar was too low?: no  How do you treat hypoglycemia (low blood sugar)?: 1/2 can soda/fruit juice, 4 glucose tablets  Have you ever experienced hyperglycemia (high blood sugar)?: yes  In the last month, how often have you experienced high blood sugar?: more than once a week  Are you able to tell when your blood sugar is high?: No (comment)  Have you ever been hospitalized because your blood sugar was high?: yes (see comments) (last time was 2008)    Medication Information  How do you obtain your medications?: Patient drives  How many days a week do you miss your medications?: 2 (occassionally)  Do you use a pill box or medication chart to help you manage your medications?: No  Do you sometimes have difficulty refilling your medications?: " No  Medication adherence impacting ability to self-manage diabetes?: No    Labs  Do you have regular lab work to monitor your medications?: Yes  Type of Regular Lab Work: A1c, Cholesterol, Microalbumin, CBC, BMP  Where do you get your labs drawn?: [facility]  Lab Compliance Barriers: No      Comparison of previous CGM data 9/26/2023 to current    Average Glucose 186 increased from 179  Standard Deviation 77 increased from 65  GMI 7.7 slight increased from 7.6    Time in Range  23% of time patient was in Very High Range increased from 14%  28% of time patient was in High Range decreased from 33%  43% of time patient was in Range decreased from 48%  4% of time patient was in Low Range no change from 4%  2% of time patient was in Very Low Range increased from <1%    Nutritional Status  Diet: Regular  Change in appetite?: No  Dentation:: Intact  Recent Changes in Weight: No Recent Weight Change  Current nutritional status an area of need that is impacting patient's ability to self-manage diabetes?: No    Additional Social History    Support  Does anyone support you with your diabetes care?: yes  Who supports you?: self, spouse  Who takes you to your medical appointments?: self  Does the current support meet the patient's needs?: Yes  Is Support an area impacting ability to self-manage diabetes?: No    Access to Mass Media & Technology  Does the patient have access to any of the following devices or technologies?: Smart phone, Internet Access  Media or technology needs impacting ability to self-manage diabetes?: No    Cognitive/Behavioral Health  Alert and Oriented: Yes  Difficulty Thinking: No  Requires Prompting: No  Requires assistance for routine expression?: No  Cognitive or behavioral barriers impacting ability to self-manage diabetes?: No    Culture/Christianity  Culture or Anabaptist beliefs that may impact ability to access healthcare: No    Communication  Language preference: English  Hearing Problems: No  Vision  Problems: No  Communication needs impacting ability to self-manage diabetes?: No    Health Literacy  Preferred Learning Method: Face to Face  How often do you need to have someone help you read instructions, pamphlets, or written material from your doctor or pharmacy?: Rarely  Health literacy needs impacting ability to self-manage diabetes?: No      Diabetes Self-Management Skills Assessment    Diabetes Disease Process/Treatment Options  Patient/caregiver able to state what happens when someone has diabetes.: yes  Patient/caregiver knows what type of diabetes they have.: yes  Diabetes Type : Type I  Patient/caregiver able to identify at least three signs and symptoms of diabetes.: yes  Identified signs and symptoms:: fatigue, blurred vision, frequent urination, increased thirst  Diabetes Disease Process/Treatment Options: Skills Assessment Completed: Yes  Assessment indicates:: Adequate understanding  Area of need?: No    Nutrition/Healthy Eating  Method of carbohydrate measurement:: carb counting/reading labels  Patient can identify foods that impact blood sugar.: yes  Patient-identified foods:: fruit/fruit juice, milk, soda, starchy vegetables (corn, peas, beans), starches (bread, pasta, rice, cereal), sweets, yogurt  Nutrition/Healthy Eating Skills Assessment Completed:: Yes  Assessment indicates:: Adequate understanding  Area of need?: No    Physical Activity/Exercise  Physical Activity/Exercise Skills Assessment Completed: : No  Deffered due to:: Time  Area of need?: Deferred    Medications  Patient is able to describe current diabetes management routine.: yes  Diabetes management routine:: diet, insulin (toujeo 35 units daily and fiasp using ICR and ISF)  Patient is able to identify current diabetes medications, dosages, and appropriate timing of medications.: yes  Patient understands the purpose of the medications taken for diabetes.: yes  Patient reports problems or concerns with current medication regimen.:  yes  Medication regimen problems/concerns:: does not feel like regimen is working  Medication Skills Assessment Completed:: Yes  Assessment indicates:: Instruction Needed, Knowledge deficit  Area of need?: Yes    Home Blood Glucose Monitoring  Patient states that blood sugar is checked at home daily.: yes  Monitoring Method:: personal continuous glucose monitor  Personal CGM type:: Dexcom G6  Patient is able to use personal CGM appropriately.: yes  CGM Report reviewed?: yes  Home Blood Glucose Monitoring Skills Assessment Completed: : Yes  Assessment indicates:: Adequate understanding  Area of need?: No    Acute Complications  Patient is able to identify types of acute complications: Yes  Patient Identified:: Hypoglycemia, Hyperglycemia, Diabetic Ketoacidosis  Patient is able to state the basic meaning of hypoglycemia?: Yes  Able to state the blood sugar range for hypoglycemia?: yes  Patient stated range:: <70  Patient can identify general symptoms of hypoglycemia: yes  Patient identified:: shakiness, dizziness  Able to state proper treatment of hypoglycemia?: yes  Patient identified:: 1/2 can soda/fruit juice, 4 glucose tablets  Patient is able to state the basic meaning of hyperglycemia?: Yes  Able to state the blood sugar range for hyperglycemia?: yes  Patient stated range:: >250  Patient able to state proper treatment of hyperglycemia?: yes  Patient identified:: monitor blood sugar, take medication as recommended  Patient able to verbalize sick day plan?: yes  Patient-stated sick day plan:: check blood sugar every 2-3 hours, check urine for ketones, drink more fluids, seek medical attention for nausea, diarrhea, vomiting, fever with high blood sugar, call provider if blood sugar does not improve  Acute Complications Skills Assessment Completed: : Yes  Assessment indicates:: Adequate understanding  Area of need?: No    Chronic Complications  Chronic Complications Skills Assessment Completed: : No  Deferred due  to:: Time  Area of need?: Deferred    Psychosocial/Coping  Psychosocial/Coping Skills Assessment Completed: : No  Deffered due to:: Time  Area of need?: Deferred      Assessment Summary and Plan    Based on today's diabetes care assessment, the following areas of need were identified:          1/16/2024    12:01 AM   Social   Support No   Access to Mass Media/Tech No   Cognitive/Behavioral Health No   Culture/Uatsdin No   Communication No   Health Literacy No            1/16/2024    12:01 AM   Clinical   Medication Adherence No   Lab Compliance No   Nutritional Status No            1/16/2024    12:01 AM   Diabetes Self-Management Skills   Diabetes Disease Process/Treatment Options No   Nutrition/Healthy Eating No   Physical Activity/Exercise Deferred   Medication Yes, Insulin Pump Education  Discussed SlapVid 780G series Pump and the Guardian Sensor; Tandem T-Slim and control IQ; Tandem Mobi; Beta Bionics; and OmniPod Dash /OmniPod 5 Automated mode.    Patient educated on insulin pump therapy, the purpose of insulin pump therapy, advantages and disadvantages of insulin pump therapy and/vs multiple daily injections, what a basal rate and bolus dose are, when to change infusion site and tubing, demonstrated how to prepare the syringe/cartridge and tubing (except for OmniPod) for use in the pump  Discussed ease of usage, infusion sets, communication with the sensor, basal and bolus, carbohydrate to insulin ration, correction factors, approved areas to insert set, carbohydrate counting, error messages, how to disconnect and reconnect the cartridge and tubing   Patient instructed that based on current insulin regimen she would be changing infusion set daily.  Patient interested in the tandem T-slim with control IQ  Provider notified and paperwork completed and submitted to  tandem    Reiterated medication changes made a visit with provider prior to education visit  Adjust insulin to carb ratio to 1:6   So 1 unit for  every 6 grams of carbohydrates      Adjust correction factor to 1:25   So 1 unit for every 25 points above target   Target is still 120     So for example-- BG is 200- subtract 120 from it-- 80 points above target-- divide by 25-- 3 units for correction   Do this on a PREMEAL reading      Continue the Toujeo 35 units nightly         Pump settings moving forward:      Basal: 1.25 units/hr   ICR: 1:8  ISF: 1:30  Target: 120  IOB: 3 hours   (In control IQ target will default to 150 and IOB to 5 hours)   Home Blood Glucose Monitoring No   Acute Complications No   Chronic Complications Deferred   Psychosocial/Coping Deferred          Today's interventions were provided through individual discussion, instruction, and written materials were provided.      Patient verbalized understanding of instruction and written materials.  Pt was able to return back demonstration of instructions today. Patient understood key points, needs reinforcement and further instruction.     Diabetes Self-Management Care Plan:    Today's Diabetes Self-Management Care Plan was developed with Leona's input. Leona has agreed to work toward the following goal(s) to improve his/her overall diabetes control.      Care Plan: Diabetes Management   Updates made since 12/17/2023 12:00 AM        Problem: Medications         Goal: Patient Agrees to take Diabetes Medication(s) fiasp via tandem T-slim insulin pump as prescribed for the next 6 months.    Start Date: 1/16/2024   Expected End Date: 7/16/2024   This Visit's Progress: Deferred   Priority: High   Barriers: Lack of Supplies        Task: Reviewed with patient all current diabetes medications and provided basic review of the purpose, dosage, frequency, side effects, and storage of both oral and injectable diabetes medications.         Task: Explained Control IQ Technology         Task: patient demonstrated ability to program pump, activate and insert infusion set using aseptic technique.         Task:  Reviewed and reconciled current medication list today.         Task: Discussed any concerns regarding the current medication regimen. Completed 1/16/2024        Task: Instructed pt on use of basic pump features ie...give a bolus, pause insulin, switch from manual to automated mode.         Task: Reviewed features available in manual mode verses Control IQ mode.         Task: Reviewed when and how to use activity function sleep and exercise modes.         Task: Reviewed site selection of infusion set and rotation of sites.         Task: Instructed that insulin vial is good out of refrigeration for up to 28-30 days.         Task: reviewed appropriate injection site selection and importance of rotating sites.         Task: Insulin pump evaluation completed Completed 1/16/2024        Task: Advised to carry back-up supplies with them and discussed steps to take in case of connection loss.         Task: Instructed on application of IC, CF ratio, reviewed changes made by provider Completed 1/16/2024        Task: Instructed on Insulin pump therapy and current insuin pumps available on the market Completed 1/16/2024        Task: Discussed guidelines for preventing, detecting and treating hypoglycemia and hyperglycemia and reviewed the importance of meal and medication timing with diabetes mediations for prevention of hypoglycemia and maximum drug benefit.         Task: Details of pump therapy were covered including pump/benjie features and programming         Task: Reviewed setting & editing basal rates in manual mode, giving bolus and other features in the set-up menu.         Task: Tandem 24-hour support line provided.         Task: Patient provided with t-connect usernames and passwords, also documented in chart note and blue sticky note in Epic         Task: Set up tandem benjie on patients phone, patient will be using benjie inaddition to pump         Task: Tandem and Dexcom data was downloaded and reviewed with patient and  provider, any necessary adjustments were made         Task: Tandem and Dexcom data was downloaded and reviewed with patient and provider, any necessary adjustments were made         Task: Tandem and Dexcom data was downloaded and reviewed with patient and provider, any necessary adjustments were made         Task: Tandem and Dexcom data was downloaded and reviewed with patient and provider, any necessary adjustments were made         Task: Tandem and Dexcom data was downloaded and reviewed with patient and provider, any necessary adjustments were made           Follow Up Plan     Follow up in about 4 weeks (around 2/13/2024) for Personal CGM Upload, Insulin Pump Start.    Today's care plan and follow up schedule was discussed with patient.  Leona verbalized understanding of the care plan, goals, and agrees to follow up plan.        The patient was encouraged to communicate with his/her health care provider/physician and care team regarding his/her condition(s) and treatment.  I provided the patient with my contact information today and encouraged to contact me via phone or [facility]'s Patient Portal as needed.     Length of Visit   Total Time: 60 Minutes

## 2024-01-16 NOTE — PATIENT INSTRUCTIONS
Think about a pump!!! I think it would be VERY helpful!!!     Adjust your insulin to carb ratio to 1:6   So 1 unit for every 6 grams of carbohydrates     Adjust your correction factor to 1:25   So 1 unit for every 25 points above target   Target is still 120    So for example-- BG is 200- subtract 120 from it-- 80 points above target-- divide by 25-- 3 units for correction   Do this on a PREMEAL reading     Continue the Toujeo 35 units nightly

## 2024-01-23 ENCOUNTER — PATIENT MESSAGE (OUTPATIENT)
Dept: DIABETES | Facility: CLINIC | Age: 44
End: 2024-01-23
Payer: COMMERCIAL

## 2024-01-23 DIAGNOSIS — E10.65 TYPE 1 DIABETES MELLITUS WITH HYPERGLYCEMIA: Primary | ICD-10-CM

## 2024-01-24 ENCOUNTER — TELEPHONE (OUTPATIENT)
Dept: DIABETES | Facility: CLINIC | Age: 44
End: 2024-01-24
Payer: COMMERCIAL

## 2024-01-24 RX ORDER — INSULIN LISPRO-AABC 100 [IU]/ML
INJECTION, SOLUTION SUBCUTANEOUS
Qty: 35 PEN | Refills: 3 | Status: SHIPPED | OUTPATIENT
Start: 2024-01-24 | End: 2024-02-19

## 2024-02-19 ENCOUNTER — TELEPHONE (OUTPATIENT)
Dept: DIABETES | Facility: CLINIC | Age: 44
End: 2024-02-19

## 2024-02-19 ENCOUNTER — PATIENT MESSAGE (OUTPATIENT)
Dept: DIABETES | Facility: CLINIC | Age: 44
End: 2024-02-19

## 2024-02-19 ENCOUNTER — OFFICE VISIT (OUTPATIENT)
Dept: DIABETES | Facility: CLINIC | Age: 44
End: 2024-02-19
Payer: COMMERCIAL

## 2024-02-19 VITALS — SYSTOLIC BLOOD PRESSURE: 125 MMHG | WEIGHT: 201 LBS | BODY MASS INDEX: 31.48 KG/M2 | DIASTOLIC BLOOD PRESSURE: 81 MMHG

## 2024-02-19 DIAGNOSIS — E10.65 TYPE 1 DIABETES MELLITUS WITH HYPERGLYCEMIA: Primary | ICD-10-CM

## 2024-02-19 DIAGNOSIS — Z71.9 HEALTH EDUCATION/COUNSELING: ICD-10-CM

## 2024-02-19 DIAGNOSIS — E10.649 TYPE 1 DIABETES MELLITUS WITH HYPOGLYCEMIA AND WITHOUT COMA: ICD-10-CM

## 2024-02-19 DIAGNOSIS — E66.09 CLASS 1 OBESITY DUE TO EXCESS CALORIES WITH SERIOUS COMORBIDITY AND BODY MASS INDEX (BMI) OF 31.0 TO 31.9 IN ADULT: ICD-10-CM

## 2024-02-19 PROCEDURE — 1160F RVW MEDS BY RX/DR IN RCRD: CPT | Mod: CPTII,95,, | Performed by: NURSE PRACTITIONER

## 2024-02-19 PROCEDURE — 3079F DIAST BP 80-89 MM HG: CPT | Mod: CPTII,95,, | Performed by: NURSE PRACTITIONER

## 2024-02-19 PROCEDURE — 95251 CONT GLUC MNTR ANALYSIS I&R: CPT | Mod: NDTC,S$GLB,, | Performed by: NURSE PRACTITIONER

## 2024-02-19 PROCEDURE — 3051F HG A1C>EQUAL 7.0%<8.0%: CPT | Mod: CPTII,95,, | Performed by: NURSE PRACTITIONER

## 2024-02-19 PROCEDURE — 99214 OFFICE O/P EST MOD 30 MIN: CPT | Mod: 95,,, | Performed by: NURSE PRACTITIONER

## 2024-02-19 PROCEDURE — 3074F SYST BP LT 130 MM HG: CPT | Mod: CPTII,95,, | Performed by: NURSE PRACTITIONER

## 2024-02-19 PROCEDURE — 3008F BODY MASS INDEX DOCD: CPT | Mod: CPTII,95,, | Performed by: NURSE PRACTITIONER

## 2024-02-19 PROCEDURE — 1159F MED LIST DOCD IN RCRD: CPT | Mod: CPTII,95,, | Performed by: NURSE PRACTITIONER

## 2024-02-19 RX ORDER — INSULIN PMP CART,AUT,G6/7,CNTR
1 EACH SUBCUTANEOUS
Qty: 1 EACH | Refills: 0 | Status: SHIPPED | OUTPATIENT
Start: 2024-02-19 | End: 2024-05-07

## 2024-02-19 RX ORDER — INSULIN PMP CART,AUT,G6/7,CNTR
1 EACH SUBCUTANEOUS
Qty: 3 EACH | Refills: 11 | Status: SHIPPED | OUTPATIENT
Start: 2024-02-19 | End: 2024-05-07 | Stop reason: SDUPTHER

## 2024-02-19 RX ORDER — INSULIN ASPART INJECTION 100 [IU]/ML
INJECTION, SOLUTION SUBCUTANEOUS
Qty: 30 ML | Refills: 11 | Status: SHIPPED | OUTPATIENT
Start: 2024-02-19 | End: 2024-05-07 | Stop reason: SDUPTHER

## 2024-02-19 RX ORDER — INSULIN ASPART INJECTION 100 [IU]/ML
INJECTION, SOLUTION SUBCUTANEOUS
Qty: 35 PEN | Refills: 3
Start: 2024-02-19 | End: 2024-05-07 | Stop reason: SDUPTHER

## 2024-02-19 RX ORDER — INSULIN ASPART INJECTION 100 [IU]/ML
INJECTION, SOLUTION SUBCUTANEOUS
Qty: 35 PEN | Refills: 3
Start: 2024-02-19 | End: 2024-02-19 | Stop reason: SDUPTHER

## 2024-02-19 RX ORDER — INSULIN HUMAN 4 1/1
4 POWDER, METERED RESPIRATORY (INHALATION) 3 TIMES DAILY
Start: 2024-02-19 | End: 2024-05-07

## 2024-02-19 NOTE — ASSESSMENT & PLAN NOTE
Body mass index is 31.48 kg/m².  Increases insulin resistance.   Discussed DM diet and exercise.

## 2024-02-19 NOTE — Clinical Note
She is supposed to see isaac tomorrow for insulin pump start-- but she does not have the pump due to cost  We are going to try the Omnipod 5 instead.  I sent the prescription for the Omnipod 5 over to [facility]-- can we call them to check the cost so we can communicate that to the patient to see if it is financially feasible.  Can we also see if she would be able to  the Omnipod 5 today or tomorrow morning because she is supposed to meet with Isaac at 1 pm to start it.  If she has not able to get the pump in time then we will need to reschedule her insulin pump start with Isaac.  And then she would need to see Isaac 1 week later for a follow-up pump start Please let me know what happens

## 2024-02-19 NOTE — PROGRESS NOTES
The patient location is: [facility]   The chief complaint leading to consultation is:  Diabetes management follow-up visit    Visit type: audiovisual    Face to Face time with patient: 20 minutes   30 minutes of total time spent on the encounter, which includes face to face time and non-face to face time preparing to see the patient (eg, review of tests), Obtaining and/or reviewing separately obtained history, Documenting clinical information in the electronic or other health record, Independently interpreting results (not separately reported) and communicating results to the patient/family/caregiver, or Care coordination (not separately reported).         Each patient to whom he or she provides medical services by telemedicine is:  (1) informed of the relationship between the physician and patient and the respective role of any other health care provider with respect to management of the patient; and (2) notified that he or she may decline to receive medical services by telemedicine and may withdraw from such care at any time.    Notes:       CC:   Chief Complaint   Patient presents with    Diabetes Mellitus       HPI: Leona Tyler is a 43 y.o. female presents for a follow up visit today for the management of T1DM     She was diagnosed with Type 1 diabetes at 9 years old. She did use a [employer] pump shortly after but stopped d/t poor glucose control and frequent severe hypoglycemia   Moved to [place] in late 2022-- was in [place]       Family hx of diabetes: mother (T2DM)  and father (T1)   Hospitalized for diabetes: Yes - for DKA several times; last episode was ~ 2008 while on insulin pump.  Insulin therapy: att he time of diagnosis     No personal or FH of thyroid cancer or personal of pancreatic cancer or pancreatitis.    Was started on Afreeza in [place]= she feels like since she started using the Afreeza- she has issues with her throat- constant coughing - tickle in the throat       -- federal  "    Almost 6 year old son   4 year old twins - boys       Our last visit was in 2024   Following that visit she met with diabetes education for a pump evaluation and decided on the Tandem insulin pump   We submitted the paperwork - $1100 before she meets her deductible and after she meets her deductible it will be $800 ---she is not able to afford the tandem pump  She is supposed to meet with isaac this week to start the pump tomorrow but she does not have her pump   At the last visit we adjusted her ISF and her ICR   She scheduled today because hse was concerned about her sugars being high   She is adamant that she has not missing any doses of insulin.  The insulin is new and is not .  Denies exposing the insulin to heat.  Denies any absorption issues.  Denies insulin leaking out when she injects  In between our last visit in today we ended up changing her Fiasp to Lyumjev -- because she felt like the Fiasp was not working and she wanted to try something else.  Since changing her over her blood sugars are even higher  She wants to revisit the Afreeza that she has at home.  Previously taking 4 units for correction  She does note that her blood sugar readings were running much higher when she was working long hours for [employer]   See below                 DIABETES COMPLICATIONS: none      Diabetes Management Status    ASA:  No    Statin: Not taking  ACE/ARB: Not taking    Screening or Prevention Patient's value Goal Complete/Controlled?   HgA1C Testing and Control   Lab Results   Component Value Date    HGBA1C 7.8 (H) 2024      Annually/Less than 8% No   Lipid profile : 10/31/2023 Annually No   LDL control Lab Results   Component Value Date    LDLCALC 103.8 10/31/2023    Annually/Less than 100 mg/dl  No   Nephropathy screening Lab Results   Component Value Date    LABMICR <5.0 10/31/2023     No results found for: "PROTEINUA" Annually No   Blood pressure BP Readings from Last 1 Encounters: "   24 125/81    Less than 140/90 Yes   Dilated retinal exam Most Recent Eye Exam Date: Not Found Annually Yes   Foot exam   : 2023 Annually No       CURRENT A1C:    Hemoglobin A1C   Date Value Ref Range Status   2024 7.8 (H) 4.0 - 5.6 % Final     Comment:     ADA Screening Guidelines:  5.7-6.4%  Consistent with prediabetes  >or=6.5%  Consistent with diabetes    High levels of fetal hemoglobin interfere with the HbA1C  assay. Heterozygous hemoglobin variants (HbS, HgC, etc)do  not significantly interfere with this assay.   However, presence of multiple variants may affect accuracy.     10/31/2023 7.5 (H) 4.0 - 5.6 % Final     Comment:     ADA Screening Guidelines:  5.7-6.4%  Consistent with prediabetes  >or=6.5%  Consistent with diabetes    High levels of fetal hemoglobin interfere with the HbA1C  assay. Heterozygous hemoglobin variants (HbS, HgC, etc)do  not significantly interfere with this assay.   However, presence of multiple variants may affect accuracy.         GOAL A1C: 6.5% without hypoglycemia       DM MEDICATIONS USED IN THE PAST: Basaglar   Fiasp   Afreeza -sore throat   Xultophy   Dexcom G6   Karina 2   Levemir   Lantus   Novolog   Humalog   Regular   NPH   Toujeo   Lyumjev       CURRENT DIABETES MEDICATIONS:   Toujeo 34 units nightly   Lyumjev ac based off ICR of 1:6 -- ISF 25 and target 120  Wakes up high she will give correction     Insulin:  pens.    Missed doses: denies       BLOOD GLUCOSE MONITORING:    Sensor type: Dexcom G6   Average BG readin  Time in range: 32%  21% high   45% very high   1% low   <1% very low   Estimated A1c is 8.8%    Site change:q10 days    2 weeks prior - BG was 213  32% in range   30% high   33% very high   3% low   2% very low   8.6% estimated A1c       Supplies: DMS       Sensor was downloaded in clinic today and reviewed with patient.   Please see attached document for download.         HYPOGLYCEMIA:  Yes 1% low   <1% very low   2 weeks prior --3%  low -- 2% very low   Glucagon kit:Baqsimi   Medic alert bracelet: denies         MEALS: eating 3 meals per day   Feels confident with CHO counting   Drinks: water and coffee and green tea          CURRENT EXERCISE:  Yes-- started recently       Review of Systems  Review of Systems   Constitutional:  Positive for fatigue and unexpected weight change (weight gain). Negative for appetite change.   HENT:  Negative for trouble swallowing and voice change.    Eyes:  Negative for visual disturbance.   Respiratory:  Negative for shortness of breath.    Cardiovascular:  Negative for chest pain.   Gastrointestinal:  Negative for nausea.   Endocrine: Negative for polydipsia, polyphagia and polyuria.   Genitourinary:         No Nocturia    Skin:  Negative for wound.   Neurological:  Negative for numbness.       Physical Exam   Physical Exam  Vitals and nursing note reviewed.   Constitutional:       General: She is not in acute distress.     Appearance: She is obese. She is not ill-appearing.   HENT:      Head: Normocephalic and atraumatic.   Pulmonary:      Effort: Pulmonary effort is normal.   Neurological:      General: No focal deficit present.      Mental Status: She is alert and oriented to person, place, and time.   Psychiatric:         Mood and Affect: Mood normal.         Behavior: Behavior normal.         Thought Content: Thought content normal.         Judgment: Judgment normal.         FOOT EXAMINATION:  Deferred due to virtual visit        Lab Results   Component Value Date    TSH 1.030 10/31/2023           Type 1 diabetes mellitus with hyperglycemia  Uncontrolled   Strongly encouraged an insulin pump -- recommend an AID system --she can not afford the tandem pump due to her high deductible  We submitted the paperwork - $1100 before she meets her deductible and after she meets her deductible it will be $800 ---she is not able to afford the tandem pump  She is willing to try the Omnipod 5 since it goes through  pharmacy  Lots of variability to her readings. Highs and lows   She is adamant that she has not missing doses of insulin in the insulin has not been exposed to heat.  Appears to be having significant prandial excursions  Feels confident with her carbohydrate counting  Readings are running much higher since we changed her Fiasp to Lyumjev         -- Medication Changes:       Adjust your insulin to carb ratio to 1:5  So 1 unit for every 5 grams of carbohydrates     Continue correction factor  1:25   So 1 unit for every 25 points above target   Target is still 120    So for example-- BG is 200- subtract 120 from it-- 80 points above target-- divide by 25-- 3 units for correction   Do this on a PREMEAL reading     Continue the Toujeo 34 units -- try moving to daily to help with daytime highs in case she is burning it off during the day    She will change back to Fiasp instead of Lyumjev     We will submit for the Omnipod 5 to see if that is affordable for patient since it is submitted through the pharmacy    She will go back to using Afreeza to help with post prandial excursions -- 4 units for correction if still high ( >200) 1 hour after eating     Pump settings if she moves forward:   She will use Fiasp in the pump     Basal: 1.25 units/hr   ICR: 1:8  ISF: 1:30  Target: 120  IOB: 3 hours       -- Reviewed goals of therapy are to get the best control we can without hypoglycemia.  -- Reviewed patient's current insulin regimen. Clarified proper insulin dose and timing in relation to meals, etc. Insulin injection sites and proper rotation instructed.   -- Advised frequent self blood glucose monitoring.  Patient encouraged to document glucose results and bring them to every clinic visit.-- continue dexcom G6 personal CGM- supplies from [employer]   -- Hypoglycemia precautions discussed. Instructed on precautions before driving.    -- Call for Bg repeatedly < 70 or > 200.   -- Close adherence to lifestyle changes recommended.   --  Periodic follow ups for eye evaluations, foot care and dental care suggested.  -- Refer to diabetes education-- insulin pump start-- Omnipod 5     Patient has diabetes mellitus and manages diabetes with intensive insulin regimen and uses prandial and basal insulin daily  Patient requires a therapeutic CGM and is willing to use therapeutic CGM for the necessary frequent adjustments of insulin therapy.  I have completed an in-person visit during the previous 6 months and will continue to have in-person visits every 6 months to assess adherence to their CGM regimen and diabetes treatment plan.  Due to COVID pandemic and need for remote monitoring this tool is medically necessary          Type 1 diabetes mellitus with hypoglycemia  Reviewed hypoglycemia management: Treat with 1/2 glass of juice, 1/2 can regular coke, or 4 glucose tablets.   Monitor and repeat treatment every 15 minutes until BG is >70   Then have a snack, which includes a complex carbohydrate and protein.    Has Baqsimi   Recommend medic alert bracelet     Class 1 obesity due to excess calories with serious comorbidity and body mass index (BMI) of 31.0 to 31.9 in adult  Body mass index is 31.48 kg/m².  Increases insulin resistance.   Discussed DM diet and exercise.         This includes face to face time and non-face to face time preparing to see the patient (eg, review of tests), obtaining and/or reviewing separately obtained history, documenting clinical information in the electronic or other health record, independently interpreting results and communicating results to the patient/family/caregiver, or care coordinator.        Follow up in about 3 months (around 5/19/2024).  She already has a follow up scheduled with me for May -- we need to get her in with education to get her on the pump and she will follow closely with education         No orders of the defined types were placed in this encounter.      Recommendations were discussed with the  patient in detail  The patient verbalized understanding and agrees with the plan outlined as above.     This note was partly generated with [employer] voice recognition software. I apologize for any possible typographical errors.

## 2024-02-19 NOTE — TELEPHONE ENCOUNTER
----- Message from Klaudia Rankin LPN sent at 2/19/2024 10:19 AM CST -----  Pharmacy stated that omnipod 5 intro kit and pods were covered along with no cost of fiasp insulin, I called pt no answer sent message via portal    ----- Message -----  From: Danita Lipscomb NP  Sent: 2/19/2024   9:05 AM CST  To: Ruel Samayoa Staff    She is supposed to see kellee tomorrow for insulin pump start-- but she does not have the pump due to cost   We are going to try the Omnipod 5 instead.  I sent the prescription for the Omnipod [facility] over to [facility]-- can we call them to check the cost so we can communicate that to the patient to see if it is financially feasible.  Can we also see if she would be able to  the Omnipod 5 today or tomorrow morning because she is supposed to meet with Kellee at 1 pm to start it.  If she has not able to get the pump in time then we will need to reschedule her insulin pump start with Kellee.  And then she would need to see Kellee 1 week later for a follow-up pump start  Please let me know what happens

## 2024-02-19 NOTE — ASSESSMENT & PLAN NOTE
Uncontrolled   Strongly encouraged an insulin pump -- recommend an AID system --she can not afford the tandem pump due to her high deductible  We submitted the paperwork - $1100 before she meets her deductible and after she meets her deductible it will be $800 ---she is not able to afford the tandem pump  She is willing to try the Omnipod 5 since it goes through pharmacy  Lots of variability to her readings. Highs and lows   She is adamant that she has not missing doses of insulin in the insulin has not been exposed to heat.  Appears to be having significant prandial excursions  Feels confident with her carbohydrate counting  Readings are running much higher since we changed her Fiasp to Lyumjev         -- Medication Changes:       Adjust your insulin to carb ratio to 1:5  So 1 unit for every 5 grams of carbohydrates     Continue correction factor  1:25   So 1 unit for every 25 points above target   Target is still 120    So for example-- BG is 200- subtract 120 from it-- 80 points above target-- divide by 25-- 3 units for correction   Do this on a PREMEAL reading     Continue the Toujeo 34 units -- try moving to daily to help with daytime highs in case she is burning it off during the day    She will change back to Fiasp instead of Lyumjev     We will submit for the Omnipod 5 to see if that is affordable for patient since it is submitted through the pharmacy    She will go back to using Afreeza to help with post prandial excursions -- 4 units for correction if still high ( >200) 1 hour after eating     Pump settings if she moves forward:   She will use Fiasp in the pump     Basal: 1.25 units/hr   ICR: 1:8  ISF: 1:30  Target: 120  IOB: 3 hours       -- Reviewed goals of therapy are to get the best control we can without hypoglycemia.  -- Reviewed patient's current insulin regimen. Clarified proper insulin dose and timing in relation to meals, etc. Insulin injection sites and proper rotation instructed.   -- Advised  frequent self blood glucose monitoring.  Patient encouraged to document glucose results and bring them to every clinic visit.-- continue dexcom G6 personal CGM- supplies from [employer]   -- Hypoglycemia precautions discussed. Instructed on precautions before driving.    -- Call for Bg repeatedly < 70 or > 200.   -- Close adherence to lifestyle changes recommended.   -- Periodic follow ups for eye evaluations, foot care and dental care suggested.  -- Refer to diabetes education-- insulin pump start-- Omnipod 5     Patient has diabetes mellitus and manages diabetes with intensive insulin regimen and uses prandial and basal insulin daily  Patient requires a therapeutic CGM and is willing to use therapeutic CGM for the necessary frequent adjustments of insulin therapy.  I have completed an in-person visit during the previous 6 months and will continue to have in-person visits every 6 months to assess adherence to their CGM regimen and diabetes treatment plan.  Due to COVID pandemic and need for remote monitoring this tool is medically necessary

## 2024-02-19 NOTE — Clinical Note
Hey!  So she cannot afford the tandem insulin pump due to her high deductible We are going to try the Omnipod 5 instead She is supposed to see you tomorrow but I told her if she is unable to get the supplies today that they need to reschedule.  I told her it is very unlikely that she will have it for tomorrow but we could try Asked the staff to call the pharmacy to see how much it would cost her and to see if it is available We did discuss that if she does not have it she needs to reschedule with you for a pump start if she is able to get the Omnipod 5 and then to see you a week later for a pump start follow-up

## 2024-02-19 NOTE — TELEPHONE ENCOUNTER
Pharmacy stated that Omnipod 5 pods and intro kit was covered and fiasp insulin covered with no cost

## 2024-02-21 ENCOUNTER — PATIENT MESSAGE (OUTPATIENT)
Dept: DIABETES | Facility: CLINIC | Age: 44
End: 2024-02-21
Payer: COMMERCIAL

## 2024-03-11 ENCOUNTER — TELEPHONE (OUTPATIENT)
Dept: DIABETES | Facility: CLINIC | Age: 44
End: 2024-03-11
Payer: COMMERCIAL

## 2024-03-11 NOTE — TELEPHONE ENCOUNTER
----- Message from Beata Whitmore sent at 3/11/2024  3:54 PM CDT -----  Contact: MsJaxon Cheyenne phone# [phone] option # 2  Ms. Dayami farley Pharmacy Tech at [facility] said that a duplicate of the patients Flex Pen was order and she would like to know if she should fill the patients pen?     Patients ref# [id number].

## 2024-03-12 ENCOUNTER — TELEPHONE (OUTPATIENT)
Dept: DIABETES | Facility: CLINIC | Age: 44
End: 2024-03-12
Payer: COMMERCIAL

## 2024-03-12 NOTE — TELEPHONE ENCOUNTER
Spoke to [facility] pharmacy, clarified that pt is both using fiasp pens for back up and fiasp vials within the omnipod , verbalized understanding

## 2024-03-18 ENCOUNTER — TELEPHONE (OUTPATIENT)
Dept: DIABETES | Facility: CLINIC | Age: 44
End: 2024-03-18
Payer: COMMERCIAL

## 2024-03-18 ENCOUNTER — NUTRITION (OUTPATIENT)
Dept: DIABETES | Facility: CLINIC | Age: 44
End: 2024-03-18
Payer: COMMERCIAL

## 2024-03-18 VITALS — HEIGHT: 67 IN | BODY MASS INDEX: 31.55 KG/M2 | WEIGHT: 201 LBS

## 2024-03-18 DIAGNOSIS — E10.65 TYPE 1 DIABETES MELLITUS WITH HYPERGLYCEMIA: Primary | ICD-10-CM

## 2024-03-18 DIAGNOSIS — E10.649 TYPE 1 DIABETES MELLITUS WITH HYPOGLYCEMIA AND WITHOUT COMA: ICD-10-CM

## 2024-03-18 PROCEDURE — G0108 DIAB MANAGE TRN  PER INDIV: HCPCS | Mod: S$GLB,,, | Performed by: DIETITIAN, REGISTERED

## 2024-03-18 PROCEDURE — 99999 PR PBB SHADOW E&M-EST. PATIENT-LVL IV: CPT | Mod: PBBFAC,,, | Performed by: DIETITIAN, REGISTERED

## 2024-03-18 RX ORDER — PROMETHAZINE HYDROCHLORIDE AND DEXTROMETHORPHAN HYDROBROMIDE 6.25; 15 MG/5ML; MG/5ML
5 SYRUP ORAL EVERY 6 HOURS
COMMUNITY
Start: 2024-03-11

## 2024-03-18 RX ORDER — BENZONATATE 200 MG/1
200 CAPSULE ORAL
COMMUNITY
Start: 2024-03-11 | End: 2024-05-07

## 2024-03-18 RX ORDER — AZITHROMYCIN 250 MG/1
TABLET, FILM COATED ORAL
COMMUNITY
Start: 2024-03-11 | End: 2024-05-07

## 2024-03-18 NOTE — TELEPHONE ENCOUNTER
----- Message from Kellee Javier RD, CDE sent at 3/18/2024  1:57 PM CDT -----  Hey, she is using the dexcom G7 and will need to go to the G6 for connection with the omnipod 5  ----- Message -----  From: Danita Lipscomb NP  Sent: 2/19/2024   9:10 AM CDT  To: Kellee Javier RD, RACQUEL    Hey!   So she cannot afford the tandem insulin pump due to her high deductible  We are going to try the Omnipod 5 instead  She is supposed to see you tomorrow but I told her if she is unable to get the supplies today that they need to reschedule.  I told her it is very unlikely that she will have it for tomorrow but we could try  Asked the staff to call the pharmacy to see how much it would cost her and to see if it is available  We did discuss that if she does not have it she needs to reschedule with you for a pump start if she is able to get the Omnipod 5 and then to see you a week later for a pump start follow-up

## 2024-03-18 NOTE — PROGRESS NOTES
"Diabetes Care Specialist Follow-up Note  Author: Kellee Javier RD, CDE  Date: 3/18/2024    Program Intake  Reason for Diabetes Program Visit:: Intervention  Type of Intervention:: Individual  Individual: Device Training  Device Training: Insulin Pump Start  Current diabetes risk level:: low (HgbA1c 7.8)  In the last 12 months, have you:: none  Permission to speak with others about care:: no  Continuous Glucose Monitoring  Patient has CGM: Yes  Personal CGM type:: Dexcom G7    Lab Results   Component Value Date    HGBA1C 7.8 (H) 01/03/2024     A1c Pre Diabetes Care Specialist Intervention:  7.8%    Clinical    Weight: 91.2 kg (201 lb)   Height: 5' 7" (170.2 cm)   Body mass index is 31.48 kg/m².    Patient Health Rating  Compared to other people your age, how would you rate your health?: Good    Problem Review  Reviewed health maintenance: yes         Medication Information  Medication adherence impacting ability to self-manage diabetes?: No    Labs  Lab Compliance Barriers: No    Nutritional Status  Current nutritional status an area of need that is impacting patient's ability to self-manage diabetes?: No    Physical activity/Exercise:   No change    SMBG: using the dexcom G7      Additional Social History    Support  Does the current support meet the patient's needs?: Yes  Is Support an area impacting ability to self-manage diabetes?: No    Access to Mass Media & Technology  Media or technology needs impacting ability to self-manage diabetes?: No    Cognitive/Behavioral Health  Cognitive or behavioral barriers impacting ability to self-manage diabetes?: No    Culture/Mormonism  Culture or Shinto beliefs that may impact ability to access healthcare: No    Communication  Communication needs impacting ability to self-manage diabetes?: No    Health Literacy  Health literacy needs impacting ability to self-manage diabetes?: No      Diabetes Self-Management Skills Assessment     Diabetes Disease Process/Treatment " Options  Diabetes Disease Process/Treatment Options: Skills Assessment Completed: No  Assessment indicates:: Adequate understanding  Deferred due to:: Other (comment) (previously completed, there has been no change and patient has adequate understanding)  Area of need?: No    Nutrition/Healthy Eating  Nutrition/Healthy Eating Skills Assessment Completed:: No  Assessment indicates:: Adequate understanding  Deffered due to:: Other (comment) (previously completed, there has been no change and patient has adequate understanding)  Area of need?: No    Physical Activity/Exercise  Patient's daily activity level:: lightly active  Patient formally exercises outside of work.: no  Reasons for not exercising:: time constraints  Patient can identify forms of physical activity.: yes  Stated forms of physical activity:: swimming, other (see comments) (walking, bike riding, weight lifting)  Patient can identify reasons why exercise/physical activity is important in diabetes management.: yes  Identified reasons:: lowers blood glucose, blood pressure, and cholesterol, strengthens heart, muscles, and bones  Physical Activity/Exercise Skills Assessment Completed: : Yes  Assessment indicates:: Adequate understanding  Area of need?: No    Medications  Patient is able to describe current diabetes management routine.: yes  Diabetes management routine:: diet, insulin  Patient is able to identify current diabetes medications, dosages, and appropriate timing of medications.: yes  Patient understands the purpose of the medications taken for diabetes.: yes  Patient reports problems or concerns with current medication regimen.: yes  Medication regimen problems/concerns:: other (see comments) (training on the omnipod 5)  Medication Skills Assessment Completed:: Yes  Assessment indicates:: Instruction Needed, Knowledge deficit  Area of need?: Yes    Home Blood Glucose Monitoring  Personal CGM type:: Dexcom G7  Home Blood Glucose Monitoring Skills  Assessment Completed: : No  Assessment indicates:: Adequate understanding  Deferred due to:: Other (comment) (previously completed, there has been no change and patient has adequate understanding)  Area of need?: No    Acute Complications  Acute Complications Skills Assessment Completed: : No  Assessment indicates:: Adequate understanding  Deffered due to:: Other (comment) (previously completed, there has been no change and patient has adequate understanding)  Area of need?: No    Chronic Complications  Chronic Complications Skills Assessment Completed: : No  Deferred due to:: Time  Area of need?: Deferred    Psychosocial/Coping  Psychosocial/Coping Skills Assessment Completed: : No  Deffered due to:: Time  Area of need?: Deferred      During today's follow-up visit,  the following areas required further assessment and content was provided/reviewed.    Based on today's diabetes care assessment, the following areas of need were identified:          3/18/2024    12:01 AM   Social   Support No   Access to Mass Media/Tech No   Cognitive/Behavioral Health No   Culture/Orthodox No   Communication No   Health Literacy No            3/18/2024    12:01 AM   Clinical   Medication Adherence No   Lab Compliance No   Nutritional Status No            3/18/2024    12:01 AM   Diabetes Self-Management Skills   Diabetes Disease Process/Treatment Options No   Nutrition/Healthy Eating No   Physical Activity/Exercise No   Medication Yes, Insulin Pump Education  OMNI POD 5 INSULIN PUMP START  Explained SmartAdjust Technology - Every 5 minutes, the system automatically increases, decreases, or pauses insulin delivery based on your customized target glucose--helping to protect against highs and lows, day and night.  Automated Mode vs Manual Mode vs Limited Automated Mode  Downloading the Jose and ProConnect ([facility])  Pod and Dexcom need to be in line of site of each other  Inputting Dexcom Transmitter Code into Jose or Handheld  Device  Dexcom communicates with the pod directly and the Dexcom G6 benjie  Activity Feature  Changing the target and the length of time insulin is on board will adjust automated mode  Changing ICR/Basal Rates/ISF will only change setting in manual mode  SmartBolus Calculator - incorporates CGM data and trend data  Setup podder central account and linked [employer] account  Patient educated on insulin pump therapy, what a basal rate and bolus dose are, when to change pod, demonstrated how to prepare the syringe and pod for use with the pump, discussed ease of usage, basal and bolus, carbohydrate to insulin ratio, correction factors, approved areas to insert set, carbohydrate counting, error messages, explained the basal rates - patient will receive a little bit of insulin throughout 24 hours, bolus dose are delivered delivered by the inputting grams of carbohydrates, explained that patient will be counting carbohydrates and checking blood glucose before each meal, discussed when to change the pod, demonstrated how to fill the pod with insulin, how to apply pod and insert the needle, explained and demonstrated how to safely retract the needle and remove the pod, discussed ease of usage, carbohydrate counting, demonstrated applying device, inserting needle, administering bolus insulin, retracting needle, and removing/disposing of pod. Patient states understanding and performed return demonstration. Patient started first pod in office. Patient provided with written literature and CDE contact information      Pump training was provided per Omni Pod protocol.  Patient is new to insulin pump therapy.      All settings obtained from Danita Lipscomb's last office visit note.   Basal:  12AM: 1.25 units/hr     Max basal rate: 5 u/hr     Bolus:  CHO ratio: 1:8  ISF: 1:30  Glucose target : 120 mg/dL  Correct  over: 120mg/dl  Active insulin time: 3 hours  Max bolus: 30 units     Low reservoir insulin alarm: 10 units  Change pod alarm:  every 3 days      Details of pump therapy were covered included following controller features and programming, pod activation, pod site selection and rotation, automatic pod priming and insertion, setting & editing basal rates in manual mode, giving bolus and other features in the set-up menu.  The following regarding the Omnipod 5 was covered:  During your first Pod wear, since no recent history is available, the Omnipod 5 System uses only your active Basal Program from Manual Mode as a starting point to adjust your insulin. After 48 hours of history is collected, which usually happens with the first Pod wear, Moovit technology stops adjusting insulin against your active Basal Program and starts using the Adaptive Basal Rate for your automated insulin delivery with your next Pod change. With each Pod change, insulin delivery information is sent and saved in the Omnipod 5 Jose so that the next Pod that is started is updated with the new Adaptive Basal Rate. With each new Pod activation, the system adapts insulin delivery based on physiological needs and total daily insulin (TDI) delivered. After 2-3 Pod changes, adaptation generally stabilizes and automated insulin delivery is based on this adaptation.  Patient demonstrated ability to program controller, activate and insert pod using aseptic technique.  Patient demonstrated ability to program Dexcom transmitter into controller and start automated limited mode.    Instructed pt on use of basic pump features ie...give a bolus, pause insulin, switch from manual to automated mode.  Reviewed features available in manual mode verses automated mode.   Reviewed when and how to use activity function in automated mode.  Reviewed site selection of pods, rotation of sites and hard stop to change pod every 72 hrs.   Instructed that insulin vial is good out of refrigeration for up to 28-30 days.   Reviewed treatment of hypoglycemia, hyperglycemia; sick day care, DKA, and  troubleshooting of pump.  Advised to carry back-up supplies with her and discussed steps to take in case of connection loss.   Omni Pod 24-hour support line provided.       Jluisder username: brant Fragoso password: [email or web address]  Device PIN: [id number]     Home Blood Glucose Monitoring No   Acute Complications No   Chronic Complications Deferred   Psychosocial/Coping Deferred        Today's interventions were provided through individual discussion, instruction, and written materials were provided.    Patient verbalized understanding of instruction and written materials.  Pt was able to return back demonstration of instructions today. Patient understood key points, needs reinforcement and further instruction.     Diabetes Self-Management Care Plan Review and Evaluation of Progress:    During today's follow-up Leona's Diabetes Self-Management Care Plan progress was reviewed and progress was evaluated including his/her input. Leona has agreed to continue his/her journey to improve/maintain overall diabetes control by continuing to set health goals. See care plan progress below.      Care Plan: Diabetes Management   Updates made since 2/17/2024 12:00 AM        Problem: Medications         Goal: Patient Agrees to take Diabetes Medication(s) fiasp via omnipod 5 insulin delivery system as prescribed for the next 6 months.    Start Date: 3/18/2024   Expected End Date: 9/18/2024   This Visit's Progress: Deferred   Recent Progress: Deferred   Priority: High   Barriers: No Barriers Identified        Task: Reviewed with patient all current diabetes medications and provided basic review of the purpose, dosage, frequency, side effects, and storage of both oral and injectable diabetes medications. Completed 3/18/2024        Task: Explained smart adjust Technology Completed 3/18/2024        Task: patient demonstrated ability to program pump, activate and insert infusion set using aseptic technique. Completed 3/18/2024        Task: Reviewed  and reconciled current medication list today. Completed 3/18/2024        Task: Instructed pt on use of basic pump features ie...give a bolus, pause insulin, switch from manual to automated mode. Completed 3/18/2024        Task: Reviewed features available in manual mode verses automated mode. Completed 3/18/2024        Task: Reviewed when and how to use activity function in automated mode. Completed 3/18/2024        Task: Reviewed site selection of infusion set and rotation of sites. Completed 3/18/2024        Task: Instructed that insulin vial is good out of refrigeration for up to 28-30 days. Completed 3/18/2024        Task: reviewed appropriate injection site selection and importance of rotating sites. Completed 3/18/2024        Task: Advised to carry back-up supplies with them and discussed steps to take in case of connection loss. Completed 3/18/2024        Task: Discussed guidelines for preventing, detecting and treating hypoglycemia and hyperglycemia and reviewed the importance of meal and medication timing with diabetes mediations for prevention of hypoglycemia and maximum drug benefit. Completed 3/18/2024        Task: Details of pump therapy were covered including pump/benjie features and programming Completed 3/18/2024        Task: Reviewed setting & editing basal rates in manual mode, giving bolus and other features in the set-up menu. Completed 3/18/2024        Task: Omnipod 24-hour support line provided. Completed 3/18/2024        Task: Patient provided with insulet and [employer] usernames and passwords, also documented in chart note and blue sticky note in Epic Completed 3/18/2024        Task: Set up omnipod benjie on patients phone, patient will be using benjie rather than          Task: Omnipod and Dexcom data was downloaded and reviewed with patient and provider, any necessary adjustments were made         Task: Omnipod and Dexcom data was downloaded and reviewed with patient and provider, any  necessary adjustments were made         Task: Omnipod and Dexcom data was downloaded and reviewed with patient and provider, any necessary adjustments were made         Task: Omnipod and Dexcom data was downloaded and reviewed with patient and provider, any necessary adjustments were made         Task: Omnipod and Dexcom data was downloaded and reviewed with patient and provider, any necessary adjustments were made           Follow Up Plan     Follow up in about 1 week (around 3/25/2024) for Personal CGM Upload, Insulin Pump Upload.    Today's care plan and follow up schedule was discussed with patient.  Leona verbalized understanding of the care plan, goals, and agrees to follow up plan.        The patient was encouraged to communicate with his/her health care provider/physician and care team regarding his/her condition(s) and treatment.  I provided the patient with my contact information today and encouraged to contact me via phone or [facility] Patient Portal as needed.     Length of Visit   Total Time: 120 Minutes

## 2024-03-19 ENCOUNTER — TELEPHONE (OUTPATIENT)
Dept: DIABETES | Facility: CLINIC | Age: 44
End: 2024-03-19
Payer: COMMERCIAL

## 2024-03-19 ENCOUNTER — PATIENT MESSAGE (OUTPATIENT)
Dept: DIABETES | Facility: CLINIC | Age: 44
End: 2024-03-19
Payer: COMMERCIAL

## 2024-03-19 NOTE — TELEPHONE ENCOUNTER
----- Message from Klaudia Rankin LPN sent at 3/19/2024  1:35 PM CDT -----  Can we call [employer] and see what is going on with her dexcom-- she is supposed to be on the dexcom  G6?? See message from isaac-- she was on the G7??   At our recent visit she was on the G6 - so am not sure where the g7 came from??       Pt has to give [employer] a call , because they stated she was getting  her supplies from another supplier, I called her no answer I also sent a portal message          28-Sep-2018 20:27

## 2024-04-03 ENCOUNTER — PATIENT MESSAGE (OUTPATIENT)
Dept: DIABETES | Facility: CLINIC | Age: 44
End: 2024-04-03
Payer: COMMERCIAL

## 2024-04-16 ENCOUNTER — OFFICE VISIT (OUTPATIENT)
Dept: OBSTETRICS AND GYNECOLOGY | Facility: CLINIC | Age: 44
End: 2024-04-16
Payer: COMMERCIAL

## 2024-04-16 VITALS
DIASTOLIC BLOOD PRESSURE: 79 MMHG | WEIGHT: 196.63 LBS | HEIGHT: 67 IN | HEART RATE: 71 BPM | BODY MASS INDEX: 30.86 KG/M2 | SYSTOLIC BLOOD PRESSURE: 119 MMHG

## 2024-04-16 DIAGNOSIS — N90.89 VULVAR LESION: Primary | ICD-10-CM

## 2024-04-16 DIAGNOSIS — Z11.3 SCREEN FOR STD (SEXUALLY TRANSMITTED DISEASE): ICD-10-CM

## 2024-04-16 PROCEDURE — 87491 CHLMYD TRACH DNA AMP PROBE: CPT | Performed by: OBSTETRICS & GYNECOLOGY

## 2024-04-16 PROCEDURE — 3078F DIAST BP <80 MM HG: CPT | Mod: CPTII,S$GLB,, | Performed by: OBSTETRICS & GYNECOLOGY

## 2024-04-16 PROCEDURE — 3051F HG A1C>EQUAL 7.0%<8.0%: CPT | Mod: CPTII,S$GLB,, | Performed by: OBSTETRICS & GYNECOLOGY

## 2024-04-16 PROCEDURE — 1159F MED LIST DOCD IN RCRD: CPT | Mod: CPTII,S$GLB,, | Performed by: OBSTETRICS & GYNECOLOGY

## 2024-04-16 PROCEDURE — 3008F BODY MASS INDEX DOCD: CPT | Mod: CPTII,S$GLB,, | Performed by: OBSTETRICS & GYNECOLOGY

## 2024-04-16 PROCEDURE — 87529 HSV DNA AMP PROBE: CPT | Mod: 59 | Performed by: OBSTETRICS & GYNECOLOGY

## 2024-04-16 PROCEDURE — 3074F SYST BP LT 130 MM HG: CPT | Mod: CPTII,S$GLB,, | Performed by: OBSTETRICS & GYNECOLOGY

## 2024-04-16 PROCEDURE — 99999 PR PBB SHADOW E&M-EST. PATIENT-LVL IV: CPT | Mod: PBBFAC,,, | Performed by: OBSTETRICS & GYNECOLOGY

## 2024-04-16 PROCEDURE — 99213 OFFICE O/P EST LOW 20 MIN: CPT | Mod: S$GLB,,, | Performed by: OBSTETRICS & GYNECOLOGY

## 2024-04-16 RX ORDER — VALACYCLOVIR HYDROCHLORIDE 1 G/1
1000 TABLET, FILM COATED ORAL DAILY
Qty: 30 TABLET | Refills: 11 | Status: SHIPPED | OUTPATIENT
Start: 2024-04-16

## 2024-04-16 RX ORDER — NORETHINDRONE ACETATE AND ETHINYL ESTRADIOL 1.5-30(21)
1 KIT ORAL DAILY
COMMUNITY

## 2024-04-16 RX ORDER — VALACYCLOVIR HYDROCHLORIDE 1 G/1
1000 TABLET, FILM COATED ORAL 2 TIMES DAILY
Qty: 14 TABLET | Refills: 0 | Status: SHIPPED | OUTPATIENT
Start: 2024-04-16 | End: 2024-04-23

## 2024-04-16 RX ORDER — LIDOCAINE AND PRILOCAINE 25; 25 MG/G; MG/G
CREAM TOPICAL
Qty: 5 G | Refills: 0 | Status: SHIPPED | OUTPATIENT
Start: 2024-04-16

## 2024-04-16 RX ORDER — INSULIN HUMAN 4-8-12(60)
KIT INHALATION
COMMUNITY
End: 2024-05-07

## 2024-04-16 RX ORDER — METHYLPREDNISOLONE 4 MG/1
TABLET ORAL
COMMUNITY
End: 2024-05-07

## 2024-04-16 NOTE — PROGRESS NOTES
"Subjective     Patient ID: Leona Tyler is a 43 y.o. female.    Chief Complaint:  Vaginal bumps      History of Present Illness  HPI  42yo presents with c/o painful bumps on vagina that appears about a week ago.  No drainage.  Very tender, especially when walking/working out.  Has never had lesions like this before.  No vaginal discharge.  No other complaints today.     GYN & OB History  Patient's last menstrual period was 2024 (exact date).   Date of Last Pap: 2023    OB History    Para Term  AB Living   4 4 3 1   1   SAB IAB Ectopic Multiple Live Births         1 3      # Outcome Date GA Lbr Pillo/2nd Weight Sex Type Anes PTL Lv   4  17    M CS-Unspec  Y JEFF   3 Term            2 Term            1 Term                Review of Systems  Review of Systems   Constitutional:  Positive for fever.   HENT:  Negative for mouth sores.    Respiratory:  Negative for cough and shortness of breath.    Cardiovascular:  Negative for chest pain.   Gastrointestinal:  Negative for abdominal pain.   Genitourinary:  Positive for genital sores and vaginal pain (lesions). Negative for vaginal bleeding and vaginal discharge.   Integumentary:  Negative for rash.          Objective   Physical Exam    /79   Pulse 71   Ht 5' 7" (1.702 m)   Wt 89.2 kg (196 lb 10.4 oz)   LMP 2024 (Exact Date)   BMI 30.80 kg/m²     Gen: NAD  Resp: Normal respiratory effort  Abd: soft, NT  Pelvic: Two areas of white crusted lesions on right lower labia majora, very tender to touch.  Appears c/w HSV.  No other lesions seen.  Cx taken.  No active drainage.  Lidocaine cream applied to lesions to help with pain.   Ext: normal ROM  Psych: appropriate affect  Neuro: grossly intact       Assessment and Plan     Leona was seen today for vaginal bumps.    Diagnoses and all orders for this visit:    Vulvar lesion  -     HIV 1/2 Ag/Ab (4th Gen); Future  -     HERPES SIMPLEX 1&2 IGG; Future  -     HSV 1 & 2, IgM; " Future  -     Treponema Pallidium Antibodies IgG, IgM (Age >= 28 days); Future  -     C. trachomatis/N. gonorrhoeae by AMP DNA [facility]; Vagina  -     HSV by Rapid PCR, Non-Blood [facility]; Vaginal/Rectal; Future  -     HSV by Rapid PCR, Non-Blood [facility]; Vaginal/Rectal    Screen for STD (sexually transmitted disease)  -     HIV 1/2 Ag/Ab (4th Gen); Future  -     HERPES SIMPLEX 1&2 IGG; Future  -     HSV 1 & 2, IgM; Future  -     Treponema Pallidium Antibodies IgG, IgM (Age >= 28 days); Future  -     C. trachomatis/N. gonorrhoeae by AMP DNA [facility]; Vagina    Other orders  -     valACYclovir (VALTREX) 1000 MG tablet; Take 1 tablet (1,000 mg total) by mouth 2 (two) times daily. for 7 days  -     valACYclovir (VALTREX) 1000 MG tablet; Take 1 tablet (1,000 mg total) by mouth once daily. Start after 7-day course, for suppression.  -     LIDOcaine-prilocaine (EMLA) cream; Apply topically as needed.          Plan:  Discussed exam today with patient - appears c/w HSV, but cultures pending.  Will start treatment with Valtrex.  Dicussed suppression therapy if cx positive - will send in today, but awaiting cx.   Full STD screening today as well.   Patient also requests HSV antibodies to see if recent infection.  Understands that this will not tell us exactly when she was exposed.    Discussed HSV and ways to avoid spreading.  Encourage discussion with partner as well.   Counseling done, all questions answered.  RTC prn.

## 2024-04-19 ENCOUNTER — PATIENT MESSAGE (OUTPATIENT)
Dept: OBSTETRICS AND GYNECOLOGY | Facility: CLINIC | Age: 44
End: 2024-04-19
Payer: COMMERCIAL

## 2024-04-19 LAB
HSV1 DNA SPEC QL NAA+PROBE: NEGATIVE
HSV2 DNA SPEC QL NAA+PROBE: POSITIVE
SPECIMEN SOURCE: ABNORMAL

## 2024-04-20 LAB
C TRACH DNA SPEC QL NAA+PROBE: NOT DETECTED
N GONORRHOEA DNA SPEC QL NAA+PROBE: NOT DETECTED

## 2024-05-06 ENCOUNTER — TELEPHONE (OUTPATIENT)
Dept: DIABETES | Facility: CLINIC | Age: 44
End: 2024-05-06
Payer: COMMERCIAL

## 2024-05-07 ENCOUNTER — OFFICE VISIT (OUTPATIENT)
Dept: DIABETES | Facility: CLINIC | Age: 44
End: 2024-05-07
Payer: COMMERCIAL

## 2024-05-07 VITALS
HEIGHT: 67 IN | BODY MASS INDEX: 30.84 KG/M2 | HEART RATE: 86 BPM | DIASTOLIC BLOOD PRESSURE: 70 MMHG | OXYGEN SATURATION: 97 % | WEIGHT: 196.5 LBS | SYSTOLIC BLOOD PRESSURE: 120 MMHG

## 2024-05-07 DIAGNOSIS — Z71.9 HEALTH EDUCATION/COUNSELING: ICD-10-CM

## 2024-05-07 DIAGNOSIS — E10.65 TYPE 1 DIABETES MELLITUS WITH HYPERGLYCEMIA: Primary | ICD-10-CM

## 2024-05-07 DIAGNOSIS — E88.819 INSULIN RESISTANCE: ICD-10-CM

## 2024-05-07 DIAGNOSIS — E28.2 PCOS (POLYCYSTIC OVARIAN SYNDROME): ICD-10-CM

## 2024-05-07 DIAGNOSIS — E66.09 CLASS 1 OBESITY DUE TO EXCESS CALORIES WITH SERIOUS COMORBIDITY AND BODY MASS INDEX (BMI) OF 31.0 TO 31.9 IN ADULT: ICD-10-CM

## 2024-05-07 DIAGNOSIS — E10.649 TYPE 1 DIABETES MELLITUS WITH HYPOGLYCEMIA AND WITHOUT COMA: ICD-10-CM

## 2024-05-07 DIAGNOSIS — Z96.41 INSULIN PUMP IN PLACE: ICD-10-CM

## 2024-05-07 DIAGNOSIS — E66.09 CLASS 1 OBESITY DUE TO EXCESS CALORIES WITH SERIOUS COMORBIDITY AND BODY MASS INDEX (BMI) OF 30.0 TO 30.9 IN ADULT: ICD-10-CM

## 2024-05-07 DIAGNOSIS — Z46.81 INSULIN PUMP FITTING OR ADJUSTMENT: ICD-10-CM

## 2024-05-07 PROCEDURE — 1159F MED LIST DOCD IN RCRD: CPT | Mod: CPTII,S$GLB,, | Performed by: NURSE PRACTITIONER

## 2024-05-07 PROCEDURE — 95251 CONT GLUC MNTR ANALYSIS I&R: CPT | Mod: S$GLB,,, | Performed by: NURSE PRACTITIONER

## 2024-05-07 PROCEDURE — 3051F HG A1C>EQUAL 7.0%<8.0%: CPT | Mod: CPTII,S$GLB,, | Performed by: NURSE PRACTITIONER

## 2024-05-07 PROCEDURE — 99999 PR PBB SHADOW E&M-EST. PATIENT-LVL V: CPT | Mod: PBBFAC,,, | Performed by: NURSE PRACTITIONER

## 2024-05-07 PROCEDURE — 3078F DIAST BP <80 MM HG: CPT | Mod: CPTII,S$GLB,, | Performed by: NURSE PRACTITIONER

## 2024-05-07 PROCEDURE — 3074F SYST BP LT 130 MM HG: CPT | Mod: CPTII,S$GLB,, | Performed by: NURSE PRACTITIONER

## 2024-05-07 PROCEDURE — 99214 OFFICE O/P EST MOD 30 MIN: CPT | Mod: S$GLB,,, | Performed by: NURSE PRACTITIONER

## 2024-05-07 PROCEDURE — 1160F RVW MEDS BY RX/DR IN RCRD: CPT | Mod: CPTII,S$GLB,, | Performed by: NURSE PRACTITIONER

## 2024-05-07 PROCEDURE — 3008F BODY MASS INDEX DOCD: CPT | Mod: CPTII,S$GLB,, | Performed by: NURSE PRACTITIONER

## 2024-05-07 RX ORDER — INSULIN ASPART INJECTION 100 [IU]/ML
INJECTION, SOLUTION SUBCUTANEOUS
Qty: 30 ML | Refills: 11 | Status: SHIPPED | OUTPATIENT
Start: 2024-05-07 | End: 2024-06-18 | Stop reason: SDUPTHER

## 2024-05-07 RX ORDER — INSULIN GLARGINE 300 [IU]/ML
40 INJECTION, SOLUTION SUBCUTANEOUS NIGHTLY
Start: 2024-05-07 | End: 2024-06-19 | Stop reason: SDUPTHER

## 2024-05-07 RX ORDER — SEMAGLUTIDE 0.68 MG/ML
0.5 INJECTION, SOLUTION SUBCUTANEOUS
Qty: 1 EACH | Refills: 6 | Status: SHIPPED | OUTPATIENT
Start: 2024-05-07 | End: 2024-06-18

## 2024-05-07 RX ORDER — INSULIN PMP CART,AUT,G6/7,CNTR
1 EACH SUBCUTANEOUS
Qty: 3 EACH | Refills: 11 | Status: SHIPPED | OUTPATIENT
Start: 2024-05-07 | End: 2024-06-18 | Stop reason: SDUPTHER

## 2024-05-07 RX ORDER — INSULIN ASPART INJECTION 100 [IU]/ML
INJECTION, SOLUTION SUBCUTANEOUS
Start: 2024-05-07 | End: 2024-06-19 | Stop reason: SDUPTHER

## 2024-05-07 NOTE — PROGRESS NOTES
"  CC:   Chief Complaint   Patient presents with    Diabetes Mellitus       HPI: Leona Tyler is a 43 y.o. female presents for a follow up visit today for the management of T1DM     She was diagnosed with Type 1 diabetes at 9 years old. She did use a Medtronic pump shortly after but stopped d/t poor glucose control and frequent severe hypoglycemia   Moved to [place] in late 2022-- was in [place]       Family hx of diabetes: mother (T2DM)  and father (T1)   Hospitalized for diabetes: Yes - for DKA several times; last episode was ~ 2008 while on insulin pump.  Insulin therapy: att he time of diagnosis     No personal or FH of thyroid cancer or personal of pancreatic cancer or pancreatitis.    Was started on Afreeza in [place]= she feels like since she started using the Afreeza- she has issues with her throat- constant coughing - tickle in the throat       -- federal     Almost 6 year old son   4 year old twins - boys       Our last visit was in February 2024 -- it was a virtual visit   Since then she met with education and started the Omnipod 5  in March 2024   See attached download readings are above goal   Accurate CHO couting   Frustrated with weigh t                      DIABETES COMPLICATIONS: none      Diabetes Management Status    ASA:  No    Statin: Not taking  ACE/ARB: Not taking    Screening or Prevention Patient's value Goal Complete/Controlled?   HgA1C Testing and Control   Lab Results   Component Value Date    HGBA1C 7.8 (H) 01/03/2024      Annually/Less than 8% No   Lipid profile : 10/31/2023 Annually No   LDL control Lab Results   Component Value Date    LDLCALC 103.8 10/31/2023    Annually/Less than 100 mg/dl  No   Nephropathy screening Lab Results   Component Value Date    LABMICR <5.0 10/31/2023     No results found for: "PROTEINUA" Annually No   Blood pressure BP Readings from Last 1 Encounters:   05/07/24 120/70    Less than 140/90 Yes   Dilated retinal exam Most Recent Eye Exam " Date: Not Found Annually Yes   Foot exam   : 2023 Annually No       CURRENT A1C:    Hemoglobin A1C   Date Value Ref Range Status   2024 7.8 (H) 4.0 - 5.6 % Final     Comment:     ADA Screening Guidelines:  5.7-6.4%  Consistent with prediabetes  >or=6.5%  Consistent with diabetes    High levels of fetal hemoglobin interfere with the HbA1C  assay. Heterozygous hemoglobin variants (HbS, HgC, etc)do  not significantly interfere with this assay.   However, presence of multiple variants may affect accuracy.     10/31/2023 7.5 (H) 4.0 - 5.6 % Final     Comment:     ADA Screening Guidelines:  5.7-6.4%  Consistent with prediabetes  >or=6.5%  Consistent with diabetes    High levels of fetal hemoglobin interfere with the HbA1C  assay. Heterozygous hemoglobin variants (HbS, HgC, etc)do  not significantly interfere with this assay.   However, presence of multiple variants may affect accuracy.         GOAL A1C: 6.5% without hypoglycemia       DM MEDICATIONS USED IN THE PAST: Basaglar   Fiasp   Afreeza -sore throat   Xultophy   Dexcom G6   Karina 2   Levemir   Lantus   Novolog   Humalog   Regular   NPH   Toujeo   Lyumjev   Omnipod 5     CURRENT DIABETES MEDICATIONS:       Insulin Pump: Omnipod 5 with Fiasp insulin   Pump settings:  Basal: 1.3 units/hr -   The setting I put in was 1.25 units/hr     ICR: 1:8  ISF; 1:30   Target: 120  IOB: 3 hours       Pump site change: q 2 days   Cartridge change: q 2 days  Insulin TDD: 77.1 units     Basal 64%   Bolus 36%   CHO intake: 186 grams   Using the bolus wizard:yes   Overridin%     Back up Lantus/Levemir: Toujeo      Patient's pump was downloaded in clinic today and reviewed with patient.   Please see attached documents for pump download.       Insulin:  pens.    Missed doses: pump       Supplies- pharmacy       BLOOD GLUCOSE MONITORING:    Sensor type: Dexcom G6   Average BG readin-209   Time in range: 39%  28% high   31% very high   1% low   <1% very low   Estimated  A1c is 8.4%    Site change:q10 days    2 weeks prior - BG was  214  33% in range   29% high   35% very high   2% low   1% very low   8.4% estimated A1c       Supplies: DMS       Sensor was downloaded in clinic today and reviewed with patient.   Please see attached document for download.         HYPOGLYCEMIA:  Yes 1% low   <1% very low   2 weeks prior --2% low -- 1% very low   Glucagon kit:Baqsimi   Medic alert bracelet: denies         MEALS: eating 3 meals per day   Feels confident with CHO counting   Drinks: water and coffee and green tea          CURRENT EXERCISE:  Yes-- started recently       Review of Systems  Review of Systems   Constitutional:  Positive for fatigue and unexpected weight change (weight gain). Negative for appetite change.   HENT:  Negative for trouble swallowing and voice change.    Eyes:  Negative for visual disturbance.   Respiratory:  Negative for shortness of breath.    Cardiovascular:  Negative for chest pain.   Gastrointestinal:  Negative for nausea.   Endocrine: Negative for polydipsia, polyphagia and polyuria.   Genitourinary:         No Nocturia    Skin:  Negative for wound.   Neurological:  Negative for numbness.       Physical Exam   Physical Exam  Vitals and nursing note reviewed.   Constitutional:       General: She is not in acute distress.     Appearance: She is well-developed. She is obese. She is not ill-appearing.   HENT:      Head: Normocephalic and atraumatic.      Right Ear: External ear normal.      Left Ear: External ear normal.      Nose: Nose normal.   Neck:      Thyroid: No thyromegaly.      Trachea: No tracheal deviation.   Cardiovascular:      Rate and Rhythm: Normal rate and regular rhythm.      Heart sounds: No murmur heard.  Pulmonary:      Effort: Pulmonary effort is normal. No respiratory distress.      Breath sounds: Normal breath sounds.   Abdominal:      Palpations: Abdomen is soft.      Tenderness: There is no abdominal tenderness.      Hernia: No hernia is  present.   Musculoskeletal:      Cervical back: Normal range of motion and neck supple.   Skin:     General: Skin is warm and dry.      Capillary Refill: Capillary refill takes less than 2 seconds.      Findings: No rash.      Comments: Injection sites are normal appearing. No lipo hypertropthy or atrophy     Neurological:      General: No focal deficit present.      Mental Status: She is alert and oriented to person, place, and time.      Cranial Nerves: No cranial nerve deficit.   Psychiatric:         Mood and Affect: Mood normal.         Behavior: Behavior normal.         Thought Content: Thought content normal.         Judgment: Judgment normal.         FOOT EXAMINATION: Appropriate footwear         Lab Results   Component Value Date    TSH 1.030 10/31/2023           Type 1 diabetes mellitus with hyperglycemia  Uncontrolled   She is now on Omnipod 5 with Dexcom G6   She is back on Fiasp -- but still feels like it doesn't work well.   We discussed changing to Humalog or Novolog but she wants to wait   Feels confident with her carbohydrate counting  She is frustrated with weight --- wants to try Ozempic   Denies hx of pancreatitis       -- Medication Changes:     If you start the ozempic   Ozempic 0.25 mg weekly for 4 weeks & then 0.5 mg weekly thereafter.   Please notify me for any abdominal pain, nausea, vomiting, diarrhea.     Continue the Omnipod 5 insulin pump     Basal:   MN- 6AM: 1.35 units/hr   6AM- MN: 1.75 units/hr     ICR: 1:6-- tightened     ISF:   MN- 6AM: 1:35-- cut back   6AM-MN: 1:25-- tightened     Target: 120  IOB: 3 hours           -- Reviewed goals of therapy are to get the best control we can without hypoglycemia.  -- Reviewed patient's current insulin regimen. Clarified proper insulin dose and timing in relation to meals, etc. Insulin injection sites and proper rotation instructed.   -- Advised frequent self blood glucose monitoring.  Patient encouraged to document glucose results and bring  them to every clinic visit.-- continue dexcom G6 personal CGM- supplies from [employer]   -- Hypoglycemia precautions discussed. Instructed on precautions before driving.    -- Call for Bg repeatedly < 70 or > 200.   -- Close adherence to lifestyle changes recommended.   -- Periodic follow ups for eye evaluations, foot care and dental care suggested.         Patient has diabetes mellitus and manages diabetes with intensive insulin regimen and uses prandial and basal insulin daily  Patient requires a therapeutic CGM and is willing to use therapeutic CGM for the necessary frequent adjustments of insulin therapy.  I have completed an in-person visit during the previous 6 months and will continue to have in-person visits every 6 months to assess adherence to their CGM regimen and diabetes treatment plan.  Due to COVID pandemic and need for remote monitoring this tool is medically necessary          Type 1 diabetes mellitus with hypoglycemia  Reviewed hypoglycemia management: Treat with 1/2 glass of juice, 1/2 can regular coke, or 4 glucose tablets.   Monitor and repeat treatment every 15 minutes until BG is >70   Then have a snack, which includes a complex carbohydrate and protein.    Has Baqsimi   Recommend medic alert bracelet     Class 1 obesity due to excess calories with serious comorbidity and body mass index (BMI) of 30.0 to 30.9 in adult  Body mass index is 30.78 kg/m².  Increases insulin resistance.   Discussed DM diet and exercise.       Insulin pump in place  See above     Insulin pump fitting or adjustment  See above     PCOS (polycystic ovarian syndrome)  Increases insulin resistance         I spent a total of 30 minutes on the day of the visit.This includes face to face time and non-face to face time preparing to see the patient (eg, review of tests), obtaining and/or reviewing separately obtained history, documenting clinical information in the electronic or other health record, independently interpreting  results and communicating results to the patient/family/caregiver, or care coordinator.          Pump backup plan    If the insulin pump is non functional and discontinued for anticipated more than 20 hours, please give daily injections of:   Long acting insulin Toujeo 40 units daily   Short acting insulin Fiasp  for meals according to carb ratios and sensitivity factor in the pump.     When the insulin pump is restarted, do not restart basal rates until at least 22 hours after the last long acting insulin injection. You can set a 0% temporary basal setting that will last until this time and use your pump to bolus for meals and correction.     For any technical insulin pump issues, please contact the insulin pump company; the toll free number is printed on the label on the back of the insulin pump.       If your sugar is running high for a few hours and does not respond to two correction doses from the insulin pump, it may mean that you have a bad pump site and the site should be changed         Follow up in about 6 weeks (around 6/18/2024).  Follow up with me in 6 weeks with labs prior         Orders Placed This Encounter   Procedures    TSH     Standing Status:   Future     Standing Expiration Date:   11/7/2025       Recommendations were discussed with the patient in detail  The patient verbalized understanding and agrees with the plan outlined as above.     This note was partly generated with [employer] voice recognition software. I apologize for any possible typographical errors.

## 2024-05-07 NOTE — ASSESSMENT & PLAN NOTE
Uncontrolled   She is now on Omnipod 5 with Dexcom G6   She is back on Fiasp -- but still feels like it doesn't work well.   We discussed changing to Humalog or Novolog but she wants to wait   Feels confident with her carbohydrate counting  She is frustrated with weight --- wants to try Ozempic   Denies hx of pancreatitis       -- Medication Changes:     If you start the ozempic   Ozempic 0.25 mg weekly for 4 weeks & then 0.5 mg weekly thereafter.   Please notify me for any abdominal pain, nausea, vomiting, diarrhea.     Continue the Omnipod 5 insulin pump     Basal:   MN- 6AM: 1.35 units/hr   6AM- MN: 1.75 units/hr     ICR: 1:6-- tightened     ISF:   MN- 6AM: 1:35-- cut back   6AM-MN: 1:25-- tightened     Target: 120  IOB: 3 hours           -- Reviewed goals of therapy are to get the best control we can without hypoglycemia.  -- Reviewed patient's current insulin regimen. Clarified proper insulin dose and timing in relation to meals, etc. Insulin injection sites and proper rotation instructed.   -- Advised frequent self blood glucose monitoring.  Patient encouraged to document glucose results and bring them to every clinic visit.-- continue dexcom G6 personal CGM- supplies from [employer]   -- Hypoglycemia precautions discussed. Instructed on precautions before driving.    -- Call for Bg repeatedly < 70 or > 200.   -- Close adherence to lifestyle changes recommended.   -- Periodic follow ups for eye evaluations, foot care and dental care suggested.         Patient has diabetes mellitus and manages diabetes with intensive insulin regimen and uses prandial and basal insulin daily  Patient requires a therapeutic CGM and is willing to use therapeutic CGM for the necessary frequent adjustments of insulin therapy.  I have completed an in-person visit during the previous 6 months and will continue to have in-person visits every 6 months to assess adherence to their CGM regimen and diabetes treatment plan.  Due to COVID pandemic  and need for remote monitoring this tool is medically necessary

## 2024-05-07 NOTE — ASSESSMENT & PLAN NOTE
Body mass index is 30.78 kg/m².  Increases insulin resistance.   Discussed DM diet and exercise.

## 2024-05-24 ENCOUNTER — OFFICE VISIT (OUTPATIENT)
Dept: OTOLARYNGOLOGY | Facility: CLINIC | Age: 44
End: 2024-05-24
Payer: COMMERCIAL

## 2024-05-24 VITALS
SYSTOLIC BLOOD PRESSURE: 116 MMHG | BODY MASS INDEX: 30.72 KG/M2 | HEIGHT: 67 IN | WEIGHT: 195.75 LBS | DIASTOLIC BLOOD PRESSURE: 79 MMHG | HEART RATE: 84 BPM

## 2024-05-24 DIAGNOSIS — J32.9 RECURRENT SINUSITIS: Primary | ICD-10-CM

## 2024-05-24 PROCEDURE — 1160F RVW MEDS BY RX/DR IN RCRD: CPT | Mod: CPTII,S$GLB,, | Performed by: PHYSICIAN ASSISTANT

## 2024-05-24 PROCEDURE — 87075 CULTR BACTERIA EXCEPT BLOOD: CPT | Performed by: PHYSICIAN ASSISTANT

## 2024-05-24 PROCEDURE — 99999 PR PBB SHADOW E&M-EST. PATIENT-LVL IV: CPT | Mod: PBBFAC,,, | Performed by: PHYSICIAN ASSISTANT

## 2024-05-24 PROCEDURE — 3078F DIAST BP <80 MM HG: CPT | Mod: CPTII,S$GLB,, | Performed by: PHYSICIAN ASSISTANT

## 2024-05-24 PROCEDURE — 3051F HG A1C>EQUAL 7.0%<8.0%: CPT | Mod: CPTII,S$GLB,, | Performed by: PHYSICIAN ASSISTANT

## 2024-05-24 PROCEDURE — 3074F SYST BP LT 130 MM HG: CPT | Mod: CPTII,S$GLB,, | Performed by: PHYSICIAN ASSISTANT

## 2024-05-24 PROCEDURE — 99204 OFFICE O/P NEW MOD 45 MIN: CPT | Mod: 25,S$GLB,, | Performed by: PHYSICIAN ASSISTANT

## 2024-05-24 PROCEDURE — 92511 NASOPHARYNGOSCOPY: CPT | Mod: S$GLB,,, | Performed by: PHYSICIAN ASSISTANT

## 2024-05-24 PROCEDURE — 87070 CULTURE OTHR SPECIMN AEROBIC: CPT | Performed by: PHYSICIAN ASSISTANT

## 2024-05-24 PROCEDURE — 1159F MED LIST DOCD IN RCRD: CPT | Mod: CPTII,S$GLB,, | Performed by: PHYSICIAN ASSISTANT

## 2024-05-24 PROCEDURE — 3008F BODY MASS INDEX DOCD: CPT | Mod: CPTII,S$GLB,, | Performed by: PHYSICIAN ASSISTANT

## 2024-05-24 RX ORDER — AZELASTINE 1 MG/ML
1 SPRAY, METERED NASAL 2 TIMES DAILY
Qty: 30 ML | Refills: 0 | Status: SHIPPED | OUTPATIENT
Start: 2024-05-24 | End: 2025-05-24

## 2024-05-24 RX ORDER — NAPROXEN 500 MG/1
500 TABLET ORAL 2 TIMES DAILY
Qty: 14 TABLET | Refills: 0 | Status: SHIPPED | OUTPATIENT
Start: 2024-05-24

## 2024-05-24 RX ORDER — DOXYCYCLINE 100 MG/1
100 CAPSULE ORAL 2 TIMES DAILY
Qty: 28 CAPSULE | Refills: 0 | Status: SHIPPED | OUTPATIENT
Start: 2024-05-24 | End: 2024-06-07

## 2024-05-24 NOTE — PROGRESS NOTES
No chief complaint on file.      43 y.o. female presents with recurrent sinus infection.  She has been dealing with this for years.  She has tried flonase, allergy medications (zyrtec, claritin), sinus rinses, mucinex, flonase spray with no relief.  She has a history of recurrent infections requiring antibiotics and steroid injections - most recent abx prescription was 2 months ago.  Last year - had at least 4 sinus infections requiring abx. She did have allergy testing in October -  it was normal and there was nothing she was allergic to.   Currently - reports sinus pressure, post nasal drip, sore throat, sinus headache, green mucus, chest congestion. She is currently taking mucinex, doing sinus rinses 3 times a week. No known sick contacts.     Review of Systems     Constitutional: +fatigue  HENT: +sinus pressure, PND, congestion  Eyes: Negative for visual disturbance.   Respiratory: Negative for shortness of breath, hemoptysis  Cardiovascular: Negative for chest pain and palpitations.   Musculoskeletal: +body aches  Skin: Negative for rash.  Neurological: Negative for dizziness.  Hematological: Does not bruise/bleed easily.   Psychiatric/Behavioral: Negative for agitation. The patient is not nervous/anxious.   Endocrine: Negative for rapid weight loss/weight gain, heat/cold intolerance.     Past Medical History:   Diagnosis Date    Diabetes mellitus type I        Past Surgical History:   Procedure Laterality Date    CARPAL TUNNEL RELEASE Right      SECTION      MYOMECTOMY         family history includes Breast cancer in her maternal aunt; Diabetes in her father, maternal grandmother, mother, and paternal grandmother; Ovarian cancer in her paternal aunt.    Pt  reports that she has never smoked. She has never used smokeless tobacco. She reports current alcohol use of about 2.0 standard drinks of alcohol per week. She reports that she does not use drugs.    Review of patient's allergies indicates:  No  Known Allergies     Physical Exam    Vitals:    05/24/24 1425   BP: 116/79   Pulse: 84     Body mass index is 30.66 kg/m².    General: AOx3, NAD  Right Ear: External Auditory Canal WNL,TM w/o masses/lesions/perforations  Left Ear:  External Auditory Canal WNL,TM w/o masses/lesions/perforations  Nose: No gross nasal septal deviation.  Moderate amount of thick mucus noted bilaterally. Culture obtained.   Oral Cavity: FOM Soft, no masses palpated.  Oral Tongue mobile.  Hard Palate WNL.  Oropharynx: BOT WNL.  No masses/lesions noted.  Tonsillar fossa without lesions.  Soft palate without masses.  Midline uvula.  Neck: No palpable lymphadenopathy at I - VI.    Face: House Brackmann I bilaterally.  Eyes: Normal extra ocular motion bilaterally.    Procedure: Flexible nasopharyngoscopy  After explaining the procedure and obtaining verbal consent, a timeout was performed with the patient's participation according to the universal protocol. Both nasal cavities were anesthetized with 4% Xylocaine spray mixed with Kulwinder-Synephrine. The flexible laryngoscope was inserted into the nasal cavity and advanced to visualize the nasal cavity and nasopharynxThe scope was removed and the procedure terminated. The patient tolerated this procedure well without apparent complication.     Assessment     1. Recurrent sinusitis          Plan    Problem List Items Addressed This Visit    None  Visit Diagnoses       Recurrent sinusitis    -  Primary    Relevant Medications    doxycycline (VIBRAMYCIN) 100 MG Cap    Other Relevant Orders    CT Medtronic Sinuses without    CULTURE, ANAEROBE    CULTURE, AEROBIC  (SPECIFY SOURCE)          Multiple episodes of sinusitis requiring abx - most recently 2 months ago. Culture obtained today. Will give doxycyline for 2 weeks. CT sinuses ordered. Also given rx for naproxen for headaches and astelin spray. Will arrange follow up with Dr Dudley in 3 weeks.  Discussed with Dr Dudley.

## 2024-05-27 LAB — BACTERIA SPEC AEROBE CULT: NO GROWTH

## 2024-05-28 LAB — BACTERIA SPEC ANAEROBE CULT: NORMAL

## 2024-06-07 ENCOUNTER — TELEPHONE (OUTPATIENT)
Dept: DIABETES | Facility: CLINIC | Age: 44
End: 2024-06-07
Payer: COMMERCIAL

## 2024-06-14 ENCOUNTER — PATIENT MESSAGE (OUTPATIENT)
Dept: DIABETES | Facility: CLINIC | Age: 44
End: 2024-06-14
Payer: COMMERCIAL

## 2024-06-14 DIAGNOSIS — E10.65 TYPE 1 DIABETES MELLITUS WITH HYPERGLYCEMIA: ICD-10-CM

## 2024-06-17 ENCOUNTER — TELEPHONE (OUTPATIENT)
Dept: DIABETES | Facility: CLINIC | Age: 44
End: 2024-06-17
Payer: COMMERCIAL

## 2024-06-18 ENCOUNTER — OFFICE VISIT (OUTPATIENT)
Dept: DIABETES | Facility: CLINIC | Age: 44
End: 2024-06-18
Payer: COMMERCIAL

## 2024-06-18 ENCOUNTER — TELEPHONE (OUTPATIENT)
Dept: DIABETES | Facility: CLINIC | Age: 44
End: 2024-06-18
Payer: COMMERCIAL

## 2024-06-18 VITALS
DIASTOLIC BLOOD PRESSURE: 81 MMHG | OXYGEN SATURATION: 98 % | HEIGHT: 67 IN | BODY MASS INDEX: 30.89 KG/M2 | SYSTOLIC BLOOD PRESSURE: 122 MMHG | HEART RATE: 89 BPM | WEIGHT: 196.81 LBS

## 2024-06-18 DIAGNOSIS — E66.09 CLASS 1 OBESITY DUE TO EXCESS CALORIES WITH SERIOUS COMORBIDITY AND BODY MASS INDEX (BMI) OF 30.0 TO 30.9 IN ADULT: ICD-10-CM

## 2024-06-18 DIAGNOSIS — E28.2 PCOS (POLYCYSTIC OVARIAN SYNDROME): ICD-10-CM

## 2024-06-18 DIAGNOSIS — Z46.81 INSULIN PUMP FITTING OR ADJUSTMENT: ICD-10-CM

## 2024-06-18 DIAGNOSIS — E10.65 TYPE 1 DIABETES MELLITUS WITH HYPERGLYCEMIA: Primary | ICD-10-CM

## 2024-06-18 DIAGNOSIS — Z96.41 INSULIN PUMP IN PLACE: ICD-10-CM

## 2024-06-18 DIAGNOSIS — E88.819 INSULIN RESISTANCE: ICD-10-CM

## 2024-06-18 PROCEDURE — 95251 CONT GLUC MNTR ANALYSIS I&R: CPT | Mod: S$GLB,,, | Performed by: NURSE PRACTITIONER

## 2024-06-18 PROCEDURE — 99214 OFFICE O/P EST MOD 30 MIN: CPT | Mod: S$GLB,,, | Performed by: NURSE PRACTITIONER

## 2024-06-18 PROCEDURE — 3074F SYST BP LT 130 MM HG: CPT | Mod: CPTII,S$GLB,, | Performed by: NURSE PRACTITIONER

## 2024-06-18 PROCEDURE — 1160F RVW MEDS BY RX/DR IN RCRD: CPT | Mod: CPTII,S$GLB,, | Performed by: NURSE PRACTITIONER

## 2024-06-18 PROCEDURE — 99999 PR PBB SHADOW E&M-EST. PATIENT-LVL V: CPT | Mod: PBBFAC,,, | Performed by: NURSE PRACTITIONER

## 2024-06-18 PROCEDURE — 1159F MED LIST DOCD IN RCRD: CPT | Mod: CPTII,S$GLB,, | Performed by: NURSE PRACTITIONER

## 2024-06-18 PROCEDURE — 3079F DIAST BP 80-89 MM HG: CPT | Mod: CPTII,S$GLB,, | Performed by: NURSE PRACTITIONER

## 2024-06-18 PROCEDURE — 3051F HG A1C>EQUAL 7.0%<8.0%: CPT | Mod: CPTII,S$GLB,, | Performed by: NURSE PRACTITIONER

## 2024-06-18 PROCEDURE — 3008F BODY MASS INDEX DOCD: CPT | Mod: CPTII,S$GLB,, | Performed by: NURSE PRACTITIONER

## 2024-06-18 RX ORDER — INSULIN PMP CART,AUT,G6/7,CNTR
1 EACH SUBCUTANEOUS
Qty: 3 EACH | Refills: 11 | Status: SHIPPED | OUTPATIENT
Start: 2024-06-18

## 2024-06-18 RX ORDER — SEMAGLUTIDE 1.34 MG/ML
1 INJECTION, SOLUTION SUBCUTANEOUS
Qty: 1 EACH | Refills: 6 | Status: SHIPPED | OUTPATIENT
Start: 2024-06-18 | End: 2025-06-18

## 2024-06-18 RX ORDER — INSULIN ASPART INJECTION 100 [IU]/ML
INJECTION, SOLUTION SUBCUTANEOUS
Qty: 30 ML | Refills: 11 | Status: SHIPPED | OUTPATIENT
Start: 2024-06-18 | End: 2024-06-19

## 2024-06-18 NOTE — ASSESSMENT & PLAN NOTE
Uncontrolled   She is now on Omnipod 5 with Dexcom G6   Using Fiasp -- wants to stay on Fiasp for now   Feels confident with her carbohydrate counting  She is frustrated with weight on ozempic now   Denies hx of pancreatitis   Has significant insulin resistance   May consider adding in metformin extended release with RTC  Hesitant to use S GLT2 since she has had DKA multiple times in the past---she may be ketone prone        -- Medication Changes:     Increase Ozempic to 1 mg weekly       Continue the Omnipod 5 insulin pump   Discouraged her from entering in fair carbohydrates  Would rather her use the correction factor to we can figure out if we need to tighten that more---encouraged her to hit the use sensor button when her sugars are running high    Basal:   MN- 6AM: 1.35 units/hr   6AM- MN: 1.75 units/hr     ICR: 1:5-- tightened     ISF:   MN- 6AM: 1:35--    6AM-MN: 1:25-    Target: 110--tighten  IOB: 2.5 hours -tighten          -- Reviewed goals of therapy are to get the best control we can without hypoglycemia.  -- Reviewed patient's current insulin regimen. Clarified proper insulin dose and timing in relation to meals, etc. Insulin injection sites and proper rotation instructed.   -- Advised frequent self blood glucose monitoring.  Patient encouraged to document glucose results and bring them to every clinic visit.-- continue dexcom G6 personal CGM- supplies from [facility]   -- Hypoglycemia precautions discussed. Instructed on precautions before driving.    -- Call for Bg repeatedly < 70 or > 200.   -- Close adherence to lifestyle changes recommended.   -- Periodic follow ups for eye evaluations, foot care and dental care suggested.         Patient has diabetes mellitus and manages diabetes with intensive insulin regimen and uses prandial and basal insulin daily  Patient requires a therapeutic CGM and is willing to use therapeutic CGM for the necessary frequent adjustments of insulin therapy.  I have completed an  in-person visit during the previous 6 months and will continue to have in-person visits every 6 months to assess adherence to their CGM regimen and diabetes treatment plan.  Due to COVID pandemic and need for remote monitoring this tool is medically necessary

## 2024-06-18 NOTE — ASSESSMENT & PLAN NOTE
Body mass index is 30.82 kg/m².  Increases insulin resistance.   Discussed DM diet and exercise.

## 2024-06-18 NOTE — PROGRESS NOTES
CC:   Chief Complaint   Patient presents with    Diabetes Mellitus       HPI: Leona Tyler is a 43 y.o. female presents for a follow up visit today for the management of T1DM     She was diagnosed with Type 1 diabetes at 9 years old. She did use a [employer] pump shortly after but stopped d/t poor glucose control and frequent severe hypoglycemia   Moved to [place] in late 2022-- was in [place]       Family hx of diabetes: mother (T2DM)  and father (T1)   Hospitalized for diabetes: Yes - for DKA several times; last episode was ~ 2008 while on insulin pump.  Insulin therapy: att he time of diagnosis     No personal or FH of thyroid cancer or personal of pancreatic cancer or pancreatitis.    Was started on Afreeza in [place]= she feels like since she started using the Afreeza- she has issues with her throat- constant coughing - tickle in the throat       -- federal     Almost 6 year old son   4 year old twins - boys       Our last visit was 6 weeks ago on May 7, 2024  At that visit she discussed that she felt like the Fiasp was not working and wanted to change back to Humalog or NovoLog-- she is now saying that the Fiasp is good and she wants to stick with it   She felt very confident with her carbohydrate counting  She wanted to try Ozempic--if insurance would cover  We continued her Omnipod 5 insulin pump  Adjusted her basal rates based on download  Tightened her carbohydrate ratio  Cut back on her sensitivity factor from midnight to 6:00 a.m. but tighten the sensitivity factor from 6:00 a.m. to midnight  Her blood sugars have remained uncontrolled  See attached downloads   Recent A1c of 7.7%   Insurance is covering the ozempic -- denies GI upset -- feels good on 0.5 mg weekly for 1 month -- ready to increase   She is suffering with recurrent sinusitits -- on multiple antibiotics   She left her omnipod  at home today- she feels comfortable making changes        "                            DIABETES COMPLICATIONS: none      Diabetes Management Status    ASA:  No    Statin: Not taking  ACE/ARB: Not taking    Screening or Prevention Patient's value Goal Complete/Controlled?   HgA1C Testing and Control   Lab Results   Component Value Date    HGBA1C 7.7 (H) 06/14/2024      Annually/Less than 8% No   Lipid profile : 10/31/2023 Annually No   LDL control Lab Results   Component Value Date    LDLCALC 103.8 10/31/2023    Annually/Less than 100 mg/dl  No   Nephropathy screening Lab Results   Component Value Date    LABMICR <5.0 10/31/2023     No results found for: "PROTEINUA" Annually No   Blood pressure BP Readings from Last 1 Encounters:   06/18/24 122/81    Less than 140/90 Yes   Dilated retinal exam Most Recent Eye Exam Date: Not Found Annually Yes   Foot exam   : 09/26/2023 Annually No       CURRENT A1C:    Hemoglobin A1C   Date Value Ref Range Status   06/14/2024 7.7 (H) 4.0 - 5.6 % Final     Comment:     ADA Screening Guidelines:  5.7-6.4%  Consistent with prediabetes  >or=6.5%  Consistent with diabetes    High levels of fetal hemoglobin interfere with the HbA1C  assay. Heterozygous hemoglobin variants (HbS, HgC, etc)do  not significantly interfere with this assay.   However, presence of multiple variants may affect accuracy.     01/03/2024 7.8 (H) 4.0 - 5.6 % Final     Comment:     ADA Screening Guidelines:  5.7-6.4%  Consistent with prediabetes  >or=6.5%  Consistent with diabetes    High levels of fetal hemoglobin interfere with the HbA1C  assay. Heterozygous hemoglobin variants (HbS, HgC, etc)do  not significantly interfere with this assay.   However, presence of multiple variants may affect accuracy.     10/31/2023 7.5 (H) 4.0 - 5.6 % Final     Comment:     ADA Screening Guidelines:  5.7-6.4%  Consistent with prediabetes  >or=6.5%  Consistent with diabetes    High levels of fetal hemoglobin interfere with the HbA1C  assay. Heterozygous hemoglobin variants (HbS, HgC, " etc)do  not significantly interfere with this assay.   However, presence of multiple variants may affect accuracy.         GOAL A1C: 6.5% without hypoglycemia       DM MEDICATIONS USED IN THE PAST: Basaglar   Fiasp   Afreeza -sore throat   Xultophy   Dexcom G6   Karina 2   Levemir   Lantus   Novolog   Humalog   Regular   NPH   Marly Collins   Omnipod 5   Ozempic   Metformin       CURRENT DIABETES MEDICATIONS: ozempic 0.5 mg weekly -- on this doses for about 1 month       Insulin Pump: Omnipod 5 with Fiasp insulin   Pump settings:    Basal:   MN- 6AM: 1.35 units/hr   6AM- MN: 1.75 units/hr      ICR: 1:6-- tightened      ISF:   MN- 6AM: 1:35-- cut back   6AM-MN: 1:25-- tightened      Target: 120  IOB: 3 hours        Pump site change: q 2 days   Cartridge change: q 2 days  Insulin TDD: 97.9 units     Basal 51%   Bolus 49%   CHO intake: 248.7 grams -- reports that she is entering in false CHO because sugars are high   Using the bolus wizard:yes   Overridin%   100% in auto mode     Back up Lantus/Levemir: Toujeo      Patient's pump was downloaded in clinic today and reviewed with patient.   Please see attached documents for pump download.       Insulin:  pens.    Missed doses: pump       Supplies- pharmacy       BLOOD GLUCOSE MONITORING:    Sensor type: Dexcom G6 --from 2024 through 2024---reviewed on Omnipod download  Average BG readin- on her omnipod download   Time in range:  42% %  37% high   21 very high   0% low   0% very low   Estimated A1c is na   Site change:q10 days      On her Dexcom download her 2 week average is 202 and she is in target range 43% of the time with an estimated A1c of 8.1%  Two weeks prior to that her average blood sugar was 194 in range 43% of the time with an estimated A1c of 8%        Supplies: [employer]     Sensor was downloaded in clinic today and reviewed with patient.   Please see attached document for download.         HYPOGLYCEMIA:  Yes 0% low    0 very low   Glucagon kit:Baqsimi   Medic alert bracelet: denies         MEALS: eating 3 meals per day   Feels confident with CHO counting   Drinks: water and coffee and green tea          CURRENT EXERCISE:  Yes-- started recently       Review of Systems  Review of Systems   Constitutional:  Positive for fatigue and unexpected weight change (weight gain). Negative for appetite change.   HENT:  Positive for sinus pressure and sinus pain. Negative for trouble swallowing and voice change.    Eyes:  Negative for visual disturbance.   Respiratory:  Negative for shortness of breath.    Cardiovascular:  Negative for chest pain.   Gastrointestinal:  Negative for nausea.   Endocrine: Negative for polydipsia, polyphagia and polyuria.   Genitourinary:         No Nocturia    Skin:  Negative for wound.   Neurological:  Negative for numbness.       Physical Exam   Physical Exam  Vitals and nursing note reviewed.   Constitutional:       General: She is not in acute distress.     Appearance: She is well-developed. She is obese. She is not ill-appearing.   HENT:      Head: Normocephalic and atraumatic.      Right Ear: External ear normal.      Left Ear: External ear normal.      Nose: Nose normal.   Neck:      Thyroid: No thyromegaly.      Trachea: No tracheal deviation.   Cardiovascular:      Rate and Rhythm: Normal rate and regular rhythm.      Heart sounds: No murmur heard.  Pulmonary:      Effort: Pulmonary effort is normal. No respiratory distress.      Breath sounds: Normal breath sounds.   Abdominal:      Palpations: Abdomen is soft.      Tenderness: There is no abdominal tenderness.      Hernia: No hernia is present.   Musculoskeletal:      Cervical back: Normal range of motion and neck supple.   Skin:     General: Skin is warm and dry.      Capillary Refill: Capillary refill takes less than 2 seconds.      Findings: No rash.      Comments: Injection sites are normal appearing. No lipo hypertropthy or atrophy      Neurological:      General: No focal deficit present.      Mental Status: She is alert and oriented to person, place, and time.      Cranial Nerves: No cranial nerve deficit.   Psychiatric:         Mood and Affect: Mood normal.         Behavior: Behavior normal.         Thought Content: Thought content normal.         Judgment: Judgment normal.         FOOT EXAMINATION: Appropriate footwear         Lab Results   Component Value Date    TSH 1.562 06/14/2024           Type 1 diabetes mellitus with hyperglycemia  Uncontrolled   She is now on Omnipod 5 with Dexcom G6   Using Fiasp -- wants to stay on Fiasp for now   Feels confident with her carbohydrate counting  She is frustrated with weight on ozempic now   Denies hx of pancreatitis   Has significant insulin resistance   May consider adding in metformin extended release with RTC  Hesitant to use S GLT2 since she has had DKA multiple times in the past---she may be ketone prone        -- Medication Changes:     Increase Ozempic to 1 mg weekly       Continue the Omnipod 5 insulin pump   Discouraged her from entering in fair carbohydrates  Would rather her use the correction factor to we can figure out if we need to tighten that more---encouraged her to hit the use sensor button when her sugars are running high    Basal:   MN- 6AM: 1.35 units/hr   6AM- MN: 1.75 units/hr     ICR: 1:5-- tightened     ISF:   MN- 6AM: 1:35--    6AM-MN: 1:25-    Target: 110--tighten  IOB: 2.5 hours -tighten          -- Reviewed goals of therapy are to get the best control we can without hypoglycemia.  -- Reviewed patient's current insulin regimen. Clarified proper insulin dose and timing in relation to meals, etc. Insulin injection sites and proper rotation instructed.   -- Advised frequent self blood glucose monitoring.  Patient encouraged to document glucose results and bring them to every clinic visit.-- continue dexcom G6 personal CGM- supplies from [facility]   -- Hypoglycemia precautions  discussed. Instructed on precautions before driving.    -- Call for Bg repeatedly < 70 or > 200.   -- Close adherence to lifestyle changes recommended.   -- Periodic follow ups for eye evaluations, foot care and dental care suggested.         Patient has diabetes mellitus and manages diabetes with intensive insulin regimen and uses prandial and basal insulin daily  Patient requires a therapeutic CGM and is willing to use therapeutic CGM for the necessary frequent adjustments of insulin therapy.  I have completed an in-person visit during the previous 6 months and will continue to have in-person visits every 6 months to assess adherence to their CGM regimen and diabetes treatment plan.  Due to COVID pandemic and need for remote monitoring this tool is medically necessary          Class 1 obesity due to excess calories with serious comorbidity and body mass index (BMI) of 30.0 to 30.9 in adult  Body mass index is 30.82 kg/m².  Increases insulin resistance.   Discussed DM diet and exercise.       Insulin pump fitting or adjustment  See above     Insulin pump in place  See above     PCOS (polycystic ovarian syndrome)  Increases insulin resistance           I spent a total of 30 minutes on the day of the visit.This includes face to face time and non-face to face time preparing to see the patient (eg, review of tests), obtaining and/or reviewing separately obtained history, documenting clinical information in the electronic or other health record, independently interpreting results and communicating results to the patient/family/caregiver, or care coordinator.          Pump backup plan    If the insulin pump is non functional and discontinued for anticipated more than 20 hours, please give daily injections of:   Long acting insulin Toujeo 40 units daily   Short acting insulin Fiasp  for meals according to carb ratios and sensitivity factor in the pump.     When the insulin pump is restarted, do not restart basal rates  until at least 22 hours after the last long acting insulin injection. You can set a 0% temporary basal setting that will last until this time and use your pump to bolus for meals and correction.     For any technical insulin pump issues, please contact the insulin pump company; the toll free number is printed on the label on the back of the insulin pump.       If your sugar is running high for a few hours and does not respond to two correction doses from the insulin pump, it may mean that you have a bad pump site and the site should be changed           Follow up in about 3 months (around 9/18/2024).  Follow up with me in 3 months with labs prior         Orders Placed This Encounter   Procedures    Hemoglobin A1C     Standing Status:   Future     Standing Expiration Date:   12/18/2025    Comprehensive Metabolic Panel     Standing Status:   Future     Standing Expiration Date:   12/18/2025    CBC Auto Differential     Standing Status:   Future     Standing Expiration Date:   12/18/2025    Fructosamine     Standing Status:   Future     Standing Expiration Date:   12/18/2025    Lipid Panel     Standing Status:   Future     Standing Expiration Date:   12/18/2025    Microalbumin/Creatinine Ratio, Urine     Standing Status:   Future     Standing Expiration Date:   12/18/2025     Order Specific Question:   Specimen Source     Answer:   Urine       Recommendations were discussed with the patient in detail  The patient verbalized understanding and agrees with the plan outlined as above.     This note was partly generated with [employer] voice recognition software. I apologize for any possible typographical errors.

## 2024-06-19 ENCOUNTER — OFFICE VISIT (OUTPATIENT)
Dept: ORTHOPEDICS | Facility: CLINIC | Age: 44
End: 2024-06-19
Payer: COMMERCIAL

## 2024-06-19 VITALS
HEART RATE: 83 BPM | BODY MASS INDEX: 30.86 KG/M2 | SYSTOLIC BLOOD PRESSURE: 108 MMHG | WEIGHT: 196.63 LBS | HEIGHT: 67 IN | DIASTOLIC BLOOD PRESSURE: 76 MMHG

## 2024-06-19 DIAGNOSIS — M65.331 TRIGGER FINGER, RIGHT MIDDLE FINGER: Primary | ICD-10-CM

## 2024-06-19 PROCEDURE — 3074F SYST BP LT 130 MM HG: CPT | Mod: CPTII,S$GLB,,

## 2024-06-19 PROCEDURE — 3051F HG A1C>EQUAL 7.0%<8.0%: CPT | Mod: CPTII,S$GLB,,

## 2024-06-19 PROCEDURE — 99999 PR PBB SHADOW E&M-EST. PATIENT-LVL IV: CPT | Mod: PBBFAC,,,

## 2024-06-19 PROCEDURE — 1160F RVW MEDS BY RX/DR IN RCRD: CPT | Mod: CPTII,S$GLB,,

## 2024-06-19 PROCEDURE — 99213 OFFICE O/P EST LOW 20 MIN: CPT | Mod: 25,S$GLB,,

## 2024-06-19 PROCEDURE — 3078F DIAST BP <80 MM HG: CPT | Mod: CPTII,S$GLB,,

## 2024-06-19 PROCEDURE — 1159F MED LIST DOCD IN RCRD: CPT | Mod: CPTII,S$GLB,,

## 2024-06-19 PROCEDURE — 3008F BODY MASS INDEX DOCD: CPT | Mod: CPTII,S$GLB,,

## 2024-06-19 PROCEDURE — 20550 NJX 1 TENDON SHEATH/LIGAMENT: CPT | Mod: RT,S$GLB,,

## 2024-06-19 RX ORDER — INSULIN ASPART INJECTION 100 [IU]/ML
INJECTION, SOLUTION SUBCUTANEOUS
Qty: 30 PEN | Refills: 3 | Status: SHIPPED | OUTPATIENT
Start: 2024-06-19

## 2024-06-19 RX ORDER — INSULIN ASPART INJECTION 100 [IU]/ML
INJECTION, SOLUTION SUBCUTANEOUS
Status: CANCELLED
Start: 2024-06-19

## 2024-06-19 RX ORDER — INSULIN ASPART INJECTION 100 [IU]/ML
INJECTION, SOLUTION SUBCUTANEOUS
Qty: 90 ML | Refills: 3 | Status: SHIPPED | OUTPATIENT
Start: 2024-06-19

## 2024-06-19 RX ORDER — INSULIN GLARGINE 300 [IU]/ML
40 INJECTION, SOLUTION SUBCUTANEOUS NIGHTLY
Qty: 12 PEN | Refills: 2 | Status: SHIPPED | OUTPATIENT
Start: 2024-06-19

## 2024-06-19 RX ORDER — DEXAMETHASONE SODIUM PHOSPHATE 4 MG/ML
4 INJECTION, SOLUTION INTRA-ARTICULAR; INTRALESIONAL; INTRAMUSCULAR; INTRAVENOUS; SOFT TISSUE
Status: COMPLETED | OUTPATIENT
Start: 2024-06-19 | End: 2024-06-19

## 2024-06-19 RX ORDER — INSULIN ASPART INJECTION 100 [IU]/ML
INJECTION, SOLUTION SUBCUTANEOUS
Qty: 90 ML | Refills: 3 | Status: SHIPPED | OUTPATIENT
Start: 2024-06-19 | End: 2024-06-19 | Stop reason: SDUPTHER

## 2024-06-19 RX ADMIN — DEXAMETHASONE SODIUM PHOSPHATE 4 MG: 4 INJECTION, SOLUTION INTRA-ARTICULAR; INTRALESIONAL; INTRAMUSCULAR; INTRAVENOUS; SOFT TISSUE at 03:06

## 2024-06-19 NOTE — PROGRESS NOTES
SUBJECTIVE:      Chief Complaint: right middle trigger finger    History of Present Illness:  Patient is a 43 y.o. right hand dominant female who presents today with complaints of right middle trigger finger.  Patient reports a history this her left hand as well as carpal tunnel syndrome.   Reports this started about 6 months ago and has progressively worsened.  No known MILEY.  Reports her middle finger intermittently locks and she has pain at the base of her finger.  She is a diabetic, last A1c unknown.  She is interested in an injection today.     The patient is a/an .    Onset of symptoms/DOI was 6 months prior.    Symptoms are aggravated by movement.    Symptoms are alleviated by rest.    Symptoms consist of locking and pain.    The patient rates their pain as a 4/10.    Attempted treatment(s) and/or interventions include activity modifications, rest, rest.     The patient denies any fevers, chills, N/V, D/C and presents for evaluation.    ____________________________________________________________________    Interval history 10/31/2023: Patient returns today for follow up of trigger finger. She reports pain in her left ring finger today. Previously had right long finger injected with significant relief. She has been wearing the oval 8 splint at night to her left ring finger. Does report her whole hand gets numb if she does not wear the splint at night. She would like to repeat injection today to her left hand.       ____________________________________________________________________    Interval history 6/19/2024 : Patient returns today for follow up of right middle finger. Had a trigger finger injection by myself 1 year ago. She feels this only helped about 25%. The left hand from October 2023 worked well.  She would like to trial right middle trigger finger injection today.         Past Medical History:   Diagnosis Date    Diabetes mellitus type I      Past Surgical History:   Procedure  Laterality Date    CARPAL TUNNEL RELEASE Right      SECTION      MYOMECTOMY       Review of patient's allergies indicates:  No Known Allergies  Social History     Social History Narrative    ** Merged History Encounter **          Family History   Problem Relation Name Age of Onset    Diabetes Mother          type 2    Diabetes Father          type 1    Breast cancer Maternal Aunt      Ovarian cancer Paternal Aunt      Diabetes Maternal Grandmother          type 2    Diabetes Paternal Grandmother          type 1         Current Outpatient Medications:     azelastine (ASTELIN) 137 mcg (0.1 %) nasal spray, 1 spray (137 mcg total) by Nasal route 2 (two) times daily., Disp: 30 mL, Rfl: 0    blood sugar diagnostic Strp, To check BG 4 times daily, to use with insurance preferred meter, Disp: 400 each, Rfl: 3    blood-glucose sensor (DEXCOM G6 SENSOR) Janny, Use as directed. Change sensor every 10 days., Disp: 3 each, Rfl: 11    blood-glucose transmitter (DEXCOM G6 TRANSMITTER) Janny, Use as directed. Change transmitter every 3 months., Disp: 1 each, Rfl: 3    insulin aspart, niacinamide, (FIASP FLEXTOUCH U-100 INSULIN) 100 unit/mL (3 mL) InPn, ICR 1:6 TID AC + correction scale. To have in case of insulin pump failure, Disp: , Rfl:     insulin aspart, niacinamide, (FIASP U-100 INSULIN) 100 unit/mL Soln, To use continuously with Omnipod 5. Max TDD of 100 units, Disp: 90 mL, Rfl: 3    insulin glargine, TOUJEO, (TOUJEO SOLOSTAR U-300 INSULIN) 300 unit/mL (1.5 mL) InPn pen, Inject 40 Units into the skin every evening. Titrate up until FBG is <130. Max TDD 60 units, Disp: , Rfl:     insulin pump cart,automated,BT (OMNIPOD 5 G6 PODS, GEN 5,) Crtg, Inject 1 Device into the skin every 48 hours., Disp: 3 each, Rfl: 11    lancets Misc, To check BG 4 times daily, to use with insurance preferred meter, Disp: 150 each, Rfl: 11    norethindrone-ethinyl estradiol-iron (JUNEL FE 1.5/30, 28,) 1.5 mg-30 mcg (21)/75 mg (7) tablet, Take  "1 tablet by mouth once daily., Disp: 90 tablet, Rfl: 4    norethindrone-ethinyl estradiol-iron (JUNEL FE 1.5/30, 28,) 1.5 mg-30 mcg (21)/75 mg (7) tablet, Take 1 tablet by mouth once daily., Disp: , Rfl:     ONETOUCH DELICA PLUS LANCET 33 gauge Misc, Apply topically., Disp: , Rfl:     pen needle, diabetic (BD ULTRA-FINE IZZY PEN NEEDLE) 32 gauge x 5/32" Ndle, 1 Device by Misc.(Non-Drug; Combo Route) route 6 (six) times daily., Disp: 550 each, Rfl: 3    semaglutide (OZEMPIC) 1 mg/dose (4 mg/3 mL), Inject 1 mg into the skin every 7 days., Disp: 1 each, Rfl: 6    valACYclovir (VALTREX) 1000 MG tablet, Take 1 tablet (1,000 mg total) by mouth once daily. Start after 7-day course, for suppression., Disp: 30 tablet, Rfl: 11    blood-glucose meter kit, To check BG 4 times daily, to use with insurance preferred meter, Disp: 1 each, Rfl: 0    glucagon (BAQSIMI) 3 mg/actuation Spry, Use as needed for severe hypoglycemia (Patient not taking: Reported on 6/18/2024), Disp: 2 each, Rfl: 1    LIDOcaine-prilocaine (EMLA) cream, Apply topically as needed. (Patient not taking: Reported on 5/24/2024), Disp: 5 g, Rfl: 0    montelukast (SINGULAIR) 10 mg tablet, montelukast 10 mg tablet  TAKE 1 TABLET BY MOUTH EVERY DAY AT BEDTIME (Patient not taking: Reported on 5/24/2024), Disp: , Rfl:     naproxen (NAPROSYN) 500 MG tablet, Take 1 tablet (500 mg total) by mouth 2 (two) times daily. (Patient not taking: Reported on 6/18/2024), Disp: 14 tablet, Rfl: 0    promethazine-dextromethorphan (PROMETHAZINE-DM) 6.25-15 mg/5 mL Syrp, Take 5 mLs by mouth every 6 (six) hours. (Patient not taking: Reported on 4/16/2024), Disp: , Rfl:   No current facility-administered medications for this visit.      Review of Systems:  Constitutional: no fever or chills  Eyes: no visual changes  ENT: no nasal congestion or sore throat  Respiratory: no cough or shortness of breath  Cardiovascular: no chest pain  Gastrointestinal: no nausea or vomiting, tolerating " "diet  Musculoskeletal: pain and soreness    OBJECTIVE:      Vital Signs (Most Recent):  Vitals:    06/19/24 1456   BP: 108/76   Pulse: 83   Weight: 89.2 kg (196 lb 10.4 oz)   Height: 5' 7" (1.702 m)     Body mass index is 30.8 kg/m².      Physical Exam:  Constitutional: The patient appears well-developed and well-nourished. No distress.   Skin: No lesions appreciated  Head: Normocephalic and atraumatic.   Nose: Nose normal.   Ears: No deformities seen  Eyes: Conjunctivae and EOM are normal.   Neck: No tracheal deviation present.   Cardiovascular: Normal rate and intact distal pulses.    Pulmonary/Chest: Effort normal. No respiratory distress.   Abdominal: There is no guarding.   Neurological: The patient is alert.   Psychiatric: The patient has a normal mood and affect.     Right Hand/Wrist Examination:    Observation/Inspection:  Swelling  none    Deformity  none  Discoloration  none     Scars   none    Atrophy  none    HAND/WRIST EXAMINATION:  Finkelstein's Test   Neg  WHAT Test    Neg  Snuff box tenderness   Neg  Scott's Test    Neg  Hook of Hamate Tenderness  Neg  CMC grind    Neg  Circumduction test   Neg  TTP at right middle finger A1 pulley, finger held in slight flexed position     Neurovascular Exam:  Digits WWP, brisk CR < 3s throughout  NVI motor/LTS to M/R/U nerves, radial pulse 2+  Tinel's Test - Carpal Tunnel  Neg  Tinel's Test - Cubital Tunnel  Neg  Phalen's Test    Neg  Median Nerve Compression Test Neg    ROM hand full, pain with long finger extension, locking noted on exam    ROM wrist full, painless    ROM elbow full, painless    Abdomen not guarded  Respirations nonlabored  Perfusion intact    Diagnostic Results:     Imaging - I independently viewed the patient's imaging as well as the radiology report.  Xrays of the patient's bilateral hands  demonstrate no evidence of any obvious acute fractures or dislocations or significant degenerative changes.      ASSESSMENT/PLAN:      1. Trigger finger, " right middle finger  dexAMETHasone injection 4 mg    Right middle trigger finger #2            Patient returns today for follow up of right middle trigger finger.  She will trial another injection today, this is her 2nd.  Discussed if no/minimal relief, we will obtain MRI of her right hand.  If she does get relief, can consider trigger finger release in operating room.    We will proceed today with trial of right middle finger injection.  Trigger finger injection given.  Post-injection instructions reviewed. Patient was advised to monitor her blood sugars closely over the next several days. The steroid may cause a rise in them. If her glucose levels rise to a point she is uncomfortable or she is unable to control them she is to contact her PCP or go immediately to the emergency department.     Cont oval 8 splint. Discussed 2 injections per lifetime to each finger.    Follow up:  If symptoms worsen or fail to improve  Xrays needed: none     All of the patient's questions were answered and the patient will contact us if they have any questions or concerns in the interim.

## 2024-06-19 NOTE — PROCEDURES
Right middle trigger finger #2    Date/Time: 6/19/2024 3:00 PM    Performed by: Denise Michel PA-C  Authorized by: Denise Michel PA-C    Consent Done?:  Yes (Verbal)  Indications:  Pain  Site marked: the procedure site was marked    Timeout: prior to procedure the correct patient, procedure, and site was verified    Prep: patient was prepped and draped in usual sterile fashion      Local anesthesia used?: Yes    Local anesthetic:  Bupivacaine 0.25% without epinephrine and topical anesthetic  Anesthetic total (ml):  0.5    Location:  Long finger  Site:  R long flexor tendon sheath  Ultrasonic guidance for needle placement?: No    Needle size:  25 G  Approach:  Volar  Medications:  4 mg dexAMETHasone (DECADRON) injection 4 mg/mL  Patient tolerance:  Patient tolerated the procedure well with no immediate complications

## 2024-06-20 ENCOUNTER — PATIENT MESSAGE (OUTPATIENT)
Dept: DIABETES | Facility: CLINIC | Age: 44
End: 2024-06-20
Payer: COMMERCIAL

## 2024-06-20 DIAGNOSIS — E10.65 TYPE 1 DIABETES MELLITUS WITH HYPERGLYCEMIA: ICD-10-CM

## 2024-06-20 RX ORDER — INSULIN ASPART 100 [IU]/ML
INJECTION, SOLUTION INTRAVENOUS; SUBCUTANEOUS
Refills: 0 | OUTPATIENT
Start: 2024-06-20 | End: 2025-06-20

## 2024-06-20 RX ORDER — INSULIN ASPART INJECTION 100 [IU]/ML
INJECTION, SOLUTION SUBCUTANEOUS
Refills: 3 | OUTPATIENT
Start: 2024-06-20

## 2024-06-20 RX ORDER — INSULIN ASPART 100 [IU]/ML
INJECTION, SOLUTION INTRAVENOUS; SUBCUTANEOUS
Qty: 30 EACH | Refills: 3 | Status: SHIPPED | OUTPATIENT
Start: 2024-06-20

## 2024-06-20 RX ORDER — INSULIN ASPART 100 [IU]/ML
INJECTION, SOLUTION INTRAVENOUS; SUBCUTANEOUS
Qty: 90 ML | Refills: 3 | Status: SHIPPED | OUTPATIENT
Start: 2024-06-20

## 2024-06-21 ENCOUNTER — TELEPHONE (OUTPATIENT)
Dept: DIABETES | Facility: CLINIC | Age: 44
End: 2024-06-21
Payer: COMMERCIAL

## 2024-06-21 NOTE — TELEPHONE ENCOUNTER
----- Message from Bonifacio Desir sent at 6/21/2024 12:21 PM CDT -----  Contact: Amy with [facility] pharmacy   Pharmacy is calling to clarify an RX.    RX name:  insulin aspart, niacinamide, (FIASP FLEXTOUCH U-100 INSULIN) 100 unit/mL (3 mL) InPn    What do they need to clarify:  This medication is on back order can something else be prescribed ?    Comments: [facility] - [facility] AT Portal to Registered [facility] Sites   Phone: [phone]  Fax: [phone] Amy  Ref # [id number]

## 2024-06-25 ENCOUNTER — TELEPHONE (OUTPATIENT)
Dept: DIABETES | Facility: CLINIC | Age: 44
End: 2024-06-25
Payer: COMMERCIAL

## 2024-06-25 NOTE — TELEPHONE ENCOUNTER
----- Message from Brittany Burns sent at 6/25/2024  9:31 AM CDT -----  Contact: [facility] [phone]  Pharmacy is calling to clarify an RX.    RX name:  nsulin aspart, niacinamide, (FIASP FLEXTOUCH U-100 INSULIN) 100 unit/mL (3 mL) InPn    What do they need to clarify:  It's on back order so is there a substitution you'd like them to dispense     Ref # [id number]

## 2024-06-26 ENCOUNTER — PATIENT MESSAGE (OUTPATIENT)
Dept: DIABETES | Facility: CLINIC | Age: 44
End: 2024-06-26
Payer: COMMERCIAL

## 2024-07-05 ENCOUNTER — PATIENT MESSAGE (OUTPATIENT)
Dept: ORTHOPEDICS | Facility: CLINIC | Age: 44
End: 2024-07-05
Payer: COMMERCIAL

## 2024-07-08 DIAGNOSIS — M65.331 TRIGGER FINGER, RIGHT MIDDLE FINGER: Primary | ICD-10-CM

## 2024-07-11 ENCOUNTER — PATIENT MESSAGE (OUTPATIENT)
Dept: DIABETES | Facility: CLINIC | Age: 44
End: 2024-07-11
Payer: COMMERCIAL

## 2024-07-11 DIAGNOSIS — E10.649 HYPOGLYCEMIA UNAWARENESS ASSOCIATED WITH TYPE 1 DIABETES MELLITUS: ICD-10-CM

## 2024-07-11 RX ORDER — INSULIN PUMP SYRINGE, 3 ML
EACH MISCELLANEOUS
Qty: 1 EACH | Refills: 0 | Status: SHIPPED | OUTPATIENT
Start: 2024-07-11 | End: 2025-07-11

## 2024-08-19 ENCOUNTER — PATIENT MESSAGE (OUTPATIENT)
Dept: OBSTETRICS AND GYNECOLOGY | Facility: CLINIC | Age: 44
End: 2024-08-19
Payer: COMMERCIAL

## 2024-08-19 RX ORDER — NORETHINDRONE ACETATE AND ETHINYL ESTRADIOL AND FERROUS FUMARATE 1.5-30(21)
1 KIT ORAL
Qty: 84 TABLET | Refills: 0 | Status: SHIPPED | OUTPATIENT
Start: 2024-08-19

## 2024-08-19 NOTE — TELEPHONE ENCOUNTER
Refill Routing Note   Medication(s) are not appropriate for processing by [facility] for the following reason(s):        Non-participating provider  No active prescription written by provider    [facility] action(s):  Route               Appointments  past 12m or future 3m with PCP    Date Provider   Last Visit   6/22/2023 Shea Talley, NP-C   Next Visit   Visit date not found Shea Talley, NP-C   ED visits in past 90 days: 0        Note composed:8:50 AM 08/19/2024

## 2024-08-19 NOTE — TELEPHONE ENCOUNTER
Please call patient and let her know that one month of refills sent, but more refills cannot be sent until she schedules an annual exam.

## 2024-08-28 ENCOUNTER — PATIENT MESSAGE (OUTPATIENT)
Dept: DIABETES | Facility: CLINIC | Age: 44
End: 2024-08-28
Payer: COMMERCIAL

## 2024-09-13 ENCOUNTER — PATIENT MESSAGE (OUTPATIENT)
Dept: DIABETES | Facility: CLINIC | Age: 44
End: 2024-09-13
Payer: COMMERCIAL

## 2024-09-13 DIAGNOSIS — E10.65 TYPE 1 DIABETES MELLITUS WITH HYPERGLYCEMIA: Primary | ICD-10-CM

## 2024-09-19 ENCOUNTER — PATIENT MESSAGE (OUTPATIENT)
Dept: PRIMARY CARE CLINIC | Facility: CLINIC | Age: 44
End: 2024-09-19
Payer: COMMERCIAL

## 2024-09-24 ENCOUNTER — PATIENT MESSAGE (OUTPATIENT)
Dept: DIABETES | Facility: CLINIC | Age: 44
End: 2024-09-24
Payer: COMMERCIAL

## 2024-09-24 ENCOUNTER — TELEPHONE (OUTPATIENT)
Dept: DIABETES | Facility: CLINIC | Age: 44
End: 2024-09-24
Payer: COMMERCIAL

## 2024-09-25 ENCOUNTER — TELEPHONE (OUTPATIENT)
Dept: DIABETES | Facility: CLINIC | Age: 44
End: 2024-09-25
Payer: COMMERCIAL

## 2024-09-25 ENCOUNTER — PATIENT MESSAGE (OUTPATIENT)
Dept: DIABETES | Facility: CLINIC | Age: 44
End: 2024-09-25
Payer: COMMERCIAL

## 2024-09-25 NOTE — TELEPHONE ENCOUNTER
----- Message from Klaudia Rankin LPN sent at 9/24/2024  4:16 PM CDT -----  Please reschedule 10/8 appointment to 10/28 at 9:30

## 2024-10-09 ENCOUNTER — PATIENT MESSAGE (OUTPATIENT)
Dept: DIABETES | Facility: CLINIC | Age: 44
End: 2024-10-09
Payer: COMMERCIAL

## 2024-10-09 DIAGNOSIS — E10.65 TYPE 1 DIABETES MELLITUS WITH HYPERGLYCEMIA: ICD-10-CM

## 2024-10-09 RX ORDER — INSULIN GLARGINE 300 [IU]/ML
40 INJECTION, SOLUTION SUBCUTANEOUS NIGHTLY
Qty: 12 PEN | Refills: 2 | Status: SHIPPED | OUTPATIENT
Start: 2024-10-09

## 2024-10-09 RX ORDER — INSULIN GLARGINE 100 [IU]/ML
INJECTION, SOLUTION SUBCUTANEOUS
Qty: 15 ML | Refills: 6 | Status: SHIPPED | OUTPATIENT
Start: 2024-10-09

## 2024-10-15 ENCOUNTER — PATIENT MESSAGE (OUTPATIENT)
Dept: DIABETES | Facility: CLINIC | Age: 44
End: 2024-10-15
Payer: COMMERCIAL

## 2024-10-15 ENCOUNTER — TELEPHONE (OUTPATIENT)
Dept: OBSTETRICS AND GYNECOLOGY | Facility: CLINIC | Age: 44
End: 2024-10-15
Payer: COMMERCIAL

## 2024-10-16 ENCOUNTER — PATIENT MESSAGE (OUTPATIENT)
Dept: ORTHOPEDICS | Facility: CLINIC | Age: 44
End: 2024-10-16
Payer: COMMERCIAL

## 2024-10-25 ENCOUNTER — TELEPHONE (OUTPATIENT)
Dept: DIABETES | Facility: CLINIC | Age: 44
End: 2024-10-25
Payer: COMMERCIAL

## 2024-10-28 ENCOUNTER — PATIENT MESSAGE (OUTPATIENT)
Dept: PRIMARY CARE CLINIC | Facility: CLINIC | Age: 44
End: 2024-10-28
Payer: COMMERCIAL

## 2024-10-29 ENCOUNTER — TELEPHONE (OUTPATIENT)
Dept: RADIOLOGY | Facility: HOSPITAL | Age: 44
End: 2024-10-29
Payer: COMMERCIAL

## 2024-10-29 ENCOUNTER — TELEPHONE (OUTPATIENT)
Dept: OTOLARYNGOLOGY | Facility: CLINIC | Age: 44
End: 2024-10-29
Payer: COMMERCIAL

## 2024-10-29 ENCOUNTER — OFFICE VISIT (OUTPATIENT)
Dept: OBSTETRICS AND GYNECOLOGY | Facility: CLINIC | Age: 44
End: 2024-10-29
Payer: COMMERCIAL

## 2024-10-29 ENCOUNTER — OFFICE VISIT (OUTPATIENT)
Dept: DIABETES | Facility: CLINIC | Age: 44
End: 2024-10-29
Payer: COMMERCIAL

## 2024-10-29 VITALS
OXYGEN SATURATION: 98 % | BODY MASS INDEX: 29.6 KG/M2 | SYSTOLIC BLOOD PRESSURE: 118 MMHG | WEIGHT: 189 LBS | DIASTOLIC BLOOD PRESSURE: 74 MMHG | HEART RATE: 90 BPM

## 2024-10-29 VITALS
BODY MASS INDEX: 29.72 KG/M2 | HEIGHT: 67 IN | WEIGHT: 189.38 LBS | SYSTOLIC BLOOD PRESSURE: 116 MMHG | DIASTOLIC BLOOD PRESSURE: 85 MMHG

## 2024-10-29 DIAGNOSIS — Z96.41 INSULIN PUMP IN PLACE: ICD-10-CM

## 2024-10-29 DIAGNOSIS — Z46.81 INSULIN PUMP FITTING OR ADJUSTMENT: ICD-10-CM

## 2024-10-29 DIAGNOSIS — E66.3 OVERWEIGHT (BMI 25.0-29.9): ICD-10-CM

## 2024-10-29 DIAGNOSIS — J32.8 OTHER CHRONIC SINUSITIS: ICD-10-CM

## 2024-10-29 DIAGNOSIS — Z71.9 HEALTH EDUCATION/COUNSELING: ICD-10-CM

## 2024-10-29 DIAGNOSIS — E10.649 TYPE 1 DIABETES MELLITUS WITH HYPOGLYCEMIA AND WITHOUT COMA: ICD-10-CM

## 2024-10-29 DIAGNOSIS — J32.9 RECURRENT SINUSITIS: ICD-10-CM

## 2024-10-29 DIAGNOSIS — Z12.31 ENCOUNTER FOR SCREENING MAMMOGRAM FOR MALIGNANT NEOPLASM OF BREAST: ICD-10-CM

## 2024-10-29 DIAGNOSIS — E28.2 PCOS (POLYCYSTIC OVARIAN SYNDROME): ICD-10-CM

## 2024-10-29 DIAGNOSIS — N64.4 MASTODYNIA: ICD-10-CM

## 2024-10-29 DIAGNOSIS — E10.65 TYPE 1 DIABETES MELLITUS WITH HYPERGLYCEMIA: Primary | ICD-10-CM

## 2024-10-29 DIAGNOSIS — M25.531 RIGHT WRIST PAIN: Primary | ICD-10-CM

## 2024-10-29 DIAGNOSIS — N93.9 ABNORMAL UTERINE BLEEDING (AUB): Primary | ICD-10-CM

## 2024-10-29 PROCEDURE — 1159F MED LIST DOCD IN RCRD: CPT | Mod: CPTII,S$GLB,, | Performed by: OBSTETRICS & GYNECOLOGY

## 2024-10-29 PROCEDURE — 3061F NEG MICROALBUMINURIA REV: CPT | Mod: CPTII,S$GLB,, | Performed by: OBSTETRICS & GYNECOLOGY

## 2024-10-29 PROCEDURE — 95251 CONT GLUC MNTR ANALYSIS I&R: CPT | Mod: S$GLB,,, | Performed by: NURSE PRACTITIONER

## 2024-10-29 PROCEDURE — 3051F HG A1C>EQUAL 7.0%<8.0%: CPT | Mod: CPTII,S$GLB,, | Performed by: NURSE PRACTITIONER

## 2024-10-29 PROCEDURE — 3074F SYST BP LT 130 MM HG: CPT | Mod: CPTII,S$GLB,, | Performed by: NURSE PRACTITIONER

## 2024-10-29 PROCEDURE — 99999 PR PBB SHADOW E&M-EST. PATIENT-LVL V: CPT | Mod: PBBFAC,,, | Performed by: NURSE PRACTITIONER

## 2024-10-29 PROCEDURE — 1159F MED LIST DOCD IN RCRD: CPT | Mod: CPTII,S$GLB,, | Performed by: NURSE PRACTITIONER

## 2024-10-29 PROCEDURE — 3008F BODY MASS INDEX DOCD: CPT | Mod: CPTII,S$GLB,, | Performed by: NURSE PRACTITIONER

## 2024-10-29 PROCEDURE — 3066F NEPHROPATHY DOC TX: CPT | Mod: CPTII,S$GLB,, | Performed by: OBSTETRICS & GYNECOLOGY

## 2024-10-29 PROCEDURE — 99999 PR PBB SHADOW E&M-EST. PATIENT-LVL V: CPT | Mod: PBBFAC,,, | Performed by: OBSTETRICS & GYNECOLOGY

## 2024-10-29 PROCEDURE — 99213 OFFICE O/P EST LOW 20 MIN: CPT | Mod: S$GLB,,, | Performed by: OBSTETRICS & GYNECOLOGY

## 2024-10-29 PROCEDURE — 3078F DIAST BP <80 MM HG: CPT | Mod: CPTII,S$GLB,, | Performed by: NURSE PRACTITIONER

## 2024-10-29 PROCEDURE — 3008F BODY MASS INDEX DOCD: CPT | Mod: CPTII,S$GLB,, | Performed by: OBSTETRICS & GYNECOLOGY

## 2024-10-29 PROCEDURE — 3051F HG A1C>EQUAL 7.0%<8.0%: CPT | Mod: CPTII,S$GLB,, | Performed by: OBSTETRICS & GYNECOLOGY

## 2024-10-29 PROCEDURE — 99214 OFFICE O/P EST MOD 30 MIN: CPT | Mod: S$GLB,,, | Performed by: NURSE PRACTITIONER

## 2024-10-29 PROCEDURE — 3079F DIAST BP 80-89 MM HG: CPT | Mod: CPTII,S$GLB,, | Performed by: OBSTETRICS & GYNECOLOGY

## 2024-10-29 PROCEDURE — 1160F RVW MEDS BY RX/DR IN RCRD: CPT | Mod: CPTII,S$GLB,, | Performed by: NURSE PRACTITIONER

## 2024-10-29 PROCEDURE — 3074F SYST BP LT 130 MM HG: CPT | Mod: CPTII,S$GLB,, | Performed by: OBSTETRICS & GYNECOLOGY

## 2024-10-29 RX ORDER — FLUTICASONE PROPIONATE 50 MCG
1 SPRAY, SUSPENSION (ML) NASAL
COMMUNITY
Start: 2024-10-23 | End: 2024-11-22

## 2024-10-29 RX ORDER — INSULIN HUMAN 4 1/1
POWDER, METERED RESPIRATORY (INHALATION)
COMMUNITY

## 2024-10-29 RX ORDER — AZELASTINE HCL 205.5 UG/1
1 SPRAY NASAL 2 TIMES DAILY
COMMUNITY
Start: 2024-10-23 | End: 2024-11-22

## 2024-10-29 RX ORDER — LANCETS 30 GAUGE
EACH MISCELLANEOUS
COMMUNITY
Start: 2024-07-11

## 2024-10-29 RX ORDER — BENZONATATE 100 MG/1
200 CAPSULE ORAL 3 TIMES DAILY PRN
COMMUNITY
Start: 2024-10-23 | End: 2024-10-30

## 2024-10-29 RX ORDER — NORETHINDRONE ACETATE AND ETHINYL ESTRADIOL 1.5-30(21)
1 KIT ORAL DAILY
COMMUNITY
Start: 2024-08-19

## 2024-10-29 RX ORDER — LEVOCETIRIZINE DIHYDROCHLORIDE 5 MG/1
1 TABLET, FILM COATED ORAL NIGHTLY
COMMUNITY
Start: 2024-10-23 | End: 2025-10-23

## 2024-10-31 ENCOUNTER — TELEPHONE (OUTPATIENT)
Dept: DIABETES | Facility: CLINIC | Age: 44
End: 2024-10-31
Payer: COMMERCIAL

## 2024-11-01 ENCOUNTER — TELEPHONE (OUTPATIENT)
Dept: DIABETES | Facility: CLINIC | Age: 44
End: 2024-11-01
Payer: COMMERCIAL

## 2024-11-06 ENCOUNTER — PATIENT MESSAGE (OUTPATIENT)
Dept: OBSTETRICS AND GYNECOLOGY | Facility: CLINIC | Age: 44
End: 2024-11-06
Payer: COMMERCIAL

## 2024-11-07 ENCOUNTER — PATIENT MESSAGE (OUTPATIENT)
Dept: ORTHOPEDICS | Facility: CLINIC | Age: 44
End: 2024-11-07
Payer: COMMERCIAL

## 2024-11-07 ENCOUNTER — TELEPHONE (OUTPATIENT)
Dept: OBSTETRICS AND GYNECOLOGY | Facility: CLINIC | Age: 44
End: 2024-11-07
Payer: COMMERCIAL

## 2024-11-07 NOTE — TELEPHONE ENCOUNTER
Called patient to reschedule nexplanon appt /.. Due to her having to work on original procedure date patient understand new date and time

## 2024-11-12 ENCOUNTER — OFFICE VISIT (OUTPATIENT)
Dept: ORTHOPEDICS | Facility: CLINIC | Age: 44
End: 2024-11-12
Payer: COMMERCIAL

## 2024-11-12 VITALS
HEIGHT: 67 IN | HEART RATE: 85 BPM | SYSTOLIC BLOOD PRESSURE: 111 MMHG | BODY MASS INDEX: 30.13 KG/M2 | DIASTOLIC BLOOD PRESSURE: 73 MMHG | WEIGHT: 192 LBS

## 2024-11-12 DIAGNOSIS — M65.331 TRIGGER FINGER, RIGHT MIDDLE FINGER: Primary | ICD-10-CM

## 2024-11-12 PROCEDURE — 3066F NEPHROPATHY DOC TX: CPT | Mod: CPTII,S$GLB,,

## 2024-11-12 PROCEDURE — 99213 OFFICE O/P EST LOW 20 MIN: CPT | Mod: 25,S$GLB,,

## 2024-11-12 PROCEDURE — 3008F BODY MASS INDEX DOCD: CPT | Mod: CPTII,S$GLB,,

## 2024-11-12 PROCEDURE — 3078F DIAST BP <80 MM HG: CPT | Mod: CPTII,S$GLB,,

## 2024-11-12 PROCEDURE — 99999 PR PBB SHADOW E&M-EST. PATIENT-LVL IV: CPT | Mod: PBBFAC,,,

## 2024-11-12 PROCEDURE — 1159F MED LIST DOCD IN RCRD: CPT | Mod: CPTII,S$GLB,,

## 2024-11-12 PROCEDURE — 20550 NJX 1 TENDON SHEATH/LIGAMENT: CPT | Mod: RT,S$GLB,,

## 2024-11-12 PROCEDURE — 3051F HG A1C>EQUAL 7.0%<8.0%: CPT | Mod: CPTII,S$GLB,,

## 2024-11-12 PROCEDURE — 3074F SYST BP LT 130 MM HG: CPT | Mod: CPTII,S$GLB,,

## 2024-11-12 PROCEDURE — 3061F NEG MICROALBUMINURIA REV: CPT | Mod: CPTII,S$GLB,,

## 2024-11-12 RX ORDER — DEXAMETHASONE SODIUM PHOSPHATE 4 MG/ML
4 INJECTION, SOLUTION INTRA-ARTICULAR; INTRALESIONAL; INTRAMUSCULAR; INTRAVENOUS; SOFT TISSUE
Status: COMPLETED | OUTPATIENT
Start: 2024-11-12 | End: 2024-11-12

## 2024-11-12 RX ORDER — INSULIN PMP CART,AUT,G6/7,CNTR
EACH SUBCUTANEOUS
COMMUNITY
Start: 2024-11-06

## 2024-11-12 RX ADMIN — DEXAMETHASONE SODIUM PHOSPHATE 4 MG: 4 INJECTION, SOLUTION INTRA-ARTICULAR; INTRALESIONAL; INTRAMUSCULAR; INTRAVENOUS; SOFT TISSUE at 09:11

## 2024-11-12 RX ADMIN — DEXAMETHASONE SODIUM PHOSPHATE 4 MG: 4 INJECTION, SOLUTION INTRA-ARTICULAR; INTRALESIONAL; INTRAMUSCULAR; INTRAVENOUS; SOFT TISSUE at 10:11

## 2024-11-12 NOTE — PROGRESS NOTES
SUBJECTIVE:      Chief Complaint: right middle trigger finger    History of Present Illness:  Patient is a 44 y.o. right hand dominant female who presents today with complaints of right middle trigger finger.  Patient reports a history this her left hand as well as carpal tunnel syndrome.   Reports this started about 6 months ago and has progressively worsened.  No known MILEY.  Reports her middle finger intermittently locks and she has pain at the base of her finger.  She is a diabetic, last A1c unknown.  She is interested in an injection today.     The patient is a/an .    Onset of symptoms/DOI was 6 months prior.    Symptoms are aggravated by movement.    Symptoms are alleviated by rest.    Symptoms consist of locking and pain.    The patient rates their pain as a 4/10.    Attempted treatment(s) and/or interventions include activity modifications, rest, rest.     The patient denies any fevers, chills, N/V, D/C and presents for evaluation.    ____________________________________________________________________    Interval history 10/31/2023: Patient returns today for follow up of trigger finger. She reports pain in her left ring finger today. Previously had right long finger injected with significant relief. She has been wearing the oval 8 splint at night to her left ring finger. Does report her whole hand gets numb if she does not wear the splint at night. She would like to repeat injection today to her left hand.       ____________________________________________________________________    Interval history 6/19/2024 : Patient returns today for follow up of right middle finger. Had a trigger finger injection by myself 1 year ago. She feels this only helped about 25%. The left hand from October 2023 worked well.  She would like to trial right middle trigger finger injection today.     ____________________________________________________________________    Interval history 11/12/2024 : Patient returns  today for follow up of right middle trigger finger.  She has tried massage with no improvement.  States injection did not really help.  She was unable to fully extend finger without pain.  Finger PIP joint remains in flexed position.        Past Medical History:   Diagnosis Date    Diabetes mellitus type I      Past Surgical History:   Procedure Laterality Date    CARPAL TUNNEL RELEASE Right      SECTION      MYOMECTOMY       Review of patient's allergies indicates:  No Known Allergies  Social History     Social History Narrative    ** Merged History Encounter **          Family History   Problem Relation Name Age of Onset    Diabetes Paternal Grandmother          type 1    Diabetes Maternal Grandmother          type 2    Diabetes Father          type 1    Diabetes Mother          type 2    Breast cancer Maternal Aunt      Ovarian cancer Paternal Aunt      Colon cancer Neg Hx           Current Outpatient Medications:     azelastine (ASTELIN) 137 mcg (0.1 %) nasal spray, 1 spray (137 mcg total) by Nasal route 2 (two) times daily., Disp: 30 mL, Rfl: 0    azelastine 205.5 mcg (0.15 %) Spry, 1 spray by Nasal route 2 (two) times daily., Disp: , Rfl:     blood sugar diagnostic Strp, To check BG 4 times daily, to use with insurance preferred meter, Disp: 400 each, Rfl: 3    blood-glucose meter kit, To check BG 4 times daily, to use with insurance preferred meter, Disp: 1 each, Rfl: 0    blood-glucose sensor (DEXCOM G6 SENSOR) Janny, Use as directed. Change sensor every 10 days., Disp: 3 each, Rfl: 11    blood-glucose transmitter (DEXCOM G6 TRANSMITTER) Janny, Use as directed. Change transmitter every 3 months., Disp: 1 each, Rfl: 3    fluticasone propionate (FLONASE) 50 mcg/actuation nasal spray, 1 spray by Nasal route., Disp: , Rfl:     glucagon (BAQSIMI) 3 mg/actuation Spry, Use as needed for severe hypoglycemia, Disp: 2 each, Rfl: 1    insulin aspart U-100 (NOVOLOG FLEXPEN U-100 INSULIN) 100 unit/mL (3 mL) InPn  pen, ICR 1:6 TID AC + correction scale. To have in case of insulin pump failureICR 1:6 TID AC + correction scale. To have in case of insulin pump failure. Max TDD of 100 units, Disp: 30 each, Rfl: 3    insulin aspart, niacinamide, (FIASP FLEXTOUCH U-100 INSULIN) 100 unit/mL (3 mL) InPn, ICR 1:6 TID AC + correction scale. To have in case of insulin pump failureICR 1:6 TID AC + correction scale. To have in case of insulin pump failure. Max TDD of 100 units, Disp: 30 Pen, Rfl: 3    insulin aspart, niacinamide, (FIASP U-100 INSULIN) 100 unit/mL Soln, To use continuously with Omnipod 5. Max TDD of 100 units, Disp: 90 mL, Rfl: 3    insulin glargine U-100, Lantus, (LANTUS SOLOSTAR U-100 INSULIN) 100 unit/mL (3 mL) InPn pen, Inject 40 Units into the skin every evening. Titrate up until FBG is <130. Max TDD 50 units. To have as backup insulin in case of pump failure, Disp: 15 mL, Rfl: 6    insulin glargine, TOUJEO, (TOUJEO SOLOSTAR U-300 INSULIN) 300 unit/mL (1.5 mL) InPn pen, Inject 40 Units into the skin every evening. Titrate up until FBG is <130. Max TDD 60 units.  To have as backup insulin in case of pump failure, Disp: 12 Pen, Rfl: 2    insulin pump cart,automated,BT (OMNIPOD 5 G6 PODS, GEN 5,) Crtg, Inject 1 Device into the skin every 48 hours., Disp: 3 each, Rfl: 11    insulin regular human (AFREZZA) 4 unit CtDv, 1 cartridge at the beginning of each meal Inhalation Three times a day, Disp: , Rfl:     lancets Misc, To check BG 4 times daily, to use with insurance preferred meter, Disp: 150 each, Rfl: 11    levocetirizine (XYZAL) 5 MG tablet, Take 1 tablet by mouth every evening., Disp: , Rfl:     norethindrone-ethinyl estradiol-iron (MICROGESTIN FE 1.5/30, 28,) 1.5 mg-30 mcg (21)/75 mg (7) tablet, TAKE 1 TABLET BY MOUTH EVERY DAY, Disp: 84 tablet, Rfl: 0    norethindrone-ethinyl estradiol-iron (MICROGESTIN FE 1.5/30, 28,) 1.5 mg-30 mcg (21)/75 mg (7) tablet, Take 1 tablet by mouth once daily., Disp: , Rfl:     OMNIPOD  "5 G6-G7 PODS, GEN 5, Crtg, SMARTSI Device SUB-Q Every Other Day, Disp: , Rfl:     ONETOUCH DELICA PLUS LANCET 33 gauge Misc, Apply topically., Disp: , Rfl:     ONETOUCH VERIO FLEX METER [facility], USE TO CHECK BLOOD GLUSCOSE 4 TIMES PER DAY, Disp: , Rfl:     pen needle, diabetic (BD ULTRA-FINE IZZY PEN NEEDLE) 32 gauge x 5/32" Ndle, 1 Device by Misc.(Non-Drug; Combo Route) route 6 (six) times daily., Disp: 550 each, Rfl: 3    pravastatin (PRAVACHOL) 10 MG tablet, Take 1 tablet (10 mg total) by mouth once daily., Disp: 90 tablet, Rfl: 3    semaglutide (OZEMPIC) 1 mg/dose (4 mg/3 mL), Inject 1 mg into the skin every 7 days., Disp: 1 each, Rfl: 6    valACYclovir (VALTREX) 1000 MG tablet, Take 1 tablet (1,000 mg total) by mouth once daily. Start after 7-day course, for suppression., Disp: 30 tablet, Rfl: 11  No current facility-administered medications for this visit.      Review of Systems:  Constitutional: no fever or chills  Eyes: no visual changes  ENT: no nasal congestion or sore throat  Respiratory: no cough or shortness of breath  Cardiovascular: no chest pain  Gastrointestinal: no nausea or vomiting, tolerating diet  Musculoskeletal: pain and soreness    OBJECTIVE:      Vital Signs (Most Recent):  Vitals:    24 0956   BP: 111/73   BP Location: Left arm   Patient Position: Sitting   Pulse: 85   Weight: 87.1 kg (192 lb 0.3 oz)   Height: 5' 7" (1.702 m)     Body mass index is 30.07 kg/m².      Physical Exam:  Constitutional: The patient appears well-developed and well-nourished. No distress.   Skin: No lesions appreciated  Head: Normocephalic and atraumatic.   Nose: Nose normal.   Ears: No deformities seen  Eyes: Conjunctivae and EOM are normal.   Neck: No tracheal deviation present.   Cardiovascular: Normal rate and intact distal pulses.    Pulmonary/Chest: Effort normal. No respiratory distress.   Abdominal: There is no guarding.   Neurological: The patient is alert.   Psychiatric: The patient has a normal " mood and affect.     Right Hand/Wrist Examination:    Observation/Inspection:  Swelling  none    Deformity  none  Discoloration  none     Scars   none    Atrophy  none    HAND/WRIST EXAMINATION:  Finkelstein's Test   Neg  WHAT Test    Neg  Snuff box tenderness   Neg  Scott's Test    Neg  Hook of Hamate Tenderness  Neg  CMC grind    Neg  Circumduction test   Neg  TTP at right middle finger A1 pulley, finger held in slight flexed position at PIPJ    Neurovascular Exam:  Digits WWP, brisk CR < 3s throughout  NVI motor/LTS to M/R/U nerves, radial pulse 2+  Tinel's Test - Carpal Tunnel  Neg  Tinel's Test - Cubital Tunnel  Neg  Phalen's Test    Neg  Median Nerve Compression Test Neg    ROM hand full, pain with long finger extension, locking noted on exam    ROM wrist full, painless    ROM elbow full, painless    Abdomen not guarded  Respirations nonlabored  Perfusion intact    Diagnostic Results:     Imaging - I independently viewed the patient's imaging as well as the radiology report.  Xrays of the patient's bilateral hands  demonstrate no evidence of any obvious acute fractures or dislocations or significant degenerative changes.      ASSESSMENT/PLAN:      1. Trigger finger, right middle finger  dexAMETHasone injection 4 mg    Right middle trigger finger #2            Patient returns today for follow up of right middle trigger finger.  She will trial another injection today.  Discussed if improvement would recommend trigger finger release.  She was not interested for surgery until the new year.  She was also given a new oval 8 splint.    We will proceed today with trial of right middle finger injection.  Trigger finger injection given.  Post-injection instructions reviewed. Patient was advised to monitor her blood sugars closely over the next several days. The steroid may cause a rise in them. If her glucose levels rise to a point she is uncomfortable or she is unable to control them she is to contact her PCP or go  immediately to the emergency department.     Follow up: 8 weeks, consider trigger finger release  Xrays needed: none     All of the patient's questions were answered and the patient will contact us if they have any questions or concerns in the interim.

## 2024-11-12 NOTE — PROCEDURES
Right middle trigger finger #2    Date/Time: 11/12/2024 9:30 AM    Performed by: Denise Michel PA-C  Authorized by: Denise Michel PA-C    Consent Done?:  Yes (Verbal)  Indications:  Pain  Site marked: the procedure site was marked    Timeout: prior to procedure the correct patient, procedure, and site was verified    Prep: patient was prepped and draped in usual sterile fashion      Local anesthesia used?: Yes    Local anesthetic:  Bupivacaine 0.25% without epinephrine and topical anesthetic  Anesthetic total (ml):  0.5    Location:  Long finger  Site:  R long flexor tendon sheath  Ultrasonic guidance for needle placement?: No    Needle size:  25 G  Approach:  Volar  Medications:  4 mg dexAMETHasone (DECADRON) injection 4 mg/mL  Patient tolerance:  Patient tolerated the procedure well with no immediate complications

## 2024-11-14 NOTE — TELEPHONE ENCOUNTER
Refill Routing Note   Medication(s) are not appropriate for processing by [facility] for the following reason(s):        Non-participating provider    [facility] action(s):  Route               Appointments  past 12m or future 3m with PCP    Date Provider   Last Visit   6/22/2023 Shea Correa, NP-C   Next Visit   Visit date not found Shea Correa NP-C   ED visits in past 90 days: 0        Note composed:10:27 AM 11/14/2024

## 2024-11-15 RX ORDER — NORETHINDRONE ACETATE AND ETHINYL ESTRADIOL AND FERROUS FUMARATE 1.5-30(21)
1 KIT ORAL
Qty: 84 TABLET | Refills: 0 | OUTPATIENT
Start: 2024-11-15

## 2024-11-19 ENCOUNTER — OFFICE VISIT (OUTPATIENT)
Dept: OBSTETRICS AND GYNECOLOGY | Facility: CLINIC | Age: 44
End: 2024-11-19
Payer: COMMERCIAL

## 2024-11-19 VITALS
SYSTOLIC BLOOD PRESSURE: 122 MMHG | DIASTOLIC BLOOD PRESSURE: 77 MMHG | HEIGHT: 67 IN | BODY MASS INDEX: 30.31 KG/M2 | WEIGHT: 193.13 LBS

## 2024-11-19 DIAGNOSIS — Z30.017 NEXPLANON INSERTION: Primary | ICD-10-CM

## 2024-11-19 PROCEDURE — 11981 INSERTION DRUG DLVR IMPLANT: CPT | Mod: S$GLB,,, | Performed by: OBSTETRICS & GYNECOLOGY

## 2024-11-19 PROCEDURE — 99999 PR PBB SHADOW E&M-EST. PATIENT-LVL IV: CPT | Mod: PBBFAC,,, | Performed by: OBSTETRICS & GYNECOLOGY

## 2024-11-19 PROCEDURE — 99499 UNLISTED E&M SERVICE: CPT | Mod: S$GLB,,, | Performed by: OBSTETRICS & GYNECOLOGY

## 2024-11-19 RX ORDER — SEMAGLUTIDE 0.68 MG/ML
INJECTION, SOLUTION SUBCUTANEOUS
COMMUNITY
Start: 2024-11-17

## 2024-11-19 NOTE — PROCEDURES
Insertion of Nexplanon    Date/Time: 11/19/2024 2:30 PM    Performed by: Crissy Ponce MD  Authorized by: Crissy Ponce MD    Consent:   Consent obtained:  Prior to procedure the appropriate consent was completed and verified  Consent given by:  Patient  Patient questions answered: yes    Patient agrees, verbalizes understanding, and wants to proceed: yes    Pre-Procedure:   Prepped with: povidone-iodine    Local anesthetic:  Lidocaine 1%  The site was cleaned and prepped in a sterile fashion: yes    Insertion Procedure:   Left/right:  left arm   68 mg etonogestreL 68 mg  Preloaded Implanon trocar was placed subdermally: yes    Visualization of implant was obtained: yes    Nexplanon was inserted and trocar removed: yes    Visualization of notch in stilette and palpitation of device: yes    Palpation confirms placement by provider and patient: yes (patient declined)    Site was closed with steri-strips and pressure bandage applied: yes

## 2024-12-09 ENCOUNTER — PATIENT MESSAGE (OUTPATIENT)
Dept: DIABETES | Facility: CLINIC | Age: 44
End: 2024-12-09
Payer: COMMERCIAL

## 2024-12-15 ENCOUNTER — PATIENT MESSAGE (OUTPATIENT)
Dept: OBSTETRICS AND GYNECOLOGY | Facility: CLINIC | Age: 44
End: 2024-12-15
Payer: COMMERCIAL

## 2024-12-18 DIAGNOSIS — E10.65 TYPE 1 DIABETES MELLITUS WITH HYPERGLYCEMIA: ICD-10-CM

## 2024-12-18 DIAGNOSIS — E66.09 OTHER OBESITY DUE TO EXCESS CALORIES: ICD-10-CM

## 2024-12-18 DIAGNOSIS — E88.819 INSULIN RESISTANCE: ICD-10-CM

## 2024-12-18 RX ORDER — SEMAGLUTIDE 0.68 MG/ML
0.5 INJECTION, SOLUTION SUBCUTANEOUS WEEKLY
Qty: 3 EACH | Refills: 6 | OUTPATIENT
Start: 2024-12-18

## 2024-12-18 RX ORDER — SEMAGLUTIDE 1.34 MG/ML
1 INJECTION, SOLUTION SUBCUTANEOUS
Qty: 1 EACH | Refills: 6 | Status: SHIPPED | OUTPATIENT
Start: 2024-12-18 | End: 2025-12-18

## 2024-12-18 NOTE — TELEPHONE ENCOUNTER
Please see the attached refill request. Pt stated she is on 1mg of ozempic, [facility]

## 2024-12-18 NOTE — TELEPHONE ENCOUNTER
Pt stated she is on 1mg dose of ozempic to send refills to [facility] pharmacy

## 2025-01-03 ENCOUNTER — OFFICE VISIT (OUTPATIENT)
Dept: OTOLARYNGOLOGY | Facility: CLINIC | Age: 45
End: 2025-01-03
Payer: COMMERCIAL

## 2025-01-03 VITALS
DIASTOLIC BLOOD PRESSURE: 82 MMHG | WEIGHT: 196.31 LBS | BODY MASS INDEX: 30.81 KG/M2 | HEART RATE: 94 BPM | SYSTOLIC BLOOD PRESSURE: 126 MMHG | HEIGHT: 67 IN

## 2025-01-03 DIAGNOSIS — J32.9 CHRONIC SINUSITIS, UNSPECIFIED LOCATION: Primary | ICD-10-CM

## 2025-01-03 DIAGNOSIS — J32.9 RECURRENT SINUSITIS: ICD-10-CM

## 2025-01-03 PROCEDURE — 99999 PR PBB SHADOW E&M-EST. PATIENT-LVL V: CPT | Mod: PBBFAC,,, | Performed by: OTOLARYNGOLOGY

## 2025-01-03 RX ORDER — METHYLPREDNISOLONE 4 MG/1
TABLET ORAL
Qty: 21 EACH | Refills: 1 | Status: SHIPPED | OUTPATIENT
Start: 2025-01-03

## 2025-01-03 RX ORDER — CEFDINIR 300 MG/1
300 CAPSULE ORAL 2 TIMES DAILY
Qty: 42 CAPSULE | Refills: 0 | Status: SHIPPED | OUTPATIENT
Start: 2025-01-03 | End: 2025-01-24

## 2025-01-03 NOTE — PATIENT INSTRUCTIONS
Prolonged course of antibiotic (cefdinir x 3 weeks).    Oral steroids with Medrol dose pack ONLY if not improving with antibiotics and topical measures as below (to avoid worsening blood glucose).    CT scan in 2 weeks.    Return in 3 weeks.      AFRIN/OXYMETAZOLINE NASAL DECONGESTANT REGIMEN    Afrin decongestant nasal spray 2 sprays towards the ear each side twice daily for 3 days, stop for 1 day, return for 3 more days then discontinue use entirely.  This can be done to try and stave off a full-blown sinus infection.  Regular use of Afrin even on an off done regularly could result in rebound congestion.      NASAL SALINE    Still saline comes in many preparations including sprays/mists, gels, and rinses.  Different preparations served different purposes.  Saline spray helps to briefly moisturize the nose and help clear mucus.  Saline gels coat the nose for longer protective benefit of keeping the linings the nose moist.  Saline rinses clear the nose and sinuses and a more thorough way in her best used for significant postnasal drip and sinus complaints.  A combination of saline sprays/mists, gels and rinses should be used to address routine nasal clearing and dryness issues as well as flushing for better control of allergy and postnasal drip symptoms.  There is no real risk of over use of nasal saline products.  Saline sprays do not have any of the potential rebound or addiction of nasal decongestant sprays.  Nasal saline sprays and rinses should be used prior to the application of any medicated nasal sprays such as nasal steroids or nasal antihistamine sprays.      INTRANASAL STEROID SPRAYS      Intranasal steroid sprays are available both by prescription and over-the-counter both in generic and brand name preparations.  They are all very similar in efficacy and side effect profiles.  Over-the-counter and prescription intranasal steroids include fluticasone propionate (Flonase), fluticasone furoate  (Sensimist), triamcinolone (Nasacort), and budesonide (Rhinocort).  While these medications are available as prescriptions as well there are few nasal steroids in her available by prescription only and include mometasone (Nasonex), flunisolide (Nasarel), and beclomethasone (QNASL).    Nasal steroids or the foundation of treatment of both allergy and other inflammatory conditions of the nose and sinuses.  They are safe for regular use and while there are many side effects listed most of these are steroid class effects and not typically encountered.  Typical side effects include dryness and even ulceration and bleeding of the nose.  These side effects can be minimized by proper application and proper moisturization with saline and saline gel.    Sometimes changing between 1 brand of nasal steroid and another can result in improved control of symptoms especially after long term use of one particular nasal steroid.    Proper application of the nasal steroid spray is accomplished by spraying towards the I/ear on the same side with the tip of the superior just barely inside the nostril with the chin slightly downward.  Any dripping should be gently inhaled not sniff test backwards into the throat.  Labeled instructions should be followed.        SINUS RINSE INSTRUCTIONS    Nasal Saline Irrigation Instructions  You can wash your nasal and sinus passages using nasal saline (salt water) irrigation. This   is simple and effective. Follow the instructions below, as well as the ones provided by your   physician.  Supplies  First, you will need a nasal saline irrigation bottle and rinse solution.   You can purchase nasal rinse kits that include these items (such as   NeilMed®, Ayr®, Simply Saline®, Ocean Complete®) at most drug   stores. You can also make your own saline irrigation solution by   adding kosher (non-iodine) salt and baking soda to distilled water.   Your physician may tell you to add medications like a steroid or    antibiotic to the rinse as needed.  Steps for nasal irrigation  Step 1. Fill the bottle  ? Wash your hands.  ? Fill the irrigation bottle with lukewarm distilled water or boiled water that has cooled.  Step 2. Mix the solution  ? Put the saline and salt packet contents into the bottle.  ? Tighten the top of the bottle and shake it gently to dissolve the mixture.  ? If you are making your own solution:   - Add 1/4 to 1/2 teaspoon of baking soda and 1/8 teaspoon of kosher (non-iodine) salt   into the bottle.   - Tighten the top of the bottle.   - Shake the bottle gently to dissolve the mixture.  Step 3. Get into position  ?  front of the sink.  ? Unless you were instructed to use another position, bend forward.   Then tilt your face down about 45 degrees so that you are looking   down into the sink.  ? Gently place the spout of the saline bottle against 1 of your nostrils.  [facility]  CARE AND TREATMENT  Patient Education  ©2018 [employer]  ©2018 [employer]  Step 4. Rinse  ? Breathing through your mouth, gently squeeze the   bottle. This will squirt the solution into your nostril. The   solution will start to drain from the other nostril. Some   may drain from your mouth. This is normal.  ? Use 2 ounces (half of the bottle) on each nostril.  ? Afterwards, you may need to blow your nose gently to   help drain any solution that is left behind.  Step 5. Repeat  ? Repeat steps 3 and 4 with the other nostril.  You can watch a video to learn how to do nasal saline irrigation. Go to [email or web address] and   search for NeilMed Sinus Rinse.  Step 6.  Clean the bottle and cap. Air dry the Sinus Rinse bottle, cap, and tube on a clean paper towel, a lint free towel, or use NeilMed® NasaDOCK® or NasaDOCK plus (sold separately) to store the bottle, cap and tube.  Please read Warnings before using.  Our recommendation is to replace the bottle every three  months.      NEILMED CLEAING INSTRUCTIONS    It is very important to keep these devices clean and free from any contamination. Replace the bottle every 3 months.  NasaDock Plus  NasaDock NeilMed® SINUS RINSE Squeeze Bottle: Please perform routine inspections of the bottle and tube for any discolorations and cracks. If there are any visual signs of deterioration or permanent color changes, please clean thoroughly. If the discolorations remain after cleansing, discard the items and purchase new ones. Please follow these instructions after each use of the product. Be sure to replace your product after three months.  Step 1: Rinse the cap, tube and bottle using running water. Fill the bottle with distilled, micro-filtered (through 0.2 micron), reverse osmosis filtered, commercially bottled or previously boiled and cooled down water at lukewarm or body temperature..  Step 2: Add a few drops of dish washing liquid or baby shampoo.  Step 3: Attach the cap and tube to the bottle; hold your finger over the opening in the cap and shake the bottle vigorously.  Step 4: Squeeze the bottle hard to allow the soapy solution to clean the interior of the tube and the cap. Empty out the bottle completely.  Step 5: Rinse the soap from the bottle, cap and tube thoroughly and place the items on a clean paper towel to dry or use the preferred NasaDOCK® or NasaDOCK plus.    The NasaDOCK® is a simple, hygienic way to dry and store the SINUS RINSE bottle, cap and tube. NasaDOCK® comes with various hanging options and is available in different colors. Our newest model also offers storage for our SINUS RINSE mixture packets. We strongly suggest using NasaDOCK® as an inexpensive, easy way to dry the cap, tube and SINUS RINSE bottle.        Cleaning:  Do not use a  to clean the inside of a bottle. While our bottle is  safe, a  will not adequately clean the SINUS RINSE bottle. The water jets in  dishwashers cannot enter the narrow neck of the bottle, and portions of the bottles interior will not be cleaned thoroughly. Additional methods of cleaning the bottle include the use of concentrated white vinegar or isopropyl alcohol (70% concentration), followed by scrubbing and rinsing as described above.       Microwave Disinfection  Clean the device with soap and water as mentioned above and shake off the excess water. Now place the bottle, cap and tube in the microwave for 40 seconds. This will disinfect the bottle, cap and tube. If the microwave has been used recently, please make sure that the inside of the microwave has cooled back down to room temperature before using it to disinfect the bottle.    NeilMed NasaFlo® Neti Pot Users:  Use the same procedure as above.    Sinugator® Cleaning Directions:  Clean the Sinugator® by running plain water and dry with a clean lint free towel and then air dry the unit by keeping it open to the air. The nasal  tip, blue reservoir and white soft tube can be disinfected by cleaning with soap and water and shaking off the excess water before placing in the home microwave for 60 seconds. Clean the entire unit with a few drops of dishwashing liquid and water every three days to keep the unit clean. As a fi nal rinse to wash off any residual soap or tap water, use either distilled, micro-filtered (through 0.2 micron filter), commercially bottled or previously boiled & cooled down water. Please make sure to rinse thoroughly during each wash so no soap is left behind. DO NOT place the white motor unit in microwave for disinfection. Because of the units stainless steel components, this can cause damage or fire hazards.    General Principles of Maintenance & Storage:  When permissible use a microwave periodically to disinfect devices. Always store NeilMed® products in a cool and dry place with adequate ventilation. NasaDOCK® or NasaDOCK plus offer a simple hygienic way  to air dry & neatly store the bottle, cap, tube and NasaFlo. Do not store the bottle with the cap screwed on, unless both are dry. Do not store the wet parts in a sealed plastic bag. If you travel before they are dry, wrap parts separately in paper towels. Hand soap or shampoo can be used for cleaning parts while away from home.        USE ONLY AS DIRECTED, IF SYMPTOMS PERSIST SEE YOUR DOCTOR/HEALTHCARE PROFESSIONAL. ALWAYS READ THE LABEL.

## 2025-01-03 NOTE — PROGRESS NOTES
[facility]    Subjective:      Patient: Leona Tyler Patient PCP: Beba, Primary Doctor         :  1980     Sex:  female      MRN:  [id number]          Date of Visit: 2025      Chief Complaint: Sinusitis (SNOT 74/110)      Patient ID: Leona Tyler is a 44 y.o. female     Patient is a  lifelong NON-smoker with a past medical history of T1DM on insulin pump referred to me by Danita Lipscomb in consultation for sinusitis.  SNOT-22 of 74.  Seen at [facility] [place].  Treated with Doxy.  CT ordered, not completed.  Intermittent purulence with chronic congestion, pressure and drainage x 2 years.  Multiple courses of antibiotics, medrol packs and steroid shots all with only transient improvement.    Labs:  WBC   Date Value Ref Range Status   10/29/2024 5.87 3.90 - 12.70 K/uL Final     Hemoglobin   Date Value Ref Range Status   10/29/2024 14.9 12.0 - 16.0 g/dL Final     Platelets   Date Value Ref Range Status   10/29/2024 544 (H) 150 - 450 K/uL Final     Creatinine   Date Value Ref Range Status   10/29/2024 1.1 0.5 - 1.4 mg/dL Final     TSH   Date Value Ref Range Status   10/29/2024 1.331 0.400 - 4.000 uIU/mL Final     Hemoglobin A1C   Date Value Ref Range Status   10/29/2024 7.1 (H) 4.0 - 5.6 % Final     Comment:     ADA Screening Guidelines:  5.7-6.4%  Consistent with prediabetes  >or=6.5%  Consistent with diabetes    High levels of fetal hemoglobin interfere with the HbA1C  assay. Heterozygous hemoglobin variants (HbS, HgC, etc)do  not significantly interfere with this assay.   However, presence of multiple variants may affect accuracy.         Past Medical History  She has a past medical history of Diabetes mellitus type I.    Family / Surgical / Social History  Her family history includes Breast cancer in her maternal aunt; Diabetes in her father, maternal grandmother, mother, and paternal grandmother; Ovarian cancer in her paternal aunt.    Past Surgical History:   Procedure Laterality Date    CARPAL  "TUNNEL RELEASE Right      SECTION      MYOMECTOMY         Social History     Tobacco Use    Smoking status: Never    Smokeless tobacco: Never   Substance and Sexual Activity    Alcohol use: Yes     Alcohol/week: 2.0 standard drinks of alcohol     Types: 2 Glasses of wine per week    Drug use: Never    Sexual activity: Yes     Partners: Male       Medications  She has a current medication list which includes the following prescription(s): azelastine, blood sugar diagnostic, blood-glucose meter, dexcom g6 sensor, dexcom g6 transmitter, baqsimi, insulin aspart u-100, fiasp flextouch u-100 insulin, fiasp u-100 insulin, lantus solostar u-100 insulin, insulin glargine (toujeo), omnipod 5 g6 pods (gen 5), afrezza, lancets, levocetirizine, microgestin fe .30 (28), norethindrone-ethinyl estradiol-iron, omnipod 5 g6-g7 pods (gen 5), onetouch delica plus lancet, onetouch verio flex meter, pen needle, diabetic, pravastatin, ozempic, and valacyclovir, and the following Facility-Administered Medications: etonogestrel.      Allergies  Review of patient's allergies indicates:  No Known Allergies    All medications, allergies, and past history have been reviewed.    Objective:      Vitals:      2024     9:56 AM 2024     1:33 PM 1/3/2025     3:11 PM   Vitals - 1 value per visit   SYSTOLIC 111 122 126   DIASTOLIC 73 77 82   Pulse 85  94   Weight (lb) 192.02 193.12 196.32   Weight (kg) 87.1 87.6 89.05   Height 5' 7" (1.702 m) 5' 7" (1.702 m) 5' 7" (1.702 m)   BMI (Calculated) 30.1 30.2 30.7   Pain Score Zero Zero Six       Body surface area is 2.05 meters squared.    Physical Exam:    GENERAL  APPEARANCE -  alert, appears stated age, and cooperative  BARRIER(S) TO COMMUNICATION -  none VOICE - mildly hyponasal    INTEGUMENTARY  no suspicious head and neck lesions    HEENT  HEAD: Normocephalic, without obvious abnormality, atraumatic  FACE: INSPECTION - Symmetric, no signs of trauma, no suspicious lesion(s)      " STRENGTH - facial symmetry intact     PALPATION -  No masses mild TTP    SALIVARY GLANDS - non-tender with no appreciable mass    NECK/THYROID: normal atraumatic, no neck masses, normal thyroid, no jvd    EYES  Normal occular alignment and mobility with no visible nystagmus at rest    EARS/NOSE/MOUTH/THROAT  EARS  PINNAE AND EXTERNAL EARS - no suspicious lesion OTOSCOPIC EXAM (surgical microscopy was not used for visualization/instrumentation): EAR EXAM - Normal ear canals, tympanic membranes and mobility, and middle ear spaces bilaterally.  HEARING - grossly intact to voice/finger rub    NOSE AND SINUSES  EXTERNAL NOSE - Grossly normal for age/sex  SEPTUM - normal/no obstruction on anterior exam without decongestion TURBINATES - within normal limits MUCOSA - diffuse congestion w/o edema, cyanosis or lesion.  Creamy puss on left from MM cultured.     MOUTH AND THROAT   ORAL CAVITY, LIPS, TEETH, GUMS & TONGUE - moist, no suspicious lesions  OROPHARYNX /TONSILS/PHARYNGEAL WALLS/HYPOPHARYNX - no erythema or exudates  NASOPHARYNX - limited mirror exam - unable to visualize due to anatomy/gag  LARYNX -  - limited mirror exam - unable to visualize due to anatomy/gag      CHEST AND LUNG   INSPECTION & AUSCULTATION - normal effort, no stridor    CARDIOVASCULAR  AUSCULTATION & PERIPHERAL VASCULAR - regular rate and rhythm.    NEUROLOGIC  MENTAL STATUS - alert, interactive CRANIAL NERVES - normal    LYMPHATIC  HEAD AND NECK - non-palpable; SUPRACLAVICULAR - deferred      Procedure(s):  None          Assessment:      Problem List Items Addressed This Visit    None  Visit Diagnoses       Chronic sinusitis, unspecified location    -  Primary    Recurrent sinusitis                     Plan:      Prolonged course of antibiotic (cefdinir x 3 weeks). Culture sent.    Oral steroids with Medrol dose pack ONLY if not improving with antibiotics and topical measures as below (to avoid worsening blood glucose).    CT scan in 2  weeks.    Return in 3 weeks.            Voice recognition software was used in the creation of this note/communication and any sound-alike errors which may have occurred from its use should be taken in context when interpreting.  If such errors prevent a clear understanding of the note/communication, please contact the office for clarification.   Itraconazole Pregnancy And Lactation Text: This medication is Pregnancy Category C and it isn't know if it is safe during pregnancy. It is also excreted in breast milk.

## 2025-01-06 ENCOUNTER — PATIENT MESSAGE (OUTPATIENT)
Dept: OTOLARYNGOLOGY | Facility: CLINIC | Age: 45
End: 2025-01-06
Payer: COMMERCIAL

## 2025-01-21 ENCOUNTER — CLINICAL SUPPORT (OUTPATIENT)
Dept: DIABETES | Facility: CLINIC | Age: 45
End: 2025-01-21
Payer: COMMERCIAL

## 2025-01-21 DIAGNOSIS — E10.65 TYPE 1 DIABETES MELLITUS WITH HYPERGLYCEMIA: ICD-10-CM

## 2025-01-21 PROCEDURE — G0108 DIAB MANAGE TRN  PER INDIV: HCPCS | Mod: 95,,, | Performed by: DIETITIAN, REGISTERED

## 2025-01-23 NOTE — PROGRESS NOTES
Diabetes Care Specialist Follow-up Note  Author: Kellee Javier RD, CDE  Date: 1/22/2025  Diabetes Care Specialist Virtual Visit Note       The patient location is: at home in [place]  The chief complaint leading to consultation is: Diabetes  Visit type: audiovisual  Total time spent with patient: 90 min   Each patient to whom he or she provides medical services by telemedicine is:  (1) informed of the relationship between the physician and patient and the respective role of any other health care provider with respect to management of the patient; and (2) notified that he or she may decline to receive medical services by telemedicine and may withdraw from such care at any time.  Intake    Program Intake  Reason for Diabetes Program Visit:: Intervention  Type of Intervention:: Individual  Individual: Device Training  Device Training: Insulin Pump Start  Current diabetes risk level:: low (HgbA1c 7.8)  In the last month, have you used the ER or been admitted to the hospital: No  Permission to speak with others about care:: no    Current Diabetes Treatment: Insulin, DM Injectables  DM Injectables Type/Dose: ozempic 1mg a week  Method of insulin delivery?: Insulin Pump (afrezza also)  Type of Pump: omnipod 5 - switching to the ilet, beta bionics  Does patient have back-up plan?: Yes (afrezza inhaled insulin, toujeo once a day, and fiasp)  Any problems obtaining supplies?: Yes    Continuous Glucose Monitoring  Patient has CGM: Yes  Personal CGM type:: Dexcom G7  GMI Date: 01/21/25  GMI Value: 7.2 %    Lab Results   Component Value Date    HGBA1C 7.1 (H) 10/29/2024     A1c Pre Diabetes Care Specialist Intervention:  7.8%    Physical activity/Exercise:   No change    SMBG: using the dexcom                    Lifestyle Coping Support & Clinical    Lifestyle/Coping/Support  Compared to other people your age, how would you rate your health?: Good  Does anyone in your family have diabetes or does anyone in your family support  you in your diabetes care?: FH: mother (T2DM)  and father (T1)  How do you deal with stress/distress?: talking about it  Learning Barriers:: None  Culture or Cheondoism beliefs that may impact ability to access healthcare: No  Psychosocial/Coping Skills Assessment Completed: : Yes  Assessment indicates:: Adequate understanding  Area of need?: No    Problem Review  Active Comorbidities: Other (comment), Obesity (PCOS)    Diabetes Self-Management Skills Assessment    Medication Skills Assessment  Patient is able to identify current diabetes medications, dosages, and appropriate timing of medications.: yes  Patient reports problems or concerns with current medication regimen.: yes  Medication regimen problems/concerns:: other (see comments) (training on the omnipod 5)  Patient is  aware that some diabetes medications can cause low blood sugar?: Yes  Medication Skills Assessment Completed:: Yes  Assessment indicates:: Instruction Needed, Knowledge deficit  Area of need?: Yes    Diabetes Disease Process/Treatment Options  Diabetes Type?: Type I  When were you diagnosed?: age 9  If previous diabetes education, when/where:: multiple times, last time was about a year ago to start the omnipod 5  What are your goals for this education session?: start the ilet beta bionic pump  Is patient aware of what causes diabetes?: Yes  Does patient understand the pathophysiology of diabetes?: Yes  Diabetes Disease Process/Treatment Options: Skills Assessment Completed: Yes  Assessment indicates:: Adequate understanding  Deferred due to:: Other (comment) (previously completed, there has been no change and patient has adequate understanding)  Area of need?: No    Nutrition/Healthy Eating  Nutrition/Healthy Eating Skills Assessment Completed:: No  Assessment indicates:: Adequate understanding  Deffered due to:: Other (comment) (previously completed, there has been no change and patient has adequate understanding)  Area of need?:  No    Physical Activity/Exercise  Patient's daily activity level:: lightly active  Patient formally exercises outside of work.: no  Reasons for not exercising:: time constraints  Patient can identify forms of physical activity.: yes  Physical Activity/Exercise Skills Assessment Completed: : No  Assessment indicates:: Adequate understanding  Deffered due to:: Other (comment)  Area of need?: No    Home Blood Glucose Monitoring  Patient states that blood sugar is checked at home daily.: yes  Monitoring Method:: personal continuous glucose monitor  Personal CGM type:: Dexcom G7   What is your current Time in Range?: 63%  What is your A1c Target?: 7.0  Home Blood Glucose Monitoring Skills Assessment Completed: : Yes  Assessment indicates:: Adequate understanding  Deferred due to:: Other (comment) (previously completed, there has been no change and patient has adequate understanding)  Area of need?: No    Acute Complications  Acute Complications Skills Assessment Completed: : No  Assessment indicates:: Adequate understanding  Deffered due to:: Other (comment) (previously completed, there has been no change and patient has adequate understanding)  Area of need?: No    Chronic Complications  Reviewed health maintenance: yes  Have you completed your annual diabetes maintenance labwork? : yes  Do you examine your feet daily?: yes  Has your doctor examined your feet?: yes  Do you see a Dentist?: yes  Do you see an eye doctor?: yes  Eye doctor date of last visit:: will be due soon  Chronic Complications Skills Assessment Completed: : Yes  Assessment indicates:: Adequate understanding  Area of need?: No      During today's follow-up visit,  the following areas required further assessment and content was provided/reviewed.    Based on today's diabetes care assessment, the following areas of need were identified:      Identified Areas of Need      Medication/Current Diabetes Treatment: Yes iLet INSULIN PUMP START    Patient is  here today for insulin pump training and will be starting on the iLet Insulin pump.  iLet serial number: [id number]    Current insulin regime: omnipod 5  Instructed to stop current regime once insulin pump is started.    Currently using Dexcom G6/Dexcom G7. Patient is new to insulin pump therapy.   Details of pump therapy were covered with pt to include basic features of the iLet pump:  The Home Screen, Status Bar and Menu Screen, time/date set up.  Insulin Cartridge, Notifications, Glucose Status, Meal Announcement  How the pump will adjust insulin delivery based on Dexcom CGM  Advised to review user guide enclosed in shipment.  Set up with the iLet Mobile benjie, account set up with username and password.  Pairing the iLet and CGM sensor and syncing with the iLet Mobile benjie  Connecting the iLet to a CGM sensor session  Advised that the CGM sensor session will need to be initiated on the pump itself, can continue using the phone benjie only.  Following the same procedure as the phone benjie. Will be able to use the phone benjie and will need to connect after initiating with pump. Will not be able to use the Dexcom .   BG run mode explained in the event of no Dexcom sensor.  Advised that if you are out of Dexcom sensors and if it is less than 7 days will stay in BG-run mode for 48 hours  If iLet system has been used for 7 days or more, BG-run mode can be used for 72 hours.  After that time, will need to come off pump and resume pre pump regime.     Instructed how to fill cartridge, attach iLet connect and attaching to infusion set.  Practice steps with patient.  Demonstrated how to insert infusion site after priming set.  Instructed how to insert inset per user instructions.  Current weight in pounds entered pump.    Meal Announcements:  Patient was instructed to announce meals into the pump using usual for me for Breakfast, Lunch and Dinner. Patient was instructed only to use meal announcement when consuming food.  Do  not use to correct elevated glucose.     Troubleshooting of infusion site, pump, alerts discussed and what to do.  Back up plan discussed in the event of pump malfunction or Dexcom supply issue. Advised to call Dexcom and iLet customer support number 24/7.  Numbers provided.     Patient scheduled for f/u visit in 1-2 weeks and instructed to reach out for additional questions.     Lifestyle Coping/Support: No   Diabetes Disease Process/Treatment Options: No   Nutrition/Healthy Eating: No    Physical Activity/Exercise: No    Home Blood Glucose Monitoring: No    Acute Complications: No    Chronic Complications: No       Today's interventions were provided through individual discussion, instruction, and written materials were provided.    Patient verbalized understanding of instruction and written materials.  Pt was able to return back demonstration of instructions today. Patient understood key points, needs reinforcement and further instruction.     Diabetes Self-Management Care Plan Review and Evaluation of Progress:    During today's follow-up Leona's Diabetes Self-Management Care Plan progress was reviewed and progress was evaluated including his/her input. Leona has agreed to continue his/her journey to improve/maintain overall diabetes control by continuing to set health goals. See care plan progress below.      Care Plan: Diabetes Management   Updates made since 1/23/2024 12:00 AM        Problem: Medications         Goal: Patient Agrees to take Diabetes Medication(s) fiasp via ilet insulin delivery system as prescribed for the next 3 months.    Start Date: 1/21/2025   Expected End Date: 4/22/2025   This Visit's Progress: Deferred   Recent Progress: Deferred   Priority: High   Barriers: No Barriers Identified        Task: Reviewed with patient all current diabetes medications and provided basic review of the purpose, dosage, frequency, side effects, and storage of both oral and injectable diabetes medications.  Completed 3/18/2024        Task: Explained smart adjust Technology Completed 3/18/2024        Task: patient demonstrated ability to program pump, activate and insert infusion set using aseptic technique. Completed 3/18/2024        Task: Reviewed and reconciled current medication list today. Completed 3/18/2024        Task: Instructed pt on use of basic pump features ie...give a bolus, pause insulin, switch from manual to automated mode. Completed 3/18/2024        Task: Reviewed features available in manual mode verses automated mode. Completed 3/18/2024        Task: Reviewed when and how to use activity function in automated mode. Completed 3/18/2024        Task: Reviewed site selection of infusion set and rotation of sites. Completed 3/18/2024        Task: Instructed that insulin vial is good out of refrigeration for up to 28-30 days. Completed 3/18/2024        Task: reviewed appropriate injection site selection and importance of rotating sites. Completed 3/18/2024        Task: Advised to carry back-up supplies with them and discussed steps to take in case of connection loss. Completed 3/18/2024        Task: Discussed guidelines for preventing, detecting and treating hypoglycemia and hyperglycemia and reviewed the importance of meal and medication timing with diabetes mediations for prevention of hypoglycemia and maximum drug benefit. Completed 3/18/2024        Task: Details of pump therapy were covered including pump/benjie features and programming Completed 3/18/2024        Task: Reviewed setting & editing basal rates in manual mode, giving bolus and other features in the set-up menu. Completed 3/18/2024        Task: Omnipod 24-hour support line provided. Completed 3/18/2024        Task: Patient provided with insulet and [employer] usernames and passwords, also documented in chart note and blue sticky note in Epic Completed 3/18/2024        Task: Setup ilet beta bionics pump Completed 1/22/2025        Task: Beta Bionics  and Dexcom data was downloaded and reviewed with patient and provider, any necessary adjustments were made         Task: Omnipod and Dexcom data was downloaded and reviewed with patient and provider, any necessary adjustments were made Completed 1/22/2025          Follow Up Plan     Follow up in about 1 week (around 1/28/2025) for Insulin Pump Upload, Personal CGM Upload.    Today's care plan and follow up schedule was discussed with patient.  Leona verbalized understanding of the care plan, goals, and agrees to follow up plan.        The patient was encouraged to communicate with his/her health care provider/physician and care team regarding his/her condition(s) and treatment.  I provided the patient with my contact information today and encouraged to contact me via phone or [facility] Portal as needed.     Length of Visit   Total Time: 90 Minutes

## 2025-01-24 ENCOUNTER — TELEPHONE (OUTPATIENT)
Dept: ALLERGY | Facility: CLINIC | Age: 45
End: 2025-01-24
Payer: COMMERCIAL

## 2025-01-31 ENCOUNTER — OFFICE VISIT (OUTPATIENT)
Dept: OTOLARYNGOLOGY | Facility: CLINIC | Age: 45
End: 2025-01-31
Payer: COMMERCIAL

## 2025-01-31 VITALS
DIASTOLIC BLOOD PRESSURE: 79 MMHG | SYSTOLIC BLOOD PRESSURE: 114 MMHG | HEART RATE: 94 BPM | HEIGHT: 67 IN | WEIGHT: 191.5 LBS | BODY MASS INDEX: 30.06 KG/M2

## 2025-01-31 DIAGNOSIS — J32.9 RECURRENT SINUSITIS: Primary | ICD-10-CM

## 2025-01-31 DIAGNOSIS — J34.3 NASAL TURBINATE HYPERTROPHY: ICD-10-CM

## 2025-01-31 PROCEDURE — 99999 PR PBB SHADOW E&M-EST. PATIENT-LVL III: CPT | Mod: PBBFAC,,, | Performed by: OTOLARYNGOLOGY

## 2025-01-31 PROCEDURE — 3078F DIAST BP <80 MM HG: CPT | Mod: CPTII,S$GLB,, | Performed by: OTOLARYNGOLOGY

## 2025-01-31 PROCEDURE — 3074F SYST BP LT 130 MM HG: CPT | Mod: CPTII,S$GLB,, | Performed by: OTOLARYNGOLOGY

## 2025-01-31 PROCEDURE — 3008F BODY MASS INDEX DOCD: CPT | Mod: CPTII,S$GLB,, | Performed by: OTOLARYNGOLOGY

## 2025-01-31 PROCEDURE — 99214 OFFICE O/P EST MOD 30 MIN: CPT | Mod: S$GLB,,, | Performed by: OTOLARYNGOLOGY

## 2025-01-31 NOTE — Clinical Note
Seeing you Monday.  Reports negative immunocap outside [facility] in past.  Has h/o recurrent acute sinusitis.  ?immunologic workup/pneumoccal Ab's and vax?  Thanks. - Harman

## 2025-01-31 NOTE — PROGRESS NOTES
[facility]    Subjective:      Patient: Leona Tyler Patient PCP: Beba, Primary Doctor         :  1980     Sex:  female      MRN:  [id number]          Date of Visit: 2025      Chief Complaint: Follow-up (SNOT 41/110/CT)      Patient ID: Leona Tyler is a 44 y.o. female       2025 4 week follow up visit patient seen 4 weeks ago with the acute on chronic sinus symptoms treated with three-week of cefdinir and a Medrol Dosepak if necessary with CT scanning completed just 3 days ago reviewed today shows some enlargement of the inferior turbinate and nasal septal swell body, narrowing of the ostiomeatal complex but no significant mucoperiosteal thickening, any destructive process, or fluid levels consistent with active sinus disease.  Snot 22 score has improved from 74 down to 41.    Patient felt more improvement with the methylprednisolone which he actually started at the beginning of her three-week of cefdinir.  This was completed only a few days ago in in the last week he has already started to feel some increasing degree of runny nose postnasal drip and congestion.  No active facial pressure or any purulent drainage which seems to be the Hallmark of her progression to antibiotic treated sinusitis.  Seeing Dr. Small with allergy/immunology at [facility] on Monday.  No known prior pneumococcal vaccination.      2025 high-resolution CT sinuses images reviewed as above.         2025 initial patient consultation Patient is a  lifelong NON-smoker with a past medical history of T1DM on insulin pump referred to me by Danita Lipscomb in consultation for sinusitis.  SNOT-22 of 74.  Seen at [facility] last year.  Treated with Doxy.  CT ordered, not completed.  Intermittent purulence with chronic congestion, pressure and drainage x 2 years.  Multiple courses of antibiotics, medrol packs and steroid shots all with only transient improvement.    Labs:  WBC   Date Value Ref Range Status   10/29/2024  5.87 3.90 - 12.70 K/uL Final     Hemoglobin   Date Value Ref Range Status   10/29/2024 14.9 12.0 - 16.0 g/dL Final     Platelets   Date Value Ref Range Status   10/29/2024 544 (H) 150 - 450 K/uL Final     Creatinine   Date Value Ref Range Status   10/29/2024 1.1 0.5 - 1.4 mg/dL Final     TSH   Date Value Ref Range Status   10/29/2024 1.331 0.400 - 4.000 uIU/mL Final     Hemoglobin A1C   Date Value Ref Range Status   10/29/2024 7.1 (H) 4.0 - 5.6 % Final     Comment:     ADA Screening Guidelines:  5.7-6.4%  Consistent with prediabetes  >or=6.5%  Consistent with diabetes    High levels of fetal hemoglobin interfere with the HbA1C  assay. Heterozygous hemoglobin variants (HbS, HgC, etc)do  not significantly interfere with this assay.   However, presence of multiple variants may affect accuracy.         Past Medical History  She has a past medical history of Diabetes mellitus type I.    Family / Surgical / Social History  Her family history includes Breast cancer in her maternal aunt; Diabetes in her father, maternal grandmother, mother, and paternal grandmother; Ovarian cancer in her paternal aunt.    Past Surgical History:   Procedure Laterality Date    CARPAL TUNNEL RELEASE Right      SECTION      MYOMECTOMY         Social History     Tobacco Use    Smoking status: Never    Smokeless tobacco: Never   Substance and Sexual Activity    Alcohol use: Yes     Alcohol/week: 2.0 standard drinks of alcohol     Types: 2 Glasses of wine per week    Drug use: Never    Sexual activity: Yes     Partners: Male       Medications  She has a current medication list which includes the following prescription(s): azelastine, blood sugar diagnostic, blood-glucose meter, dexcom g6 sensor, dexcom g6 transmitter, baqsimi, insulin aspart u-100, fiasp flextouch u-100 insulin, fiasp u-100 insulin, lantus solostar u-100 insulin, insulin glargine (toujeo), omnipod 5 g6 pods (gen 5), lancets, levocetirizine, methylprednisolone,  "norethindrone-ethinyl estradiol-iron, omnipod 5 g6-g7 pods (gen 5), onetouch delica plus lancet, onetouch verio flex meter, pen needle, diabetic, pravastatin, ozempic, and valacyclovir, and the following Facility-Administered Medications: etonogestrel.      Allergies  Review of patient's allergies indicates:  No Known Allergies    All medications, allergies, and past history have been reviewed.    Objective:      Vitals:      11/19/2024     1:33 PM 1/3/2025     3:11 PM 1/31/2025     3:15 PM   Vitals - 1 value per visit   SYSTOLIC 122 126 114   DIASTOLIC 77 82 79   Pulse  94 94   Weight (lb) 193.12 196.32 191.47   Weight (kg) 87.6 89.05 86.85   Height 5' 7" (1.702 m) 5' 7" (1.702 m) 5' 7" (1.702 m)   BMI (Calculated) 30.2 30.7 30   Pain Score Zero Six Five       Body surface area is 2.03 meters squared.    Physical Exam:    GENERAL  APPEARANCE -  alert, appears stated age, and cooperative  BARRIER(S) TO COMMUNICATION -  none VOICE - normal    INTEGUMENTARY  no suspicious head and neck lesions    HEENT  HEAD: Normocephalic, without obvious abnormality, atraumatic  FACE: INSPECTION - Symmetric, no signs of trauma, no suspicious lesion(s)      STRENGTH - facial symmetry intact     PALPATION -  No masses     SALIVARY GLANDS - non-tender with no appreciable mass    NECK/THYROID: normal atraumatic, no neck masses, normal thyroid, no jvd    EYES  Normal occular alignment and mobility with no visible nystagmus at rest    EARS/NOSE/MOUTH/THROAT  EARS  PINNAE AND EXTERNAL EARS - no suspicious lesion OTOSCOPIC EXAM (surgical microscopy was not used for visualization/instrumentation): EAR EXAM - Normal ear canals, tympanic membranes and mobility, and middle ear spaces bilaterally.  HEARING - grossly intact to voice/finger rub    NOSE AND SINUSES  EXTERNAL NOSE - Grossly normal for age/sex  SEPTUM - normal/no obstruction on anterior exam without decongestion TURBINATES  / MUCOSA - mild anterior congestion w/o edema, cyanosis or " lesion.  No gross purulence.  2-3+ IT hypertrophy    MOUTH AND THROAT   ORAL CAVITY, LIPS, TEETH, GUMS & TONGUE - moist, no suspicious lesions  OROPHARYNX /TONSILS/PHARYNGEAL WALLS/HYPOPHARYNX - no erythema or exudates  NASOPHARYNX - limited mirror exam - unable to visualize due to anatomy/gag  LARYNX -  - limited mirror exam - unable to visualize due to anatomy/gag      CHEST AND LUNG   INSPECTION & AUSCULTATION - normal effort, no stridor    CARDIOVASCULAR  AUSCULTATION & PERIPHERAL VASCULAR - regular rate and rhythm.    NEUROLOGIC  MENTAL STATUS - alert, interactive CRANIAL NERVES - normal    LYMPHATIC  HEAD AND NECK - non-palpable; SUPRACLAVICULAR - deferred      Procedure(s):  None          Assessment:      Problem List Items Addressed This Visit    None  Visit Diagnoses       Recurrent sinusitis    -  Primary    Nasal turbinate hypertrophy                       Plan:      Reports a prior in-vitro allergy testing negative from [facility].  Patient is to see Allergy immunology with Dr. Small at [facility] on Monday.  Patient instructions as below.  Sinus surgery not recommended at this time for quit recurrent acute sinusitis but some degree of sinus surgery maybe an appropriate next step depending upon her clinical course in spite of normal CT sinuses.    Instructed to:    Continue with healthy living / eating with good hand hygiene to try and avoid recurrent viral infections.  Follow up with Allergy/immunology on Monday as planned and be sure to discuss not just allergy but the possibility of immuno compromised/immunodeficient state including laboratories.  Discuss pneumococcal vaccination and antibody testing and any other testing and treatments recommended.      Balloon dilation sinuplasty or more definitive tissue removal FEES type sinus surgery may prove necessary especially with documentation of immuno compromise to allow for better delivery of topical medications to treat recurrent sinus inflammation and  infection.  Surgery is not recommended at this time.      Contact the office for an E visit when notified in 4 weeks.  We will plan for a repeat snot 22 score and discussion of symptoms and testing results with allergy/immunology.      Scheduled to be seen in the [place] or [facility] office with the next sinus infection that is otherwise going to be treated with antibiotics.  Try not to take the antibiotics and instead come into the office for decongested endoscopic nasal examination and cultures.    Return with any worsening of symptoms, failure to improve, or any other concerns for further evaluation and treatment.        Voice recognition software was used in the creation of this note/communication and any sound-alike errors which may have occurred from its use should be taken in context when interpreting.  If such errors prevent a clear understanding of the note/communication, please contact the office for clarification.

## 2025-01-31 NOTE — PATIENT INSTRUCTIONS
Continue with healthy living / eating with good hand hygiene to try and avoid recurrent viral infections.  Follow up with Allergy/immunology on Monday as planned and be sure to discuss not just allergy but the possibility of immuno compromised/immunodeficient state including laboratories.  Discuss pneumococcal vaccination and antibody testing and any other testing and treatments recommended.      Balloon dilation sinuplasty or more definitive tissue removal FEES type sinus surgery may prove necessary especially with documentation of immuno compromise to allow for better delivery of topical medications to treat recurrent sinus inflammation and infection.  Surgery is not recommended at this time.      Contact the office for an E visit when notified in 4 weeks.  We will plan for a repeat snot 22 score and discussion of symptoms and testing results with allergy/immunology.      Scheduled to be seen in the [place] or [place] [facility] office with the next sinus infection that is otherwise going to be treated with antibiotics.  Try not to take the antibiotics and instead come into the office for decongested endoscopic nasal examination and cultures.    Return with any worsening of symptoms, failure to improve, or any other concerns for further evaluation and treatment.      Voice recognition software was used in the creation of this note/communication and any sound-alike errors which may have occurred from its use should be taken in context when interpreting.  If such errors prevent a clear understanding of the note/communication, please contact the office for clarification.          NASAL SALINE    Still saline comes in many preparations including sprays/mists, gels, and rinses.  Different preparations served different purposes.  Saline spray helps to briefly moisturize the nose and help clear mucus.  Saline gels coat the nose for longer protective benefit of keeping the linings the nose moist.  Saline rinses clear the  nose and sinuses and a more thorough way in her best used for significant postnasal drip and sinus complaints.  A combination of saline sprays/mists, gels and rinses should be used to address routine nasal clearing and dryness issues as well as flushing for better control of allergy and postnasal drip symptoms.  There is no real risk of over use of nasal saline products.  Saline sprays do not have any of the potential rebound or addiction of nasal decongestant sprays.  Nasal saline sprays and rinses should be used prior to the application of any medicated nasal sprays such as nasal steroids or nasal antihistamine sprays.        INTRANASAL STEROID SPRAYS      Intranasal steroid sprays are available both by prescription and over-the-counter both in generic and brand name preparations.  They are all very similar in efficacy and side effect profiles.  Over-the-counter and prescription intranasal steroids include fluticasone propionate (Flonase), fluticasone furoate (Sensimist), triamcinolone (Nasacort), and budesonide (Rhinocort).  While these medications are available as prescriptions as well there are few nasal steroids in her available by prescription only and include mometasone (Nasonex), flunisolide (Nasarel), and beclomethasone (QNASL).    Nasal steroids or the foundation of treatment of both allergy and other inflammatory conditions of the nose and sinuses.  They are safe for regular use and while there are many side effects listed most of these are steroid class effects and not typically encountered.  Typical side effects include dryness and even ulceration and bleeding of the nose.  These side effects can be minimized by proper application and proper moisturization with saline and saline gel.    Sometimes changing between 1 brand of nasal steroid and another can result in improved control of symptoms especially after long term use of one particular nasal steroid.    Proper application of the nasal steroid  spray is accomplished by spraying towards the I/ear on the same side with the tip of the superior just barely inside the nostril with the chin slightly downward.  Any dripping should be gently inhaled not sniff test backwards into the throat.  Labeled instructions should be followed.        ASTELIN (Azelastine) nasal spray    Astelin is a topical nasal antihistamine which can be of additional benefit in controlling nasal allergy and postnasal drip.  Typically is recommended on an as-needed basis 1-2 sprays each nostril twice daily.  People often find it beneficial at night.  This typically added to her regimen of saline and nasal steroids as a 3rd line agent for as needed use.  Excessive use can cause excessive dryness and even nose bleeds.  It has a very strong taste which many people find intolerable.  Astelin needs to be stopped 5-7 days prior to any skin allergy testing just like oral antihistamines as it will inhibit the skin response.      SINUS RINSE INSTRUCTIONS    Nasal Saline Irrigation Instructions  You can wash your nasal and sinus passages using nasal saline (salt water) irrigation. This   is simple and effective. Follow the instructions below, as well as the ones provided by your   physician.  Supplies  First, you will need a nasal saline irrigation bottle and rinse solution.   You can purchase nasal rinse kits that include these items (such as   NeilMed®, Ayr®, Simply Saline®, Ocean Complete®) at most drug   stores. You can also make your own saline irrigation solution by   adding kosher (non-iodine) salt and baking soda to distilled water.   Your physician may tell you to add medications like a steroid or   antibiotic to the rinse as needed.  Steps for nasal irrigation  Step 1. Fill the bottle  ? Wash your hands.  ? Fill the irrigation bottle with lukewarm distilled water or boiled water that has cooled.  Step 2. Mix the solution  ? Put the saline and salt packet contents into the bottle.  ? Tighten  the top of the bottle and shake it gently to dissolve the mixture.  ? If you are making your own solution:   - Add 1/4 to 1/2 teaspoon of baking soda and 1/8 teaspoon of kosher (non-iodine) salt   into the bottle.   - Tighten the top of the bottle.   - Shake the bottle gently to dissolve the mixture.  Step 3. Get into position  ?  front of the sink.  ? Unless you were instructed to use another position, bend forward.   Then tilt your face down about 45 degrees so that you are looking   down into the sink.  ? Gently place the spout of the saline bottle against 1 of your nostrils.  [facility]  CARE AND TREATMENT  Patient Education  ©2018 [employer]  ©2018 [employer]  Step 4. Rinse  ? Breathing through your mouth, gently squeeze the   bottle. This will squirt the solution into your nostril. The   solution will start to drain from the other nostril. Some   may drain from your mouth. This is normal.  ? Use 2 ounces (half of the bottle) on each nostril.  ? Afterwards, you may need to blow your nose gently to   help drain any solution that is left behind.  Step 5. Repeat  ? Repeat steps 3 and 4 with the other nostril.  You can watch a video to learn how to do nasal saline irrigation. Go to [email or web address] and   search for NeilMed Sinus Rinse.  Step 6.  Clean the bottle and cap. Air dry the Sinus Rinse bottle, cap, and tube on a clean paper towel, a lint free towel, or use NeilMed® NasaDOCK® or NasaDOCK plus (sold separately) to store the bottle, cap and tube.  Please read Warnings before using.  Our recommendation is to replace the bottle every three months.      NEILMED KALPESH INSTRUCTIONS    It is very important to keep these devices clean and free from any contamination. Replace the bottle every 3 months.  NasaDock Plus  NasaDock NeilMed® SINUS RINSE Squeeze Bottle: Please perform routine inspections of the bottle and tube for any discolorations and  cracks. If there are any visual signs of deterioration or permanent color changes, please clean thoroughly. If the discolorations remain after cleansing, discard the items and purchase new ones. Please follow these instructions after each use of the product. Be sure to replace your product after three months.  Step 1: Rinse the cap, tube and bottle using running water. Fill the bottle with distilled, micro-filtered (through 0.2 micron), reverse osmosis filtered, commercially bottled or previously boiled and cooled down water at lukewarm or body temperature..  Step 2: Add a few drops of dish washing liquid or baby shampoo.  Step 3: Attach the cap and tube to the bottle; hold your finger over the opening in the cap and shake the bottle vigorously.  Step 4: Squeeze the bottle hard to allow the soapy solution to clean the interior of the tube and the cap. Empty out the bottle completely.  Step 5: Rinse the soap from the bottle, cap and tube thoroughly and place the items on a clean paper towel to dry or use the preferred NasaDOCK® or NasaDOCK plus.    The NasaDOCK® is a simple, hygienic way to dry and store the SINUS RINSE bottle, cap and tube. NasaDOCK® comes with various hanging options and is available in different colors. Our newest model also offers storage for our SINUS RINSE mixture packets. We strongly suggest using NasaDOCK® as an inexpensive, easy way to dry the cap, tube and SINUS RINSE bottle.        Cleaning:  Do not use a  to clean the inside of a bottle. While our bottle is  safe, a  will not adequately clean the SINUS RINSE bottle. The water jets in dishwashers cannot enter the narrow neck of the bottle, and portions of the bottles interior will not be cleaned thoroughly. Additional methods of cleaning the bottle include the use of concentrated white vinegar or isopropyl alcohol (70% concentration), followed by scrubbing and rinsing as described above.        Microwave Disinfection  Clean the device with soap and water as mentioned above and shake off the excess water. Now place the bottle, cap and tube in the microwave for 40 seconds. This will disinfect the bottle, cap and tube. If the microwave has been used recently, please make sure that the inside of the microwave has cooled back down to room temperature before using it to disinfect the bottle.    NeilMed NasaFlo® Neti Pot Users:  Use the same procedure as above.    Sinugator® Cleaning Directions:  Clean the Sinugator® by running plain water and dry with a clean lint free towel and then air dry the unit by keeping it open to the air. The nasal  tip, blue reservoir and white soft tube can be disinfected by cleaning with soap and water and shaking off the excess water before placing in the home microwave for 60 seconds. Clean the entire unit with a few drops of dishwashing liquid and water every three days to keep the unit clean. As a fi nal rinse to wash off any residual soap or tap water, use either distilled, micro-filtered (through 0.2 micron filter), commercially bottled or previously boiled & cooled down water. Please make sure to rinse thoroughly during each wash so no soap is left behind. DO NOT place the white motor unit in microwave for disinfection. Because of the units stainless steel components, this can cause damage or fire hazards.    General Principles of Maintenance & Storage:  When permissible use a microwave periodically to disinfect devices. Always store NeilMed® products in a cool and dry place with adequate ventilation. NasaDOCK® or NasaDOCK plus offer a simple hygienic way to air dry & neatly store the bottle, cap, tube and NasaFlo. Do not store the bottle with the cap screwed on, unless both are dry. Do not store the wet parts in a sealed plastic bag. If you travel before they are dry, wrap parts separately in paper towels. Hand soap or shampoo can be used for cleaning parts while  away from home.        USE ONLY AS DIRECTED, IF SYMPTOMS PERSIST SEE YOUR DOCTOR/HEALTHCARE PROFESSIONAL. ALWAYS READ THE LABEL.      Why and How To Start an Anti-Inflammatory Diet    From [facility] February 2, 2022 / Nutrition    The foods you eat (and avoid) impact inflammation inside of your body  CareTreeTWITTERLINKEDINPINTERESTEmailinflammation, anti-inflammatory diet, Mediterranean diet  You cut your finger, and it becomes red and inflamed. You hurt your knee, and it gets swollen and inflamed. But what, exactly, does inflammation mean for your insides?    A little bit of inflammation contributes to healing, but when inflammation becomes chronic, it can trigger disease processes. Chronic inflammation can damage your heart, brain and other organs, and it plays a role in nearly every major illness, including cancer, heart disease, Alzheimers disease and depression.    Just like inflammation happens after an injury, that same process can happen within your body, says registered dietitian Yesenia Kate, RD, LD. Certain foods and health conditions can cause inflammation.    But the food we eat -- and dont eat -- can soothe and even prevent that inflammation. Lavinia explains the health benefits of an anti-inflammatory diet, as well as where to start, what to stop eating and how to tell if its working.    Who should try an anti-inflammatory diet?  Everyones inflammatory triggers are different, so there are a few reasons you might experience inflammation. Lavinia delves deeper.    Chronic illness  If youre living with a chronic health condition, you may be living with chronic inflammation, too. Conditions associated with inflammation include:    Crohns disease.  Heart disease.  High blood pressure.  Irritable bowel syndrome.  Multiple sclerosis.  Obesity.  Psoriasis.  Rheumatoid arthritis.  Type 1 diabetes.  Ulcerative colitis.  We know of some general associations, Lavinia says. Refined starches  and processed meats are not good for people with heart disease (or anyone); gluten and dairy can further inflame bowel disorders; and nightshades can be inflammatory for arthritis. But each person needs to find their personal triggers.    Food sensitivities  Even if you dont have a chronic condition, you can experience inflammation when you eat foods that youre sensitive to.    When you have an immune response to a food, your antibodies rise, which can cause inflammation, Lavinia explains. Your body basically sees that food as a foreign body and starts working against it.    But foods that cause inflammation in one person might not cause it in others -- the way, for example, that you can enjoy the occasional slice of pizza without issue, while doing so causes severe inflammation in your friend who has a gluten sensitivity.    Its not a perfect example, though! Keep in mind that although you may not experience a physical sign of inflammation, all processed foods can lead to internal inflammation. So keep them to a minimum, even if you dont have a particular sensitivity.    How do you know if youre experiencing inflammation?  This is a tricky one! You might not even realize youre experiencing this type of hidden inflammation within your body, although some physical signs can clue you in to it.    You could have redness, puffiness, skin rashes, swelling in your hands and feet, she says. You can also experience abdominal distention, when your pants feel tight around your waist.    Other clues can include fatigue, weight gain, achy joints and muscles, headaches and gastrointestinal issues. You may also be more prone to getting colds and the flu, and when you do get them, they may linger for weeks.    How to start an anti-inflammatory diet  The foods you eat (and the ones you avoid) can help soothe and even prevent inflammation by quashing your bodys inflammatory responses. But because everyones  inflammatory triggers are different, theres no one-size-fits-all anti-inflammatory diet.    The term anti-inflammatory diet doesnt refer to a specific diet regimen but to an overall style of eating, Lavinia says. There are, however, some guidelines to follow to eat in a way that reduces the likelihood of inflammation.    1. Scale way back on processed foods  The first key to minimizing inflammation is cutting foods that cause it. An anti-inflammatory diet is one that includes minimally processed foods, Lavinia says. That typically means staying away from anything that comes in a box or a bag, or anything that has a laundry list of ingredients -- especially if they start with sugar, salt or a processed oil and include ingredients you dont recognize.    Examples include:    Sweets, like commercial baked goods, pre-packaged desserts, ice cream and candy.  Snack foods, like potato chips and microwave popcorn.  Processed meats, including franco, sausage, hot dogs, bologna, pepperoni and salami.  Processed cheeses, like hema cheese dip and American cheese slices.  Sugary beverages, including soda and sports drinks.  Fried foods, like fried chicken and French fries.  Even so-called healthy snacks like granola bars, trail mix and baked chips can have a lot of processed ingredients, including added sodium and sugar.    Theres no real nutritional benefit to them, so youre not lacking anything when you cut them out, Lavinia says.    2. Focus on whole foods  When youve cut out the processed stuff, whats left? Whole foods, of course!    A whole food is a one-ingredient food, an entire entity: an apple, an orange, a cucumber, Lavinia says. In addition to fruits and vegetables, other examples include:    Brown or wild rice.  Chicken/turkey breast.  Eggs.  Fish (especially oily fish, such as salmon, tuna, herring or mackerel).  Legumes, like dried beans and peas.  Nuts and seeds.  Oats.  In short, if you can find  it in nature, its probably a whole food.    So do any processed foods make the grade? Foods that have more than one ingredient can still be made up of whole foods -- for example, store-bought hummus, dried fruit and nut snack mix or a pasta sauce, Lavinia says. The garcia is to always review the ingredient list.    That means choosing breads and pastas that are minimally processed, minimally preserved and made with whole grains.    3. Try a diet proven to reduce inflammation  Again, theres no one-size-fits-all anti-inflammatory diet. But two styles of eating have been shown to help: the Mediterranean Diet and the DASH Diet.    Positive research reports that these diets are successful in reducing inflammation, as well as cholesterol, weight, blood pressure and blood sugar, Lavinia says. She explains each of them.     The Mediterranean Diet  Thought to be the heart-healthiest of diets, the Mediterranean Diet is a style of eating popular among people who live along the [place]. A foundation of this diet is fish high in omega-3 fatty acid, which has been proven to reduce inflammation.    The Mediterranean Diet has been shown to be anti-inflammatory because of its focus on whole foods and omega-3 fatty acids, Lavinia says. It also eliminates processed oils, like cottonseed and soybean oil, which are found in many ultra-processed foods.    The DASH Diet  DASH, which stands for Dietary Approaches to Stop Hypertension, is a diet designed to reduce high blood pressure. This diet has been shown to reduce inflammation, probably because it reduces blood pressure and promotes weight loss, Lavinia notes. Remember, both high blood pressure and obesity are associated with inflammation.    Like the Mediterranean Diet, the DASH Diet focuses on whole foods and limits protein, sweets and processed foods. But DASH includes a bit more dairy, and it doesnt specifically encourage fish or extra-virgin olive oil.    Is  vegetarianism anti-inflammatory?  Though meat can be inflammatory, keep in mind that cutting animal products doesnt necessarily mean eating healthfully.    Eating a plant-based diet can help suppress inflammation, Lavinia says, but vegetarian, pescetarian and even vegan diets can still include foods like potato chips, fries and cookies. To see anti-inflammatory benefits, you have to stick to whole foods.    4. If needed, try an elimination diet  If youve cut out processed foods but still experience symptoms of inflammation, you may need to go a step further.    Finding the right anti-inflammatory diet for you is a matter of personalization and finding the foods that trigger your inflammation, Lavinia explains. The best way to start is by trying an elimination diet and slowly cutting out potential trigger foods one by one.    Food sensitivity tests can help identify which foods increase your bodys antibody response, too, which may be helpful if you cant seem to determine a culprit on your own.    Do anti-inflammatory diets work?  The biggest hint that your anti-inflammatory diet is working is if you start feeling better, so keep an eye on your symptoms and look for positive changes to your health.    There are a lot of different ways your body can react to an anti-inflammatory diet, Lavinia says. They include:    Clearer skin.  Decreased muscle or joint pain.  Decreased swelling in your hands and feet.  Fewer headaches.  Improved gastrointestinal symptoms (diarrhea, gas, nausea, stomach pain).  Improved sleep.  Less anxiety, stress and/or brain fog.  Less bloating.  Lower blood pressure.  Lower blood sugar.  More energy.  Weight loss.  How long does it take to see results?  Starting an anti-inflammatory diet isnt a magic pill. Your results will vary based on the severity of your intolerance and inflammation.    Drastic changes never lead to long-term success, so give yourself three to six months to make  diet changes and to begin to see results, Lavinia advises. Begin by making small changes that you know will be impactful, and then slowly continue to add on.    In some cases, if you significantly react to a certain food, you may see results as soon as two to three weeks after eliminating that food from your diet.    This can be very encouraging and motivating, Lavinia says. Patients often tell me that they never realized how bad they felt until they changed their diet and began to feel so much better.

## 2025-02-04 ENCOUNTER — OFFICE VISIT (OUTPATIENT)
Dept: OBSTETRICS AND GYNECOLOGY | Facility: CLINIC | Age: 45
End: 2025-02-04
Payer: COMMERCIAL

## 2025-02-04 VITALS
DIASTOLIC BLOOD PRESSURE: 82 MMHG | BODY MASS INDEX: 29.17 KG/M2 | WEIGHT: 185.88 LBS | HEIGHT: 67 IN | SYSTOLIC BLOOD PRESSURE: 114 MMHG

## 2025-02-04 DIAGNOSIS — Z97.5 NEXPLANON IN PLACE: Primary | ICD-10-CM

## 2025-02-04 PROCEDURE — 1159F MED LIST DOCD IN RCRD: CPT | Mod: CPTII,S$GLB,, | Performed by: OBSTETRICS & GYNECOLOGY

## 2025-02-04 PROCEDURE — 3074F SYST BP LT 130 MM HG: CPT | Mod: CPTII,S$GLB,, | Performed by: OBSTETRICS & GYNECOLOGY

## 2025-02-04 PROCEDURE — 99999 PR PBB SHADOW E&M-EST. PATIENT-LVL IV: CPT | Mod: PBBFAC,,, | Performed by: OBSTETRICS & GYNECOLOGY

## 2025-02-04 PROCEDURE — 99213 OFFICE O/P EST LOW 20 MIN: CPT | Mod: S$GLB,,, | Performed by: OBSTETRICS & GYNECOLOGY

## 2025-02-04 PROCEDURE — 3008F BODY MASS INDEX DOCD: CPT | Mod: CPTII,S$GLB,, | Performed by: OBSTETRICS & GYNECOLOGY

## 2025-02-04 PROCEDURE — 3079F DIAST BP 80-89 MM HG: CPT | Mod: CPTII,S$GLB,, | Performed by: OBSTETRICS & GYNECOLOGY

## 2025-02-04 RX ORDER — INSULIN INFUSION SET/CARTRIDGE
COMBINATION PACKAGE (EA) MISCELLANEOUS
COMMUNITY
Start: 2025-01-20

## 2025-02-04 RX ORDER — IBUPROFEN 800 MG/1
800 TABLET ORAL EVERY 8 HOURS PRN
Qty: 60 TABLET | Refills: 6 | Status: SHIPPED | OUTPATIENT
Start: 2025-02-04

## 2025-02-04 NOTE — PROGRESS NOTES
"Subjective     Patient ID: Leona Tyler is a 44 y.o. female.    Chief Complaint:  NEXPLANON F/U       History of Present Illness  HPI  44 y.o.  presents for f/u Nexplanon, device placed 24 due to irregular/heavy cycles.  H/o BTL.  Reports initially almost daily bleeding and cramping after insertion, however finally stopped for about 2 weeks last month.  Then started cycle on , no just spotting, bleeding getting lighter.  Denies CP/SOB/dizziness.  Bleeding affecting daily life though, including sex life.  Potentially considering ablation if this does not continue to improve on Nexplanon.  No other complaints today.  Takes ibuprofen for cramping.      GYN & OB History  Patient's last menstrual period was 2025 (exact date).   Date of Last Pap: 2023    OB History    Para Term  AB Living   2 2 0 2   3   SAB IAB Ectopic Multiple Live Births         1 3      # Outcome Date GA Lbr Pillo/2nd Weight Sex Type Anes PTL Lv   2A  19    M CS-Classical   JEFF   2B  19    M CS-Classical   JEFF   1  17    M CS-Unspec  Y JEFF       Review of Systems  Review of Systems   Constitutional:  Negative for chills and fever.   Respiratory:  Negative for shortness of breath.    Cardiovascular:  Negative for chest pain.   Gastrointestinal:  Negative for abdominal pain, constipation and diarrhea.   Genitourinary:  Positive for menstrual problem. Negative for dyspareunia, pelvic pain and vaginal discharge.   Musculoskeletal:  Negative for myalgias.   Neurological:  Negative for headaches.          Objective   Physical Exam    /82   Ht 5' 7" (1.702 m)   Wt 84.3 kg (185 lb 13.6 oz)   LMP 2025 (Exact Date)   BMI 29.11 kg/m²     Gen: NAD  Resp: Normal respiratory effort  Pelvic: deferred  Ext: normal ROM.  Nexplanon device palpable in LUE.   Psych: appropriate affect  Neuro: grossly intact         Assessment and Plan     Leona was seen today for nexplanon " f/u .    Diagnoses and all orders for this visit:    Nexplanon in place    Other orders  -     ibuprofen (ADVIL,MOTRIN) 800 MG tablet; Take 1 tablet (800 mg total) by mouth every 8 (eight) hours as needed for Pain.          Plan:  Bleeding slowly improving now, will continue to monitor.  May consider OCP taper if heavy bleeding returns.   Ibuprofen prn cramping.   May consider ablation as well, h/o BTL.   Counseling done, precautions given, all questions answered.  RTC June for annual and f/u, or prn.

## 2025-02-07 ENCOUNTER — PATIENT MESSAGE (OUTPATIENT)
Dept: OBSTETRICS AND GYNECOLOGY | Facility: CLINIC | Age: 45
End: 2025-02-07
Payer: COMMERCIAL

## 2025-02-10 ENCOUNTER — TELEPHONE (OUTPATIENT)
Dept: DIABETES | Facility: CLINIC | Age: 45
End: 2025-02-10
Payer: COMMERCIAL

## 2025-02-11 ENCOUNTER — PATIENT MESSAGE (OUTPATIENT)
Dept: DIABETES | Facility: CLINIC | Age: 45
End: 2025-02-11
Payer: COMMERCIAL

## 2025-02-11 ENCOUNTER — OFFICE VISIT (OUTPATIENT)
Dept: ORTHOPEDICS | Facility: CLINIC | Age: 45
End: 2025-02-11
Payer: COMMERCIAL

## 2025-02-11 VITALS
DIASTOLIC BLOOD PRESSURE: 69 MMHG | SYSTOLIC BLOOD PRESSURE: 101 MMHG | WEIGHT: 186.06 LBS | BODY MASS INDEX: 29.2 KG/M2 | HEART RATE: 86 BPM | HEIGHT: 67 IN

## 2025-02-11 DIAGNOSIS — G56.02 CARPAL TUNNEL SYNDROME ON LEFT: ICD-10-CM

## 2025-02-11 DIAGNOSIS — M72.0 DUPUYTREN CONTRACTURE OF RIGHT HAND: Primary | ICD-10-CM

## 2025-02-11 DIAGNOSIS — Z98.890 S/P CARPAL TUNNEL RELEASE: ICD-10-CM

## 2025-02-11 PROCEDURE — 99214 OFFICE O/P EST MOD 30 MIN: CPT | Mod: S$GLB,,,

## 2025-02-11 PROCEDURE — 3008F BODY MASS INDEX DOCD: CPT | Mod: CPTII,S$GLB,,

## 2025-02-11 PROCEDURE — 3074F SYST BP LT 130 MM HG: CPT | Mod: CPTII,S$GLB,,

## 2025-02-11 PROCEDURE — 3078F DIAST BP <80 MM HG: CPT | Mod: CPTII,S$GLB,,

## 2025-02-11 PROCEDURE — 1159F MED LIST DOCD IN RCRD: CPT | Mod: CPTII,S$GLB,,

## 2025-02-11 PROCEDURE — 99999 PR PBB SHADOW E&M-EST. PATIENT-LVL V: CPT | Mod: PBBFAC,,,

## 2025-02-11 PROCEDURE — 1160F RVW MEDS BY RX/DR IN RCRD: CPT | Mod: CPTII,S$GLB,,

## 2025-02-11 NOTE — PROGRESS NOTES
SUBJECTIVE:      Chief Complaint: right middle trigger finger    History of Present Illness:  Patient is a 44 y.o. right hand dominant female who presents today with complaints of right middle finger pain. Well known to clinic for this.  Had multiple trigger finger injections for right middle trigger finger and left right trigger finger.  Reports the left trigger finger injections help however her right middle only helped a little bit and symptoms return.  She is now having pain on the volar aspect of her right middle finger if it gets hit.  She is unable to fully place her hand on a flat surface.  She is having difficulty with gripping objects for both hands and experiences numbness at night bilaterally.  She is s/p right carpal tunnel release and right trigger thumb release in .    The patient is a/an .     Onset of symptoms/DOI was 6 months prior.     Symptoms are aggravated by movement.     Symptoms are alleviated by rest.     Symptoms consist of locking and pain.     The patient rates their pain as a 4/10.     Attempted treatment(s) and/or interventions include activity modifications, rest, rest and injections.     The patient denies any fevers, chills, N/V, D/C and presents for evaluation.       Past Medical History:   Diagnosis Date    Diabetes mellitus type I      Past Surgical History:   Procedure Laterality Date    CARPAL TUNNEL RELEASE Right      SECTION      MYOMECTOMY       Review of patient's allergies indicates:  No Known Allergies  Social History     Social History Narrative    ** Merged History Encounter **          Family History   Problem Relation Name Age of Onset    Diabetes Paternal Grandmother          type 1    Diabetes Maternal Grandmother          type 2    Diabetes Father          type 1    Diabetes Mother          type 2    Breast cancer Maternal Aunt      Ovarian cancer Paternal Aunt      Colon cancer Neg Hx           Current Outpatient Medications:      "azelastine (ASTELIN) 137 mcg (0.1 %) nasal spray, 1 spray (137 mcg total) by Nasal route 2 (two) times daily., Disp: 30 mL, Rfl: 0    blood sugar diagnostic Strp, To check BG 4 times daily, to use with insurance preferred meter, Disp: 400 each, Rfl: 3    blood-glucose meter kit, To check BG 4 times daily, to use with insurance preferred meter, Disp: 1 each, Rfl: 0    blood-glucose sensor (DEXCOM G6 SENSOR) Janny, Use as directed. Change sensor every 10 days., Disp: 3 each, Rfl: 11    blood-glucose transmitter (DEXCOM G6 TRANSMITTER) Janny, Use as directed. Change transmitter every 3 months., Disp: 1 each, Rfl: 3    glucagon (BAQSIMI) 3 mg/actuation Spry, Use as needed for severe hypoglycemia, Disp: 2 each, Rfl: 1    ibuprofen (ADVIL,MOTRIN) 800 MG tablet, Take 1 tablet (800 mg total) by mouth every 8 (eight) hours as needed for Pain., Disp: 60 tablet, Rfl: 6    ILET INFUSION KIT-INSET 23" Cmpk, CHANGE cartridge AND infusion set every 1 & 1/2 TO 2 days as directed, Disp: , Rfl:     insulin aspart U-100 (NOVOLOG FLEXPEN U-100 INSULIN) 100 unit/mL (3 mL) InPn pen, ICR 1:6 TID AC + correction scale. To have in case of insulin pump failureICR 1:6 TID AC + correction scale. To have in case of insulin pump failure. Max TDD of 100 units, Disp: 30 each, Rfl: 3    insulin aspart, niacinamide, (FIASP FLEXTOUCH U-100 INSULIN) 100 unit/mL (3 mL) InPn, ICR 1:6 TID AC + correction scale. To have in case of insulin pump failureICR 1:6 TID AC + correction scale. To have in case of insulin pump failure. Max TDD of 100 units, Disp: 30 Pen, Rfl: 3    insulin aspart, niacinamide, (FIASP U-100 INSULIN) 100 unit/mL Soln, To use continuously with Omnipod 5. Max TDD of 100 units, Disp: 90 mL, Rfl: 3    insulin glargine U-100, Lantus, (LANTUS SOLOSTAR U-100 INSULIN) 100 unit/mL (3 mL) InPn pen, Inject 40 Units into the skin every evening. Titrate up until FBG is <130. Max TDD 50 units. To have as backup insulin in case of pump failure, Disp: " "15 mL, Rfl: 6    insulin glargine, TOUJEO, (TOUJEO SOLOSTAR U-300 INSULIN) 300 unit/mL (1.5 mL) InPn pen, Inject 40 Units into the skin every evening. Titrate up until FBG is <130. Max TDD 60 units.  To have as backup insulin in case of pump failure, Disp: 12 Pen, Rfl: 2    insulin pump cart,automated,BT (OMNIPOD 5 G6 PODS, GEN 5,) Crtg, Inject 1 Device into the skin every 48 hours., Disp: 3 each, Rfl: 11    lancets Misc, To check BG 4 times daily, to use with insurance preferred meter, Disp: 150 each, Rfl: 11    levocetirizine (XYZAL) 5 MG tablet, Take 1 tablet by mouth every evening., Disp: , Rfl:     OMNIPOD 5 G6-G7 PODS, GEN 5, Crtg, SMARTSI Device SUB-Q Every Other Day, Disp: , Rfl:     ONETOUCH DELICA PLUS LANCET 33 gauge Misc, Apply topically., Disp: , Rfl:     ONETOUCH VERIO FLEX METER Misc, USE TO CHECK BLOOD GLUSCOSE 4 TIMES PER DAY, Disp: , Rfl:     pen needle, diabetic (BD ULTRA-FINE IZZY PEN NEEDLE) 32 gauge x 5/32" Ndle, 1 Device by Misc.(Non-Drug; Combo Route) route 6 (six) times daily., Disp: 550 each, Rfl: 3    pravastatin (PRAVACHOL) 10 MG tablet, Take 1 tablet (10 mg total) by mouth once daily., Disp: 90 tablet, Rfl: 3    semaglutide (OZEMPIC) 1 mg/dose (4 mg/3 mL), Inject 1 mg into the skin every 7 days., Disp: 1 each, Rfl: 6    valACYclovir (VALTREX) 1000 MG tablet, Take 1 tablet (1,000 mg total) by mouth once daily. Start after 7-day course, for suppression., Disp: 30 tablet, Rfl: 11    Current Facility-Administered Medications:     etonogestreL 68 mg intradermal implant 68 mg, 68 mg, Implant, , , 68 mg at 24 1430      Review of Systems:  Constitutional: no fever or chills  Eyes: no visual changes  ENT: no nasal congestion or sore throat  Respiratory: no cough or shortness of breath  Cardiovascular: no chest pain  Gastrointestinal: no nausea or vomiting, tolerating diet  Musculoskeletal: arthralgias and pain    OBJECTIVE:      Vital Signs (Most Recent):  There were no vitals filed for " this visit.  There is no height or weight on file to calculate BMI.      Physical Exam:  Constitutional: The patient appears well-developed and well-nourished. No distress.   Skin: No lesions appreciated  Head: Normocephalic and atraumatic.   Nose: Nose normal.   Ears: No deformities seen  Eyes: Conjunctivae and EOM are normal.   Neck: No tracheal deviation present.   Cardiovascular: Normal rate and intact distal pulses.    Pulmonary/Chest: Effort normal. No respiratory distress.   Abdominal: There is no guarding.   Neurological: The patient is alert.   Psychiatric: The patient has a normal mood and affect.     Right Hand/Wrist Examination:    Observation/Inspection:  Swelling  none    Deformity  none  Discoloration  none     Scars   none    Atrophy  none    HAND/WRIST EXAMINATION:  Finkelstein's Test   Neg  WHAT Test    Neg  Snuff box tenderness   Neg  Scott's Test    Neg  Hook of Hamate Tenderness  Neg  CMC grind    Neg  Circumduction test   Neg  TTP     A1 pulley/flexor tendon right middle, possible nodules    Neurovascular Exam:  Digits WWP, brisk CR < 3s throughout  NVI motor/LTS to M/R/U nerves, radial pulse 2+  Tinel's Test - Carpal Tunnel  +  Tinel's Test - Cubital Tunnel  Neg  Phalen's Test    +  Median Nerve Compression Test +  AIN Intact (O-Marichuy), PIN Intact (Thumbs Up), Ulnar Intact (scissors)    ROM hand, unable to fully plan hand on a flat surface, middle finger does not lay flat    ROM wrist full, painless    ROM elbow full, painless    Abdomen not guarded  Respirations nonlabored  Perfusion intact    Diagnostic Results:     Imaging - I independently viewed the patient's imaging as well as the radiology report.  Xrays of the patient's b/l hands  demonstrate no evidence of any obvious acute fractures or dislocations or significant degenerative changes.      ASSESSMENT/PLAN:      1. Dupuytren contracture of right hand  Ambulatory referral/consult to Orthopedics      2. S/P carpal tunnel release  EMG W/  ULTRASOUND AND NERVE CONDUCTION TEST 2 Extremities      3. Carpal tunnel syndrome on left  EMG W/ ULTRASOUND AND NERVE CONDUCTION TEST 2 Extremities        I made the decision to obtain old records of the patient including previous notes and imaging. If new imaging was ordered today of the extremity or extremities evaluated. I independently reviewed and interpreted the xray as well as prior imaging. Reviewed imaging in detail with patient.     We discussed at length different treatment options including conservative vs surgical management. These include anti-inflammatories, acetaminophen, rest, ice, heat, formal physical therapy including strengthening and stretching exercises, home exercise programs, injections, and finally surgical intervention.      Patient returns for follow up of right hand pain today.  On clinical examination and actually appears she may have early Dupuytren's contracture.  She is unable to lay her hand fully on a flat surface.  Hand strength overall unremarkable.  Possible nodules along flexor tendon.  We will send her to hand surgery for evaluation of this.  Referral placed.  Also developing carpal tunnel syndrome again, had surgery about 15 years ago.  New EMG ordered for bilateral upper extremity to rule out carpal tunnel syndrome.  Follow up for results.    Follow up: pending EMG  Xrays needed: none     All of the patient's questions were answered and the patient will contact us if they have any questions or concerns in the interim.

## 2025-02-14 ENCOUNTER — TELEPHONE (OUTPATIENT)
Dept: ALLERGY | Facility: CLINIC | Age: 45
End: 2025-02-14
Payer: COMMERCIAL

## 2025-03-13 ENCOUNTER — OFFICE VISIT (OUTPATIENT)
Dept: ORTHOPEDICS | Facility: CLINIC | Age: 45
End: 2025-03-13
Payer: COMMERCIAL

## 2025-03-13 ENCOUNTER — HOSPITAL ENCOUNTER (OUTPATIENT)
Dept: RADIOLOGY | Facility: HOSPITAL | Age: 45
Discharge: HOME | End: 2025-03-13
Payer: COMMERCIAL

## 2025-03-13 DIAGNOSIS — M65.331 TRIGGER MIDDLE FINGER OF RIGHT HAND: Primary | ICD-10-CM

## 2025-03-13 DIAGNOSIS — M25.531 RIGHT WRIST PAIN: ICD-10-CM

## 2025-03-13 DIAGNOSIS — M72.0 DUPUYTREN CONTRACTURE OF RIGHT HAND: ICD-10-CM

## 2025-03-13 PROCEDURE — 99999 PR PBB SHADOW E&M-EST. PATIENT-LVL III: CPT | Mod: PBBFAC,,, | Performed by: ORTHOPAEDIC SURGERY

## 2025-03-13 PROCEDURE — 73110 X-RAY EXAM OF WRIST: CPT | Mod: 26,RT,, | Performed by: RADIOLOGY

## 2025-03-13 PROCEDURE — 99204 OFFICE O/P NEW MOD 45 MIN: CPT | Mod: S$GLB,,, | Performed by: ORTHOPAEDIC SURGERY

## 2025-03-13 PROCEDURE — 1159F MED LIST DOCD IN RCRD: CPT | Mod: CPTII,S$GLB,, | Performed by: ORTHOPAEDIC SURGERY

## 2025-03-13 PROCEDURE — 73110 X-RAY EXAM OF WRIST: CPT | Mod: TC,PN,RT

## 2025-03-13 RX ORDER — CEFAZOLIN SODIUM 2 G/50ML
2 SOLUTION INTRAVENOUS
OUTPATIENT
Start: 2025-03-13

## 2025-03-13 NOTE — PROGRESS NOTES
CC:  Chronic trigger finger right middle finger with flexion contracture        HPI:  Leona Tyler is a very pleasant 44 y.o. female with chronic triggering of the right middle finger now developed into a flexion contracture at the PIP joint   She has had injections in the past without relief   She is referred for possible surgery         PAST MEDICAL HISTORY:   Past Medical History:   Diagnosis Date    Diabetes mellitus type I      PAST SURGICAL HISTORY:   Past Surgical History:   Procedure Laterality Date    CARPAL TUNNEL RELEASE Right      SECTION      MYOMECTOMY       FAMILY HISTORY:   Family History   Problem Relation Name Age of Onset    Diabetes Paternal Grandmother          type 1    Diabetes Maternal Grandmother          type 2    Diabetes Father          type 1    Diabetes Mother          type 2    Breast cancer Maternal Aunt      Ovarian cancer Paternal Aunt      Colon cancer Neg Hx       SOCIAL HISTORY: Social History[1]    MEDICATIONS: Current Medications[2]  ALLERGIES: Review of patient's allergies indicates:  No Known Allergies    Review of Systems:  Constitutional: no fever or chills  ENT: no nasal congestion or sore throat  Respiratory: no cough or shortness of breath  Cardiovascular: no chest pain or palpitations  Gastrointestinal: no nausea or vomiting, PUD, GERD, NSAID intolerance  Genitourinary: no hematuria or dysuria  Integument/Breast: no rash or pruritis  Hematologic/Lymphatic: no easy bruising or lymphadenopathy  Musculoskeletal: see HPI  Neurological: no seizures or tremors  Behavioral/Psych: no auditory or visual hallucinations      Physical Exam   Vitals:    25 1530   PainSc:   8   PainLoc: Hand       Constitutional: Oriented to person, place, and time. Appears well-developed and well-nourished.   HENT:   Head: Normocephalic and atraumatic.   Nose: Nose normal.   Eyes: No scleral icterus.   Neck: Normal range of motion.   Cardiovascular: Normal rate and regular rhythm.  "   Pulses:       Radial pulses are 2+ on the right side, and 2+ on the left side.   Pulmonary/Chest: Effort normal and breath sounds normal.   Abdominal: Soft.   Neurological: Alert and oriented to person, place, and time.   Skin: Skin is warm.   Psychiatric: Normal mood and affect.     MUSCULOSKELETAL UPPER EXTREMITY:  Examination of the right hand she does have about a 30 degree flexion contracture PIP joint of the right middle finger there is tenderness and slight swelling over the A1 pulley   Flexion is limited as well   Sensation intact               Diagnostic Studies:  None        Assessment:  Chronic trigger right middle finger with flexion contracture PIP joint    Plan:  I explained the nature of the problem to the patient   I have recommended surgical treatment to include trigger release and possible PIP joint release if needed   The patient would like to set this up as an outpatient surgery   I also stressed the importance of postop therapy and splinting she understands      The risks and benefits of surgery were discussed with the patient today and they understand.  The consent was signed in the office for surgery.      Smooth Lamar MD (Jay)  [facility]  Orthopedic Upper Extremity Surgery            [1]   Social History  Socioeconomic History    Marital status:    Tobacco Use    Smoking status: Never    Smokeless tobacco: Never   Substance and Sexual Activity    Alcohol use: Yes     Alcohol/week: 2.0 standard drinks of alcohol     Types: 2 Glasses of wine per week    Drug use: Never    Sexual activity: Yes     Partners: Male   Social History Narrative    ** Merged History Encounter **        [2]   Current Outpatient Medications:     azelastine (ASTELIN) 137 mcg (0.1 %) nasal spray, 1 spray (137 mcg total) by Nasal route 2 (two) times daily., Disp: 30 mL, Rfl: 0    blood sugar diagnostic Strp, To check BG 4 times daily, to use with insurance preferred meter, Disp: 400 each, Rfl: 3    " "blood-glucose meter kit, To check BG 4 times daily, to use with insurance preferred meter, Disp: 1 each, Rfl: 0    blood-glucose sensor (DEXCOM G6 SENSOR) Janny, Use as directed. Change sensor every 10 days., Disp: 3 each, Rfl: 11    blood-glucose transmitter (DEXCOM G6 TRANSMITTER) Janny, Use as directed. Change transmitter every 3 months., Disp: 1 each, Rfl: 3    glucagon (BAQSIMI) 3 mg/actuation Spry, Use as needed for severe hypoglycemia, Disp: 2 each, Rfl: 1    ibuprofen (ADVIL,MOTRIN) 800 MG tablet, Take 1 tablet (800 mg total) by mouth every 8 (eight) hours as needed for Pain., Disp: 60 tablet, Rfl: 6    ILET INFUSION KIT-INSET 23" Cmpk, CHANGE cartridge AND infusion set every 1 & 1/2 TO 2 days as directed, Disp: , Rfl:     insulin aspart U-100 (NOVOLOG FLEXPEN U-100 INSULIN) 100 unit/mL (3 mL) InPn pen, ICR 1:6 TID AC + correction scale. To have in case of insulin pump failureICR 1:6 TID AC + correction scale. To have in case of insulin pump failure. Max TDD of 100 units, Disp: 30 each, Rfl: 3    insulin aspart, niacinamide, (FIASP FLEXTOUCH U-100 INSULIN) 100 unit/mL (3 mL) InPn, ICR 1:6 TID AC + correction scale. To have in case of insulin pump failureICR 1:6 TID AC + correction scale. To have in case of insulin pump failure. Max TDD of 100 units, Disp: 30 Pen, Rfl: 3    insulin aspart, niacinamide, (FIASP U-100 INSULIN) 100 unit/mL Soln, To use continuously with Omnipod 5. Max TDD of 100 units, Disp: 90 mL, Rfl: 3    insulin glargine U-100, Lantus, (LANTUS SOLOSTAR U-100 INSULIN) 100 unit/mL (3 mL) InPn pen, Inject 40 Units into the skin every evening. Titrate up until FBG is <130. Max TDD 50 units. To have as backup insulin in case of pump failure, Disp: 15 mL, Rfl: 6    insulin glargine, TOUJEO, (TOUJEO SOLOSTAR U-300 INSULIN) 300 unit/mL (1.5 mL) InPn pen, Inject 40 Units into the skin every evening. Titrate up until FBG is <130. Max TDD 60 units.  To have as backup insulin in case of pump failure, Disp: " "12 Pen, Rfl: 2    insulin pump cart,automated,BT (OMNIPOD 5 G6 PODS, GEN 5,) Crtg, Inject 1 Device into the skin every 48 hours., Disp: 3 each, Rfl: 11    lancets Misc, To check BG 4 times daily, to use with insurance preferred meter, Disp: 150 each, Rfl: 11    levocetirizine (XYZAL) 5 MG tablet, Take 1 tablet by mouth every evening., Disp: , Rfl:     OMNIPOD 5 G6-G7 PODS, GEN 5, Crtg, SMARTSI Device SUB-Q Every Other Day, Disp: , Rfl:     ONETOUCH DELICA PLUS LANCET 33 gauge Misc, Apply topically., Disp: , Rfl:     ONETOUCH VERIO FLEX METER Misc, USE TO CHECK BLOOD GLUSCOSE 4 TIMES PER DAY, Disp: , Rfl:     pen needle, diabetic (BD ULTRA-FINE IZZY PEN NEEDLE) 32 gauge x 5/32" Ndle, 1 Device by Misc.(Non-Drug; Combo Route) route 6 (six) times daily., Disp: 550 each, Rfl: 3    pravastatin (PRAVACHOL) 10 MG tablet, Take 1 tablet (10 mg total) by mouth once daily., Disp: 90 tablet, Rfl: 3    semaglutide (OZEMPIC) 1 mg/dose (4 mg/3 mL), Inject 1 mg into the skin every 7 days., Disp: 1 each, Rfl: 6    valACYclovir (VALTREX) 1000 MG tablet, Take 1 tablet (1,000 mg total) by mouth once daily. Start after 7-day course, for suppression., Disp: 30 tablet, Rfl: 11    Current Facility-Administered Medications:     etonogestreL 68 mg intradermal implant 68 mg, 68 mg, Implant, , , 68 mg at 24 1430    "

## 2025-03-14 ENCOUNTER — PATIENT MESSAGE (OUTPATIENT)
Dept: DIABETES | Facility: CLINIC | Age: 45
End: 2025-03-14
Payer: COMMERCIAL

## 2025-03-16 DIAGNOSIS — E10.65 TYPE 1 DIABETES MELLITUS WITH HYPERGLYCEMIA: ICD-10-CM

## 2025-03-17 RX ORDER — PEN NEEDLE, DIABETIC 32GX 5/32"
NEEDLE, DISPOSABLE MISCELLANEOUS
Qty: 540 EACH | Refills: 3 | Status: SHIPPED | OUTPATIENT
Start: 2025-03-17

## 2025-03-18 ENCOUNTER — PATIENT MESSAGE (OUTPATIENT)
Dept: ORTHOPEDICS | Facility: CLINIC | Age: 45
End: 2025-03-18
Payer: COMMERCIAL

## 2025-03-19 ENCOUNTER — PATIENT MESSAGE (OUTPATIENT)
Dept: DIABETES | Facility: CLINIC | Age: 45
End: 2025-03-19
Payer: COMMERCIAL

## 2025-03-19 ENCOUNTER — OFFICE VISIT (OUTPATIENT)
Dept: OBSTETRICS AND GYNECOLOGY | Facility: CLINIC | Age: 45
End: 2025-03-19
Payer: COMMERCIAL

## 2025-03-19 VITALS
BODY MASS INDEX: 29.55 KG/M2 | DIASTOLIC BLOOD PRESSURE: 82 MMHG | SYSTOLIC BLOOD PRESSURE: 118 MMHG | HEIGHT: 67 IN | WEIGHT: 188.25 LBS

## 2025-03-19 DIAGNOSIS — Z97.5 NEXPLANON IN PLACE: ICD-10-CM

## 2025-03-19 DIAGNOSIS — Z97.5 BREAKTHROUGH BLEEDING ON NEXPLANON: ICD-10-CM

## 2025-03-19 DIAGNOSIS — N93.9 ABNORMAL UTERINE BLEEDING (AUB): Primary | ICD-10-CM

## 2025-03-19 DIAGNOSIS — N92.1 BREAKTHROUGH BLEEDING ON NEXPLANON: ICD-10-CM

## 2025-03-19 PROCEDURE — 99999 PR PBB SHADOW E&M-EST. PATIENT-LVL V: CPT | Mod: PBBFAC,,, | Performed by: OBSTETRICS & GYNECOLOGY

## 2025-03-19 RX ORDER — SODIUM CHLORIDE 9 MG/ML
INJECTION, SOLUTION INTRAVENOUS CONTINUOUS
OUTPATIENT
Start: 2025-03-19

## 2025-03-19 RX ORDER — FAMOTIDINE 20 MG/1
20 TABLET, FILM COATED ORAL
OUTPATIENT
Start: 2025-03-19

## 2025-03-19 RX ORDER — MUPIROCIN 20 MG/G
OINTMENT TOPICAL
OUTPATIENT
Start: 2025-03-19

## 2025-03-19 NOTE — PROGRESS NOTES
"Subjective     Patient ID: Leona Tyler is a 44 y.o. female.    Chief Complaint:  BLEEDING ON NEXPLANON       History of Present Illness  HPI  44 y.o.  presents with c/o continued bleeding on Nexplanon.  Device placed 24 due to irregular/heavy cycles.  Has continued to bleed off and on since insertion.  No long periods of no bleeding.  Denies CP/SOB/dizziness.  Bleeding affecting daily life, including sex life.  Has discussed ablation in the past, also considering hysterectomy, as symptoms are becoming very bothersome.  H/o BTL.   H/o embedded IUD in the past.  No other complaints today.      GYN & OB History  No LMP recorded. Patient has had an implant.   Date of Last Pap: 2023    OB History    Para Term  AB Living   2 2 0 2  3   SAB IAB Ectopic Multiple Live Births      1 3      # Outcome Date GA Lbr Pillo/2nd Weight Sex Type Anes PTL Lv   2A  19    M CS-Classical   JEFF   2B  19    M CS-Classical   JEFF   1  17    M CS-Unspec  Y JEFF       Review of Systems  Review of Systems   Constitutional:  Negative for chills and fever.   Respiratory:  Negative for shortness of breath.    Cardiovascular:  Negative for chest pain.   Gastrointestinal:  Negative for abdominal pain, constipation and diarrhea.   Genitourinary:  Positive for menstrual problem and vaginal bleeding. Negative for pelvic pain and vaginal discharge.   Musculoskeletal:  Negative for myalgias.   Neurological:  Negative for headaches.          Objective   Physical Exam    /82   Ht 5' 7" (1.702 m)   Wt 85.4 kg (188 lb 4.4 oz)   BMI 29.49 kg/m²     Gen: NAD  Resp: Normal respiratory effort  Pelvic: deferred  Ext: normal ROM, Nexplanon palpable in LUE, just under Dexcom.   Psych: appropriate affect  Neuro: grossly intact      Pelvic US:   Uterus:   Size: 10.9 x 5.0 x 8.3 cm     Masses: There is a small hypoechoic mass at the fundus or submucosal region 1.0 cm could be a small " uterine fibroid.     Endometrium: Normal in this pre menopausal patient, measuring 12 mm.  Nonspecific small calcific density noted within the endometrial cavity.  Measures about 5 mm.     Right ovary:   Size: 4.4 x 1.9 x 3.5 cm   Appearance: Follicles seen.   Vascular flow: Normal.     Left ovary:   Size: 3.2 x 2.4 x 3.1 cm   Appearance: Follicles seen.   Vascular Flow: Normal.     Free Fluid:   Trace of free fluid in the cul de sac.     Impression:   Small submucosal uterine fibroid at the fundus.   Small calcific density at the endometrial cavity near the fundus, 5 mm, likely of no clinical significance.          Assessment and Plan     Leona was seen today for bleeding on nexplanon .    Diagnoses and all orders for this visit:    Abnormal uterine bleeding (AUB)  -     Case Request Operating Room: ABLATION, ENDOMETRIUM, HYSTEROSCOPY, WITH DILATION AND CURETTAGE OF UTERUS  -     Full code; Standing  -     Vital signs; Standing  -     Insert peripheral IV; Standing  -     Bernardo to Gravity; Standing  -     POCT glucose; Standing  -     Notify physician if BS > 180 for hysterectomy patients; Standing  -     Chlorhexidine (CHG) 2% Wipes; Standing  -     Notify Physician/Vital Signs Parameters Urine output less than 0.5mL/kg/hr (with indwelling catheter) or 30 mL/hr (without indwelling catheter) or blood glucose greater than 200 mg/dL; Standing  -     Notify physician; Standing  -     Notify Physician - Potential Need of Opioid Reversal; Standing  -     Diet NPO; Standing  -     Chlorohexidine Gluconate Bath; Standing  -     Place in Outpatient; Standing  -     Pregnancy, urine rapid; Standing  -     CBC auto differential; Standing  -     Place sequential compression device; Standing    Nexplanon in place  -     Removal of Nexplanon Device    Breakthrough bleeding on Nexplanon  -     Removal of Nexplanon Device    Other orders  -     0.9% NaCl infusion  -     IP VTE LOW RISK PATIENT; Standing  -     mupirocin 2 %  ointment  -     famotidine tablet 20 mg          Plan:   Discussed bleeding with patient, desires more definitive, non-hormonal treatment now with ablation or hysterectomy.  Discussed R/B/A of both procedures.  Would not do BSO at time of hyst, so no menopausal symptoms/concerns, but would require longer recovery.  She is scheduled for hand surgery in April, and would prefer a more conservative approach with faster recovery at this time.  Will proceed with endometrial ablation at this time.  Consents signed today.  All questions answered.  Declines EMB (negative biopsy a few years ago per pt).    Also discussed removal on Nexplanon at time of procedure, or in office today, desires immediate removal in office today - see procedure note.    Counseling done, precautions given, all questions answered.  Will plan hysteroscopy, D&C, and endometrial ablation in OR on 4/1/25.  RTC 2 wks post-op.

## 2025-03-19 NOTE — H&P
"[facility]  Obstetrics & Gynecology  History & Physical    Patient Name: Leona Tyler  MRN: [id number]  Admission Date: (Not on file)  Primary Care Provider: Beba, Primary Doctor    Subjective:     Chief Complaint/Reason for Admission: AUB    History of Present Illness: 44 y.o.  presents with c/o continued bleeding on Nexplanon.  Device placed 24 due to irregular/heavy cycles.  Has continued to bleed off and on since insertion.  No long periods of no bleeding.  Denies CP/SOB/dizziness.  Bleeding affecting daily life, including sex life.  Has discussed ablation in the past, also considering hysterectomy, as symptoms are becoming very bothersome.  H/o BTL.   H/o embedded IUD in the past.  No other complaints today.      Current Outpatient Medications on File Prior to Visit   Medication Sig    azelastine (ASTELIN) 137 mcg (0.1 %) nasal spray 1 spray (137 mcg total) by Nasal route 2 (two) times daily.    BD IZZY 2ND GEN PEN NEEDLE 32 gauge x 5/32" Ndle USE AND DISCARD 1 PEN      NEEDLE 6 TIMES DAILY    blood sugar diagnostic Strp To check BG 4 times daily, to use with insurance preferred meter    blood-glucose meter kit To check BG 4 times daily, to use with insurance preferred meter    blood-glucose sensor (DEXCOM G6 SENSOR) Janny Use as directed. Change sensor every 10 days.    blood-glucose transmitter (DEXCOM G6 TRANSMITTER) Janny Use as directed. Change transmitter every 3 months.    glucagon (BAQSIMI) 3 mg/actuation Spry Use as needed for severe hypoglycemia    ibuprofen (ADVIL,MOTRIN) 800 MG tablet Take 1 tablet (800 mg total) by mouth every 8 (eight) hours as needed for Pain.    ILE[facility]INSET 23[facility] cartridge AND infusion set every 1 & 1/2 TO 2 days as directed    insulin aspart U-100 (NOVOLOG FLEXPEN U-100 INSULIN) 100 unit/mL (3 mL) InPn pen ICR 1:6 TID AC + correction scale. To have in case of insulin pump failureICR 1:6 TID AC + correction scale. To have in case " of insulin pump failure. Max TDD of 100 units    insulin aspart, niacinamide, (FIASP FLEXTOUCH U-100 INSULIN) 100 unit/mL (3 mL) InPn ICR 1:6 TID AC + correction scale. To have in case of insulin pump failureICR 1:6 TID AC + correction scale. To have in case of insulin pump failure. Max TDD of 100 units    insulin aspart, niacinamide, (FIASP U-100 INSULIN) 100 unit/mL Soln To use continuously with Omnipod 5. Max TDD of 100 units    insulin glargine U-100, Lantus, (LANTUS SOLOSTAR U-100 INSULIN) 100 unit/mL (3 mL) InPn pen Inject 40 Units into the skin every evening. Titrate up until FBG is <130. Max TDD 50 units. To have as backup insulin in case of pump failure    insulin glargine, TOUJEO, (TOUJEO SOLOSTAR U-300 INSULIN) 300 unit/mL (1.5 mL) InPn pen Inject 40 Units into the skin every evening. Titrate up until FBG is <130. Max TDD 60 units.  To have as backup insulin in case of pump failure    insulin pump cart,automated,BT (OMNIPOD 5 G6 PODS, GEN 5,) Crtg Inject 1 Device into the skin every 48 hours.    lancets Misc To check BG 4 times daily, to use with insurance preferred meter    levocetirizine (XYZAL) 5 MG tablet Take 1 tablet by mouth every evening.    OMNIPOD 5 G6-G7 PODS, GEN 5, Crtg SMARTSI Device SUB-Q Every Other Day    ONETOUCH DELICA PLUS LANCET 33 gauge Misc Apply topically.    ONETOUCH VERIO FLEX METER Misc USE TO CHECK BLOOD GLUSCOSE 4 TIMES PER DAY    pravastatin (PRAVACHOL) 10 MG tablet Take 1 tablet (10 mg total) by mouth once daily.    semaglutide (OZEMPIC) 1 mg/dose (4 mg/3 mL) Inject 1 mg into the skin every 7 days.    valACYclovir (VALTREX) 1000 MG tablet Take 1 tablet (1,000 mg total) by mouth once daily. Start after 7-day course, for suppression.     Current Facility-Administered Medications on File Prior to Visit   Medication    etonogestreL 68 mg intradermal implant 68 mg       Review of patient's allergies indicates:  No Known Allergies    Past Medical History:   Diagnosis Date     Diabetes mellitus type I      OB History    Para Term  AB Living   2 2 0 2  3   SAB IAB Ectopic Multiple Live Births      1 3      # Outcome Date GA Lbr Pillo/2nd Weight Sex Type Anes PTL Lv   2A  21    M CS-Classical   JEFF   2B  21    M CS-Classical   JEFF   1  19    M CS-Unspec  Y JEFF     Past Surgical History:   Procedure Laterality Date    CARPAL TUNNEL RELEASE Right      SECTION      MYOMECTOMY       Family History       Problem Relation (Age of Onset)    Breast cancer Maternal Aunt    Diabetes Paternal Grandmother, Maternal Grandmother, Father, Mother    Ovarian cancer Paternal Aunt          Tobacco Use    Smoking status: Never    Smokeless tobacco: Never   Substance and Sexual Activity    Alcohol use: Yes     Alcohol/week: 2.0 standard drinks of alcohol     Types: 2 Glasses of wine per week    Drug use: Never    Sexual activity: Yes     Partners: Male     Review of Systems  Constitutional:  Negative for chills and fever.   Respiratory:  Negative for shortness of breath.    Cardiovascular:  Negative for chest pain.   Gastrointestinal:  Negative for abdominal pain, constipation and diarrhea.   Genitourinary:  Positive for menstrual problem and vaginal bleeding. Negative for pelvic pain and vaginal discharge.   Musculoskeletal:  Negative for myalgias.   Neurological:  Negative for headaches.       Objective:     Vital Signs (Most Recent):  BP: 118/82 (25 1621) Vital Signs (24h Range):  [unfilled]     Weight: 85.4 kg (188 lb 4.4 oz)  Body mass index is 29.49 kg/m².  No LMP recorded. Patient has had an implant.    Physical Exam  Gen: NAD  Resp: Normal respiratory effort  Pelvic: deferred  Ext: normal ROM, Nexplanon palpable in LUE, just under Dexcom - removed in office 3/19/25.  Psych: appropriate affect  Neuro: grossly intact      Laboratory:  Lab Results   Component Value Date    WBC 5.87 10/29/2024    HGB 14.9 10/29/2024    HCT 43.8 10/29/2024     MCV 95 10/29/2024     (H) 10/29/2024           Diagnostic Results:  Pelvic US:   Uterus:   Size: 10.9 x 5.0 x 8.3 cm     Masses: There is a small hypoechoic mass at the fundus or submucosal region 1.0 cm could be a small uterine fibroid.     Endometrium: Normal in this pre menopausal patient, measuring 12 mm.  Nonspecific small calcific density noted within the endometrial cavity.  Measures about 5 mm.     Right ovary:   Size: 4.4 x 1.9 x 3.5 cm   Appearance: Follicles seen.   Vascular flow: Normal.     Left ovary:   Size: 3.2 x 2.4 x 3.1 cm   Appearance: Follicles seen.   Vascular Flow: Normal.     Free Fluid:   Trace of free fluid in the cul de sac.     Impression:   Small submucosal uterine fibroid at the fundus.   Small calcific density at the endometrial cavity near the fundus, 5 mm, likely of no clinical significance.       Assessment/Plan:     42yo  with AUB, failed medical management.    Discussed bleeding with patient, desires more definitive, non-hormonal treatment now with ablation or hysterectomy.  Discussed R/B/A of both procedures.  Would not do BSO at time of hyst, so no menopausal symptoms/concerns, but would require longer recovery.  She is scheduled for hand surgery in April, and would prefer a more conservative approach with faster recovery at this time.  Will proceed with endometrial ablation at this time.  Consents signed today.  All questions answered.  Declines EMB (negative biopsy a few years ago per pt).    Repeat CBC day of surgery due to continued bleeding.   Also discussed removal on Nexplanon at time of procedure, or in office today, desires immediate removal in office today - see procedure note.    Counseling done, precautions given, all questions answered.  Will plan hysteroscopy, D&C, and endometrial ablation in OR on 25.  RTC 2 wks post-op.        Crissy Ponce MD  Obstetrics & Gynecology  [facility] [place] [id number]

## 2025-03-19 NOTE — PROCEDURES
Removal of Nexplanon Device    Date/Time: 3/19/2025 4:15 PM    Performed by: Crissy Ponce MD  Authorized by: Crissy Ponce MD    Consent obtained:  Prior to procedure the appropriate consent was completed and verified  Consent given by:  Patient  Procedure risks and benefits discussed: yes    Patient questions answered: yes    Patient agrees, verbalizes understanding, and wants to proceed: yes    Implant grasped by: hemostat  Other reason for removal:  Excessive bleeding  Removed with no complications: yes    Arm: left arm  Palpation confirms location: yes  Small stab incision was made in arm: yes  Upon removal device was intact: yes  Site was close with steri-strips and pressure bandage applied: yes  Prepped with:  povidone-iodine 7.5% surgical scrub  Local anesthetic:  Lidocaine 1%   The site was cleaned  and prepped in a sterile fashion: yes  Specimen sent to pathology: Yes

## 2025-03-25 ENCOUNTER — PATIENT MESSAGE (OUTPATIENT)
Dept: OBSTETRICS AND GYNECOLOGY | Facility: CLINIC | Age: 45
End: 2025-03-25
Payer: COMMERCIAL

## 2025-03-25 ENCOUNTER — PATIENT MESSAGE (OUTPATIENT)
Dept: OTOLARYNGOLOGY | Facility: CLINIC | Age: 45
End: 2025-03-25
Payer: COMMERCIAL

## 2025-03-25 DIAGNOSIS — N95.1 PERIMENOPAUSAL SYMPTOMS: Primary | ICD-10-CM

## 2025-03-27 ENCOUNTER — OFFICE VISIT (OUTPATIENT)
Dept: OTOLARYNGOLOGY | Facility: CLINIC | Age: 45
End: 2025-03-27
Payer: COMMERCIAL

## 2025-03-27 DIAGNOSIS — J31.1 CHRONIC NASOPHARYNGITIS: Primary | ICD-10-CM

## 2025-03-27 DIAGNOSIS — J01.91 ACUTE RECURRENT SINUSITIS, UNSPECIFIED LOCATION: ICD-10-CM

## 2025-03-27 RX ORDER — CEFDINIR 300 MG/1
300 CAPSULE ORAL 2 TIMES DAILY
Qty: 28 CAPSULE | Refills: 0 | Status: SHIPPED | OUTPATIENT
Start: 2025-03-27 | End: 2025-04-10

## 2025-03-27 RX ORDER — METHYLPREDNISOLONE 4 MG/1
TABLET ORAL
Qty: 21 EACH | Refills: 0 | Status: SHIPPED | OUTPATIENT
Start: 2025-03-27

## 2025-03-27 NOTE — PATIENT INSTRUCTIONS
Continue with rinses and fluticasone/Flonase.  Takes Xyzal/levo cetirizine only if helpful for sneezing and itching.  Discontinue the Astelin/azelastine as this maybe causing increasing dryness and irritation of the throat.  Add some mupirocin ointment as prescribed to the rinses for a week.    Complete laboratory testing looking up blood counts, kidney function, and immunologic testing of antibody levels for evaluation of immunosuppressed state.      Afterwards complete high-resolution CT scan of the neck to evaluate the area behind the nose (nasopharynx) for chronic infection cyst or lesion.      Complete a 2 week course of the cefdinir which was beneficial previously as well as a short dose of oral steroids as prescribed.      Follow up in the office in [place] in the next 3-4 weeks.      Voice recognition software was used in the creation of this note/communication and any sound-alike errors which may have occurred from its use should be taken in context when interpreting.  If such errors prevent a clear understanding of the note/communication, please contact the office for clarification.      NASAL SALINE    Still saline comes in many preparations including sprays/mists, gels, and rinses.  Different preparations served different purposes.  Saline spray helps to briefly moisturize the nose and help clear mucus.  Saline gels coat the nose for longer protective benefit of keeping the linings the nose moist.  Saline rinses clear the nose and sinuses and a more thorough way in her best used for significant postnasal drip and sinus complaints.  A combination of saline sprays/mists, gels and rinses should be used to address routine nasal clearing and dryness issues as well as flushing for better control of allergy and postnasal drip symptoms.  There is no real risk of over use of nasal saline products.  Saline sprays do not have any of the potential rebound or addiction of nasal decongestant sprays.  Nasal  saline sprays and rinses should be used prior to the application of any medicated nasal sprays such as nasal steroids or nasal antihistamine sprays.        INTRANASAL STEROID SPRAYS      Intranasal steroid sprays are available both by prescription and over-the-counter both in generic and brand name preparations.  They are all very similar in efficacy and side effect profiles.  Over-the-counter and prescription intranasal steroids include fluticasone propionate (Flonase), fluticasone furoate (Sensimist), triamcinolone (Nasacort), and budesonide (Rhinocort).  While these medications are available as prescriptions as well there are few nasal steroids in her available by prescription only and include mometasone (Nasonex), flunisolide (Nasarel), and beclomethasone (QNASL).    Nasal steroids or the foundation of treatment of both allergy and other inflammatory conditions of the nose and sinuses.  They are safe for regular use and while there are many side effects listed most of these are steroid class effects and not typically encountered.  Typical side effects include dryness and even ulceration and bleeding of the nose.  These side effects can be minimized by proper application and proper moisturization with saline and saline gel.    Sometimes changing between 1 brand of nasal steroid and another can result in improved control of symptoms especially after long term use of one particular nasal steroid.    Proper application of the nasal steroid spray is accomplished by spraying towards the I/ear on the same side with the tip of the superior just barely inside the nostril with the chin slightly downward.  Any dripping should be gently inhaled not sniff test backwards into the throat.  Labeled instructions should be followed.      ACID REFLUX   What is acid reflux?    When we eat, food passes from the throat and into the stomach through a tube called the esophagus. At the bottom of the esophagus is a ring of muscles that  acts as a valve between the esophagus and stomach, called the lower esophageal sphincter. Smoking, alcohol, and certain types of food may weaken the sphincter, so it may stop closing properly. The contents in the stomach then may leak back, or reflux, into the esophagus. This problem is called gastroesophageal reflux disease (GERD). Symptoms of GERD include heartburn, belching, regurgitation of stomach contents, and swallowing difficulties.    Sometimes, the stomach acid travels up through the esophagus and spills into the larynx or pharynx (voice box). This is called laryngopharyngeal reflux (LPR) and is irritating to the vocal folds and surrounding tissues. Often, patients with LPR do not experience heartburn as a symptom. More commonly, symptoms of LPR include hoarseness, excessive mucous resulting in frequent throat clearing, post-nasal drip, coughing, throat soreness or burning, choking episodes, difficulty swallowing, and sensation of a lump in the throat.     How is acid reflux treated?   Treatment for acid reflux can involve any combination of medication, lifestyle modifications, and surgery.   Medications. Your doctor may prescribe a proton pump inhibitor (PPI) or an H2 blocker. If you are prescribed a PPI, take in on an empty stomach in the morning 30 minutes prior to eating breakfast. Keep in mind that it may take 4-6 weeks before symptoms begin to resolve, so do not stop medications without consulting your doctor.   Lifestyle and dietary modifications. Eat smaller meals at a slower pace. Avoid over-eating. If you are overweight, try to lose weight. Do not lie down or exercise directly after eating; eat your last meal of the day at least 2-3 hours prior to going to sleep. Avoid tight-fitting clothes. If you are a smoker, reduce or quit smoking. Elevate your head of bed 4-6 inches by putting phone books under the legs at the head of your bed or buy a wedge pillow, but do not use more than two regular  pillows as this causes the body to curl and compresses your stomach.     Food group Foods to avoid to reduce reflux   Beverages  Whole milk, 2% milk, chocolate milk/hot chocolate, alcohol, coffee (regular and decaf), caffeinated tea, mint tea, carbonated beverages, citrus juice    Breads/grains Commercial sweet rolls, doughnuts, croissants, and other high-fat pastries    Fruits and vegetables Fried or cream-style vegetables, tomatoes, tomato-based products, citrus fruits, hot peppers    Soups and seasonings Cream, cheese, tomato-based soups, vinegar    Meats and proteins Fatty or fried meat/fish, franco, sausage, pepperoni, lunch meat, fried eggs    Fats and oil Lard, franco drippings, salt pork, meat drippings, gravies, highly seasoned salad dressings, nuts    Sweets/desserts Anything made with or from chocolate, peppermint, spearmint, whole milk, or cream; high-fat pastries, gum, hard candy           THROAT CLEARING     Why am I clearing my throat so often?   Irritation of the vocal folds and surrounding area can cause the urge to clear your throat. This irritation can be caused by acid reflux, allergies, or environmental factors, as well as from a cold or sore throat. Talk with your doctor about the treatment of possible underlying causes. However, throat clearing can turn into a cycle. You clear your throat after feeling an irritation, which causes more irritation, which causes you to continue clearing your throat, which causes more irritation.     What can I do about it?   Ask a friend or family member to help you keep track - you may not even realize how often you do it.   Replace the throat clearing with a different behavior.   Take a sip of water and then swallow. Repeat until the sensation subsides.  Swallow hard (even without water)   Use the silent throat clear method by making the h sound when you exhale  Breathe in deeply through your nose and exhale on shhh   Hum quietly   Keep yourself hydrated.    Drink plenty of water   Eat wet snacks (grapes, apples, melon, cucumber)   Use a humidifier at home or at work   Suck on hard candies, but avoid too much sugar and avoid anything with mint, menthol, camphor, or eucaplyptus, as these will have a more irritating effect.      VOCAL HYGIENE AND HYDRATION RECOMMENDATIONS     DO   Drink plenty of waterabout  oz a day! If your urine is pale, you should be well-hydrated.  Try some of these tips to increase hydration as well.  Eat wet snacks such as grapes, melon, cucumbers, apple slices   Reduce intake coffee and other caffeinated and carbonated beverages   Suck on pastille lozenges or sugar free candies (not mentholated lozenges)   Use a room or personal humidifier   Use sinus rinses/irrigations or nasal saline spray   Inhale steam for a few minutes before speaking or singing   Pay attention to how, and how much, you use your voice.   Give yourself vocal breaks throughout the day.   Breathe when talking, take frequent pauses for breath.   Use a microphone when speaking in front of a group of 15 or more to reduce voice strain.   Learn how to use your voice correctly to get loud with voice therapy techniques.  Stay healthy. Eat right and get plenty of rest. Follow a reflux precaution diet if indicated.   ____________________________________________________________________    REDUCE   Loud talking, yelling, cheering, or screaming. Voice injury can take place over a long time, or all at once.  If you need to talk loud or yell, be sure you are doing it properly.   Clearing your throat and forceful coughing. The vocal folds collect mucus when irritated or inflamed and this can cause the urge to clear your throat. The trauma of clearing the throat creates more inflammation and mucus. See tips above for keeping hydrated to assist with cutting back on throat clearing.  Instead of clearing your throat, try these behaviors until the sensation passes:   Take a sip of  water   Silently clear with a hard exhale making an hhh sound   Swallow hard   Drying agents such as anti-histamines or decongestant medications. You should always take medications as prescribed, but keep in mind these will dry you out, so increase your hydration!   ____________________________________________________________________    AVOID   Inhalant irritants such as smoke, strong fumes, and allergens. Take advantage of 1-800-QUIT-NOW if you are ready to stop smoking.   Unnecessary non-speech noises, such as grunting during exercise, loud noises, or excessively high- or low-pitched sounds. Save your voice for talking and singing!   Whispering. Whispering places your vocal folds in a tenser position than usual.     SINUS RINSE INSTRUCTIONS    Nasal Saline Irrigation Instructions  You can wash your nasal and sinus passages using nasal saline (salt water) irrigation. This   is simple and effective. Follow the instructions below, as well as the ones provided by your   physician.  Supplies  First, you will need a nasal saline irrigation bottle and rinse solution.   You can purchase nasal rinse kits that include these items (such as   NeilMed®, Ayr®, Simply Saline®, Ocean Complete®) at most drug   stores. You can also make your own saline irrigation solution by   adding kosher (non-iodine) salt and baking soda to distilled water.   Your physician may tell you to add medications like a steroid or   antibiotic to the rinse as needed.  Steps for nasal irrigation  Step 1. Fill the bottle  ? Wash your hands.  ? Fill the irrigation bottle with lukewarm distilled water or boiled water that has cooled.  Step 2. Mix the solution  ? Put the saline and salt packet contents into the bottle.  ? Tighten the top of the bottle and shake it gently to dissolve the mixture.  ? If you are making your own solution:   - Add 1/4 to 1/2 teaspoon of baking soda and 1/8 teaspoon of kosher (non-iodine) salt   into the bottle.   - Tighten the  top of the bottle.   - Shake the bottle gently to dissolve the mixture.  Step 3. Get into position  ?  front of the sink.  ? Unless you were instructed to use another position, bend forward.   Then tilt your face down about 45 degrees so that you are looking   down into the sink.  ? Gently place the spout of the saline bottle against 1 of your nostrils.  [facility]  CARE AND TREATMENT  Patient Education  ©2018 [employer]  ©2018 [employer]  Step 4. Rinse  ? Breathing through your mouth, gently squeeze the   bottle. This will squirt the solution into your nostril. The   solution will start to drain from the other nostril. Some   may drain from your mouth. This is normal.  ? Use 2 ounces (half of the bottle) on each nostril.  ? Afterwards, you may need to blow your nose gently to   help drain any solution that is left behind.  Step 5. Repeat  ? Repeat steps 3 and 4 with the other nostril.  You can watch a video to learn how to do nasal saline irrigation. Go to [email or web address] and   search for NeilMed Sinus Rinse.  Step 6.  Clean the bottle and cap. Air dry the Sinus Rinse bottle, cap, and tube on a clean paper towel, a lint free towel, or use NeilMed® NasaDOCK® or NasaDOCK plus (sold separately) to store the bottle, cap and tube.  Please read Warnings before using.  Our recommendation is to replace the bottle every three months.      NEILMED CLEAING INSTRUCTIONS    It is very important to keep these devices clean and free from any contamination. Replace the bottle every 3 months.  NasaDock Plus  NasaDock NeilMed® SINUS RINSE Squeeze Bottle: Please perform routine inspections of the bottle and tube for any discolorations and cracks. If there are any visual signs of deterioration or permanent color changes, please clean thoroughly. If the discolorations remain after cleansing, discard the items and purchase new ones. Please follow these instructions after  each use of the product. Be sure to replace your product after three months.  Step 1: Rinse the cap, tube and bottle using running water. Fill the bottle with distilled, micro-filtered (through 0.2 micron), reverse osmosis filtered, commercially bottled or previously boiled and cooled down water at lukewarm or body temperature..  Step 2: Add a few drops of dish washing liquid or baby shampoo.  Step 3: Attach the cap and tube to the bottle; hold your finger over the opening in the cap and shake the bottle vigorously.  Step 4: Squeeze the bottle hard to allow the soapy solution to clean the interior of the tube and the cap. Empty out the bottle completely.  Step 5: Rinse the soap from the bottle, cap and tube thoroughly and place the items on a clean paper towel to dry or use the preferred NasaDOCK® or NasaDOCK plus.    The NasaDOCK® is a simple, hygienic way to dry and store the SINUS RINSE bottle, cap and tube. NasaDOCK® comes with various hanging options and is available in different colors. Our newest model also offers storage for our SINUS RINSE mixture packets. We strongly suggest using NasaDOCK® as an inexpensive, easy way to dry the cap, tube and SINUS RINSE bottle.        Cleaning:  Do not use a  to clean the inside of a bottle. While our bottle is  safe, a  will not adequately clean the SINUS RINSE bottle. The water jets in dishwashers cannot enter the narrow neck of the bottle, and portions of the bottles interior will not be cleaned thoroughly. Additional methods of cleaning the bottle include the use of concentrated white vinegar or isopropyl alcohol (70% concentration), followed by scrubbing and rinsing as described above.       Microwave Disinfection  Clean the device with soap and water as mentioned above and shake off the excess water. Now place the bottle, cap and tube in the microwave for 40 seconds. This will disinfect the bottle, cap and tube. If the  microwave has been used recently, please make sure that the inside of the microwave has cooled back down to room temperature before using it to disinfect the bottle.    NeilMed NasaFlo® Neti Pot Users:  Use the same procedure as above.    Sinugator® Cleaning Directions:  Clean the Sinugator® by running plain water and dry with a clean lint free towel and then air dry the unit by keeping it open to the air. The nasal  tip, blue reservoir and white soft tube can be disinfected by cleaning with soap and water and shaking off the excess water before placing in the home microwave for 60 seconds. Clean the entire unit with a few drops of dishwashing liquid and water every three days to keep the unit clean. As a fi nal rinse to wash off any residual soap or tap water, use either distilled, micro-filtered (through 0.2 micron filter), commercially bottled or previously boiled & cooled down water. Please make sure to rinse thoroughly during each wash so no soap is left behind. DO NOT place the white motor unit in microwave for disinfection. Because of the units stainless steel components, this can cause damage or fire hazards.    General Principles of Maintenance & Storage:  When permissible use a microwave periodically to disinfect devices. Always store NeilMed® products in a cool and dry place with adequate ventilation. NasaDOCK® or NasaDOCK plus offer a simple hygienic way to air dry & neatly store the bottle, cap, tube and NasaFlo. Do not store the bottle with the cap screwed on, unless both are dry. Do not store the wet parts in a sealed plastic bag. If you travel before they are dry, wrap parts separately in paper towels. Hand soap or shampoo can be used for cleaning parts while away from home.        USE ONLY AS DIRECTED, IF SYMPTOMS PERSIST SEE YOUR DOCTOR/HEALTHCARE PROFESSIONAL. ALWAYS READ THE LABEL.

## 2025-03-27 NOTE — PROGRESS NOTES
[facility]    Subjective:      Patient: Leona Tyler Patient PCP: Beba, Primary Doctor         :  1980     Sex:  female      MRN:  [id number]          Date of Visit: 2025      Chief Complaint: No chief complaint on file.      Patient ID: Leona Tyler is a 44 y.o. female       2025 AV tele visit follow up patient with continued symptomatic postnasal drip.  She has had some increasing reflux.  She has been on Ozempic for a few months.  She additionally though clearly describes expectorations of green discharge which only transiently improves with antibiotics and steroids.  She denies nasal obstruction, loss of smell, anterior maxillary or dental pressure or pain or any purulence noted specifically when blowing the nose or rinsing.  She is rinsing and using nasal antihistamines oral antihistamines and nasal steroids.  She was unable to keep an appointment with Allergy/immunology for possible immunologic workup.  She has a history of negative ImmunoCAP testing for allergy done at outside ENT office with no records for review.      She primarily is complaining of a persistent left-sided greater than right irritation discomfort that runs from the back of the nose to the upper throat to the level of the hyoid.  No blood.  No voice change or trouble swallowing.  No neck mass.    2025 4 week follow up visit patient seen 4 weeks ago with the acute on chronic sinus symptoms treated with three-week of cefdinir and a Medrol Dosepak if necessary with CT scanning completed just 3 days ago reviewed today shows some enlargement of the inferior turbinate and nasal septal swell body, narrowing of the ostiomeatal complex but no significant mucoperiosteal thickening, any destructive process, or fluid levels consistent with active sinus disease.  Snot 22 score has improved from 74 down to 41.    Patient felt more improvement with the methylprednisolone which he actually started at the beginning of  her three-week of cefdinir.  This was completed only a few days ago in in the last week he has already started to feel some increasing degree of runny nose postnasal drip and congestion.  No active facial pressure or any purulent drainage which seems to be the Hallmark of her progression to antibiotic treated sinusitis.  Seeing Dr. Small with allergy/immunology at [facility] on Monday.  No known prior pneumococcal vaccination.      01/28/2025 high-resolution CT sinuses images reviewed as above.         01/03/2025 initial patient consultation Patient is a  lifelong NON-smoker with a past medical history of T1DM on insulin pump referred to me by Danita Lipscomb in consultation for sinusitis.  SNOT-22 of 74.  Seen at [facility] last year.  Treated with Doxy.  CT ordered, not completed.  Intermittent purulence with chronic congestion, pressure and drainage x 2 years.  Multiple courses of antibiotics, medrol packs and steroid shots all with only transient improvement.    Labs:  WBC   Date Value Ref Range Status   10/29/2024 5.87 3.90 - 12.70 K/uL Final     Hemoglobin   Date Value Ref Range Status   10/29/2024 14.9 12.0 - 16.0 g/dL Final     Platelets   Date Value Ref Range Status   10/29/2024 544 (H) 150 - 450 K/uL Final     Creatinine   Date Value Ref Range Status   10/29/2024 1.1 0.5 - 1.4 mg/dL Final     TSH   Date Value Ref Range Status   10/29/2024 1.331 0.400 - 4.000 uIU/mL Final     Hemoglobin A1C   Date Value Ref Range Status   10/29/2024 7.1 (H) 4.0 - 5.6 % Final     Comment:     ADA Screening Guidelines:  5.7-6.4%  Consistent with prediabetes  >or=6.5%  Consistent with diabetes    High levels of fetal hemoglobin interfere with the HbA1C  assay. Heterozygous hemoglobin variants (HbS, HgC, etc)do  not significantly interfere with this assay.   However, presence of multiple variants may affect accuracy.         Past Medical History  She has a past medical history of Diabetes mellitus type I.    Family / Surgical / Social  "History  Her family history includes Breast cancer in her maternal aunt; Diabetes in her father, maternal grandmother, mother, and paternal grandmother; Ovarian cancer in her paternal aunt.    Past Surgical History:   Procedure Laterality Date    CARPAL TUNNEL RELEASE Right      SECTION      MYOMECTOMY         Social History     Tobacco Use    Smoking status: Never    Smokeless tobacco: Never   Substance and Sexual Activity    Alcohol use: Yes     Alcohol/week: 2.0 standard drinks of alcohol     Types: 2 Glasses of wine per week     Comment: occ    Drug use: Never    Sexual activity: Yes     Partners: Male       Medications  She has a current medication list which includes the following prescription(s): azelastine, bd vikram 2nd gen pen needle, blood sugar diagnostic, blood-glucose meter, dexcom g6 sensor, dexcom g6 transmitter, baqsimi, ibuprofen, ilet infusion kit-inset 23", insulin aspart u-100, fiasp flextouch u-100 insulin, fiasp u-100 insulin, lantus solostar u-100 insulin, insulin glargine (toujeo), omnipod 5 g6 pods (gen 5), lancets, levocetirizine, omnipod 5 g6-g7 pods (gen 5), onetouch delica plus lancet, onetouch verio flex meter, pravastatin, ozempic, and valacyclovir, and the following Facility-Administered Medications: etonogestrel.      Allergies  Review of patient's allergies indicates:  No Known Allergies    All medications, allergies, and past history have been reviewed.    Objective:      Vitals:      2025    11:44 AM 3/13/2025     3:30 PM 3/19/2025     4:21 PM   Vitals - 1 value per visit   SYSTOLIC 101  118   DIASTOLIC 69  82   Pulse 86     Weight (lb) 186.07  188.27   Weight (kg) 84.4  85.4   Height 5' 7" (1.702 m)  5' 7" (1.702 m)   BMI (Calculated) 29.1  29.5   Pain Score Three Eight Zero       There is no height or weight on file to calculate BSA.    Physical Exam:    GENERAL  APPEARANCE -  alert, appears stated age, and cooperative  BARRIER(S) TO COMMUNICATION -  none VOICE - " normal    INTEGUMENTARY  no suspicious head and neck lesions    HEENT  HEAD: Normocephalic, without obvious abnormality, atraumatic  FACE: INSPECTION - Symmetric, no signs of trauma, no suspicious lesion(s)      STRENGTH - facial symmetry intact       EYES  Normal occular alignment and mobility with no visible nystagmus at rest    CHEST AND LUNG   INSPECTION & AUSCULTATION - normal effort, no stridor    NEUROLOGIC  MENTAL STATUS - alert, interactive CRANIAL NERVES - normal grossly        Procedure(s):  None          Assessment:      Problem List Items Addressed This Visit    None  Visit Diagnoses         Chronic nasopharyngitis    -  Primary      Acute recurrent sinusitis, unspecified location                         Plan:      Continue with rinses and fluticasone/Flonase.  Takes Xyzal/levo cetirizine only if helpful for sneezing and itching.  Discontinue the Astelin/azelastine as this maybe causing increasing dryness and irritation of the throat.  Add some mupirocin ointment as prescribed to the rinses for a week.    Complete laboratory testing looking up blood counts, kidney function, and immunologic testing of antibody levels for evaluation of immunosuppressed state.      Afterwards complete high-resolution CT scan of the neck to evaluate the area behind the nose (nasopharynx) for chronic infection cyst or lesion.      Complete a 2 week course of the cefdinir which was beneficial previously as well as a short dose of oral steroids as prescribed.      Follow up in the office in [place] in the next 3-4 weeks.      Scheduled to be seen in the [place] [facility] [place] [facility] office with the next sinus infection that is otherwise going to be treated with antibiotics.  Try not to take the antibiotics and instead come into the office for decongested endoscopic nasal examination and cultures.    Return with any worsening of symptoms, failure to improve, or any other concerns for further evaluation and  treatment.      The evaluation, treatment, and documentation was performed per Richard Lorenzana MD via Telemedicine AudioVisual using the secure [employer] software platform with two-way audio/video. The provider was located off-site and the patient is located in [place]. The aforementioned video software was utilized to document the relevant history and physical exam.        Voice recognition software was used in the creation of this note/communication and any sound-alike errors which may have occurred from its use should be taken in context when interpreting.  If such errors prevent a clear understanding of the note/communication, please contact the office for clarification.

## 2025-03-28 ENCOUNTER — TELEPHONE (OUTPATIENT)
Dept: OTOLARYNGOLOGY | Facility: CLINIC | Age: 45
End: 2025-03-28
Payer: COMMERCIAL

## 2025-03-28 NOTE — TELEPHONE ENCOUNTER
Called pt and scheduled labs and CT ordered by Dr. Lorenzana at her virtual visit yesterday. Pt already had a lab appt today so I linked the orders to that visit. CT scheduled for 4/7/25 at [facility]. Scheduled follow up with Dr. Lorenzana for 4/25/25 at [facility] [place] [place]. Thanks, Eva

## 2025-03-30 ENCOUNTER — PATIENT MESSAGE (OUTPATIENT)
Dept: ORTHOPEDICS | Facility: CLINIC | Age: 45
End: 2025-03-30
Payer: COMMERCIAL

## 2025-03-31 ENCOUNTER — PATIENT MESSAGE (OUTPATIENT)
Dept: PREADMISSION TESTING | Facility: HOSPITAL | Age: 45
End: 2025-03-31

## 2025-03-31 ENCOUNTER — ANESTHESIA EVENT (OUTPATIENT)
Dept: SURGERY | Facility: HOSPITAL | Age: 45
End: 2025-03-31
Payer: COMMERCIAL

## 2025-03-31 ENCOUNTER — HOSPITAL ENCOUNTER (OUTPATIENT)
Dept: PREADMISSION TESTING | Facility: HOSPITAL | Age: 45
Discharge: HOME | End: 2025-03-31
Attending: NURSE PRACTITIONER
Payer: COMMERCIAL

## 2025-03-31 RX ORDER — LIDOCAINE HYDROCHLORIDE 10 MG/ML
1 INJECTION, SOLUTION EPIDURAL; INFILTRATION; INTRACAUDAL; PERINEURAL ONCE
Status: CANCELLED | OUTPATIENT
Start: 2025-03-31 | End: 2025-03-31

## 2025-03-31 NOTE — DISCHARGE INSTRUCTIONS

## 2025-03-31 NOTE — PRE-PROCEDURE INSTRUCTIONS
.    Allergies, medical, surgical, family and psychosocial histories reviewed with patient. Periop plan of care reviewed. Preop instructions given, including medications to take and to hold. Hibiclens soap and instructions on use given. Time allotted for questions to be addressed.      Arrival time 0615.    Surgery instructions reviewed and surgery booklet sent or emailed to the patient via the portal.     Patient was instructed to reach out to physician monitoring her diabetes to get instructions on what to do with insulin pump the day of surgery.

## 2025-03-31 NOTE — ANESTHESIA PREPROCEDURE EVALUATION
"                                                                                                             2025  Leona Tyler is a 44 y.o., female scheduled for  RELEASE, TRIGGER FINGER (Right: Hand) 25.  Patient's Dexcom malfunctioned last night according to the patient and did not receive her nighttime insulin.   this am.  She gave herself subq insulin prior to arrival.    Past Medical History:   Diagnosis Date    Diabetes mellitus type I      Past Surgical History:   Procedure Laterality Date    CARPAL TUNNEL RELEASE Right      SECTION      MYOMECTOMY       Review of patient's allergies indicates:  No Known Allergies    Current Outpatient Medications   Medication Instructions    azelastine (ASTELIN) 137 mcg, Nasal, 2 times daily    BD IZZY 2ND GEN PEN NEEDLE 32 gauge x 5/32" Ndle USE AND DISCARD 1 PEN      NEEDLE 6 TIMES DAILY    blood sugar diagnostic Strp To check BG 4 times daily, to use with insurance preferred meter    blood-glucose meter kit To check BG 4 times daily, to use with insurance preferred meter    blood-glucose sensor (DEXCOM G6 SENSOR) Janny Use as directed. Change sensor every 10 days.    blood-glucose transmitter (DEXCOM G6 TRANSMITTER) Janny Use as directed. Change transmitter every 3 months.    cefdinir (OMNICEF) 300 mg, Oral, 2 times daily    glucagon (BAQSIMI) 3 mg/actuation Spry Use as needed for severe hypoglycemia    ibuprofen (ADVIL,MOTRIN) 800 mg, Oral, Every 8 hours PRN    ILET INFUSION KIT-INSET 23" Cmpk CHANGE cartridge AND infusion set every 1 & 1/2 TO 2 days as directed    insulin aspart U-100 (NOVOLOG FLEXPEN U-100 INSULIN) 100 unit/mL (3 mL) InPn pen ICR 1:6 TID AC + correction scale. To have in case of insulin pump failureICR 1:6 TID AC + correction scale. To have in case of insulin pump failure. Max TDD of 100 units    insulin aspart, niacinamide, (FIASP FLEXTOUCH U-100 INSULIN) 100 unit/mL (3 mL) InPn ICR 1:6 TID AC + correction scale. To have in " case of insulin pump failureICR 1:6 TID AC + correction scale. To have in case of insulin pump failure. Max TDD of 100 units    insulin aspart, niacinamide, (FIASP U-100 INSULIN) 100 unit/mL Soln To use continuously with Omnipod 5. Max TDD of 100 units    insulin glargine (TOUJEO) (TOUJEO SOLOSTAR U-300 INSULIN) 40 Units, Subcutaneous, Nightly, Titrate up until FBG is <130. Max TDD 60 units.  To have as backup insulin in case of pump failure    insulin glargine U-100, Lantus, (LANTUS SOLOSTAR U-100 INSULIN) 100 unit/mL (3 mL) InPn pen Inject 40 Units into the skin every evening. Titrate up until FBG is <130. Max TDD 50 units. To have as backup insulin in case of pump failure    insulin pump cart,automated,BT (OMNIPOD 5 G6 PODS, GEN 5,) Crtg 1 Device, Subcutaneous, Every 48 hours    lancets Misc To check BG 4 times daily, to use with insurance preferred meter    levocetirizine (XYZAL) 5 MG tablet 1 tablet, Nightly    methylPREDNISolone (MEDROL DOSEPACK) 4 mg tablet use as directed    OMNIPOD 5 G6-G7 PODS, GEN 5, Crtg SMARTSI Device SUB-Q Every Other Day    ONETOUCH DELICA PLUS LANCET 33 gauge Misc Apply topically.    ONETOUCH VERIO FLEX METER Misc USE TO CHECK BLOOD GLUSCOSE 4 TIMES PER DAY    OZEMPIC 1 mg, Subcutaneous, Every 7 days    pravastatin (PRAVACHOL) 10 mg, Oral, Daily    valACYclovir (VALTREX) 1,000 mg, Oral, Daily, Start after 7-day course, for suppression.       Pre-op Assessment    I have reviewed the Patient Summary Reports.     I have reviewed the Nursing Notes.    I have reviewed the Medications.     Review of Systems  Anesthesia Hx:              Personal Hx of Anesthesia complications, Post-Operative Nausea/Vomiting                    Social:  Non-Smoker, Social Alcohol Use       Cardiovascular:  Cardiovascular Normal Exercise tolerance: good   Denies Pacemaker.        Denies Angina.                                    Pulmonary:  Pulmonary Normal      Denies Shortness of breath.                   Hepatic/GI:         Taking GLP-1 Agonists            Neurological:  Neurology Normal Denies TIA.  Denies CVA.    Denies Seizures.                                Endocrine:  Diabetes, type 1, using insulin   Insulin pump in use Diabetes                    Obesity / BMI > 30      Physical Exam  General: Cooperative and Oriented    Airway:  Mallampati: II   Mouth Opening: Normal  TM Distance: Normal  Tongue: Normal  Neck ROM: Normal ROM    Dental:  Intact      Lab Results   Component Value Date    WBC 5.87 10/29/2024    HGB 14.9 10/29/2024    HCT 43.8 10/29/2024     (H) 10/29/2024    CHOL 208 (H) 10/29/2024    TRIG 65 10/29/2024    HDL 48 10/29/2024    ALT 20 10/29/2024    AST 16 10/29/2024     10/29/2024    K 4.3 10/29/2024     10/29/2024    CREATININE 1.1 10/29/2024    BUN 13 10/29/2024    CO2 25 10/29/2024    TSH 1.331 10/29/2024    HGBA1C 7.1 (H) 10/29/2024     No results found for this or any previous visit.      Anesthesia Plan  Type of Anesthesia, risks & benefits discussed:    Anesthesia Type: Gen Natural Airway  Intra-op Monitoring Plan: Standard ASA Monitors  Post Op Pain Control Plan: multimodal analgesia  Induction:  IV  Informed Consent: Informed consent signed with the Patient and all parties understand the risks and agree with anesthesia plan.  All questions answered.   ASA Score: 2  Day of Surgery Review of History & Physical: H&P Update referred to the surgeon/provider.  Anesthesia Plan Notes: Anesthesia consent to be signed prior to surgery 4/8/25      Ready For Surgery From Anesthesia Perspective.     .

## 2025-04-01 ENCOUNTER — RESULTS FOLLOW-UP (OUTPATIENT)
Dept: DIABETES | Facility: CLINIC | Age: 45
End: 2025-04-01

## 2025-04-02 ENCOUNTER — PATIENT MESSAGE (OUTPATIENT)
Dept: OBSTETRICS AND GYNECOLOGY | Facility: CLINIC | Age: 45
End: 2025-04-02
Payer: COMMERCIAL

## 2025-04-08 ENCOUNTER — ANESTHESIA (OUTPATIENT)
Dept: SURGERY | Facility: HOSPITAL | Age: 45
End: 2025-04-08
Payer: COMMERCIAL

## 2025-04-08 ENCOUNTER — HOSPITAL ENCOUNTER (OUTPATIENT)
Facility: HOSPITAL | Age: 45
Discharge: HOME | End: 2025-04-08
Attending: ORTHOPAEDIC SURGERY | Admitting: ORTHOPAEDIC SURGERY
Payer: COMMERCIAL

## 2025-04-08 DIAGNOSIS — E10.65 TYPE 1 DIABETES MELLITUS WITH HYPERGLYCEMIA: ICD-10-CM

## 2025-04-08 DIAGNOSIS — Z01.818 PRE-OP EVALUATION: Primary | ICD-10-CM

## 2025-04-08 DIAGNOSIS — M65.331 TRIGGER MIDDLE FINGER OF RIGHT HAND: ICD-10-CM

## 2025-04-08 LAB
B-HCG UR QL: NEGATIVE
CTP QC/QA: YES
POCT GLUCOSE: 223 MG/DL (ref 70–110)

## 2025-04-08 PROCEDURE — 37000008 HC ANESTHESIA 1ST 15 MINUTES: Performed by: ORTHOPAEDIC SURGERY

## 2025-04-08 PROCEDURE — 37000009 HC ANESTHESIA EA ADD 15 MINS: Performed by: ORTHOPAEDIC SURGERY

## 2025-04-08 PROCEDURE — 63600175 PHARM REV CODE 636 W HCPCS: Mod: JZ,TB

## 2025-04-08 PROCEDURE — 25000003 PHARM REV CODE 250

## 2025-04-08 PROCEDURE — 71000015 HC POSTOP RECOV 1ST HR: Performed by: ORTHOPAEDIC SURGERY

## 2025-04-08 PROCEDURE — 71000016 HC POSTOP RECOV ADDL HR: Performed by: ORTHOPAEDIC SURGERY

## 2025-04-08 PROCEDURE — 63600175 PHARM REV CODE 636 W HCPCS: Performed by: ORTHOPAEDIC SURGERY

## 2025-04-08 PROCEDURE — 63600175 PHARM REV CODE 636 W HCPCS

## 2025-04-08 PROCEDURE — 81025 URINE PREGNANCY TEST: CPT | Performed by: ORTHOPAEDIC SURGERY

## 2025-04-08 PROCEDURE — 36000706: Performed by: ORTHOPAEDIC SURGERY

## 2025-04-08 PROCEDURE — 36000707: Performed by: ORTHOPAEDIC SURGERY

## 2025-04-08 PROCEDURE — 26055 INCISE FINGER TENDON SHEATH: CPT | Mod: F7,,, | Performed by: ORTHOPAEDIC SURGERY

## 2025-04-08 RX ORDER — ONDANSETRON HYDROCHLORIDE 2 MG/ML
4 INJECTION, SOLUTION INTRAVENOUS DAILY PRN
OUTPATIENT
Start: 2025-04-08

## 2025-04-08 RX ORDER — BUPIVACAINE HYDROCHLORIDE 2.5 MG/ML
INJECTION, SOLUTION EPIDURAL; INFILTRATION; INTRACAUDAL; PERINEURAL
Status: DISCONTINUED | OUTPATIENT
Start: 2025-04-08 | End: 2025-04-08 | Stop reason: HOSPADM

## 2025-04-08 RX ORDER — SODIUM CHLORIDE 0.9 % (FLUSH) 0.9 %
10 SYRINGE (ML) INJECTION
OUTPATIENT
Start: 2025-04-08

## 2025-04-08 RX ORDER — CEFAZOLIN SODIUM 1 G/3ML
INJECTION, POWDER, FOR SOLUTION INTRAMUSCULAR; INTRAVENOUS
Status: DISCONTINUED | OUTPATIENT
Start: 2025-04-08 | End: 2025-04-08

## 2025-04-08 RX ORDER — DEXMEDETOMIDINE HYDROCHLORIDE 100 UG/ML
INJECTION, SOLUTION INTRAVENOUS
Status: DISCONTINUED | OUTPATIENT
Start: 2025-04-08 | End: 2025-04-08

## 2025-04-08 RX ORDER — HYDROCODONE BITARTRATE AND ACETAMINOPHEN 5; 325 MG/1; MG/1
1 TABLET ORAL EVERY 4 HOURS PRN
Qty: 12 TABLET | Refills: 0 | Status: SHIPPED | OUTPATIENT
Start: 2025-04-08

## 2025-04-08 RX ORDER — CEFAZOLIN 2 G/1
2 INJECTION, POWDER, FOR SOLUTION INTRAMUSCULAR; INTRAVENOUS
Status: DISCONTINUED | OUTPATIENT
Start: 2025-04-08 | End: 2025-04-08 | Stop reason: HOSPADM

## 2025-04-08 RX ORDER — ONDANSETRON HYDROCHLORIDE 2 MG/ML
INJECTION, SOLUTION INTRAVENOUS
Status: DISCONTINUED | OUTPATIENT
Start: 2025-04-08 | End: 2025-04-08

## 2025-04-08 RX ORDER — LIDOCAINE HYDROCHLORIDE 20 MG/ML
INJECTION INTRAVENOUS
Status: DISCONTINUED | OUTPATIENT
Start: 2025-04-08 | End: 2025-04-08

## 2025-04-08 RX ORDER — KETOROLAC TROMETHAMINE 30 MG/ML
INJECTION, SOLUTION INTRAMUSCULAR; INTRAVENOUS
Status: DISCONTINUED | OUTPATIENT
Start: 2025-04-08 | End: 2025-04-08

## 2025-04-08 RX ORDER — LIDOCAINE HYDROCHLORIDE 10 MG/ML
INJECTION, SOLUTION EPIDURAL; INFILTRATION; INTRACAUDAL; PERINEURAL
Status: DISCONTINUED | OUTPATIENT
Start: 2025-04-08 | End: 2025-04-08 | Stop reason: HOSPADM

## 2025-04-08 RX ORDER — ACETAMINOPHEN 325 MG/1
650 TABLET ORAL EVERY 4 HOURS PRN
Status: DISCONTINUED | OUTPATIENT
Start: 2025-04-08 | End: 2025-04-08 | Stop reason: HOSPADM

## 2025-04-08 RX ORDER — ONDANSETRON 8 MG/1
8 TABLET, ORALLY DISINTEGRATING ORAL EVERY 8 HOURS PRN
Status: DISCONTINUED | OUTPATIENT
Start: 2025-04-08 | End: 2025-04-08 | Stop reason: HOSPADM

## 2025-04-08 RX ORDER — ACETAMINOPHEN 10 MG/ML
INJECTION, SOLUTION INTRAVENOUS
Status: DISCONTINUED | OUTPATIENT
Start: 2025-04-08 | End: 2025-04-08

## 2025-04-08 RX ORDER — OXYCODONE HYDROCHLORIDE 10 MG/1
10 TABLET ORAL EVERY 4 HOURS PRN
Refills: 0 | Status: DISCONTINUED | OUTPATIENT
Start: 2025-04-08 | End: 2025-04-08 | Stop reason: HOSPADM

## 2025-04-08 RX ORDER — PROPOFOL 10 MG/ML
VIAL (ML) INTRAVENOUS
Status: DISCONTINUED | OUTPATIENT
Start: 2025-04-08 | End: 2025-04-08

## 2025-04-08 RX ORDER — HYDROMORPHONE HYDROCHLORIDE 2 MG/ML
0.2 INJECTION, SOLUTION INTRAMUSCULAR; INTRAVENOUS; SUBCUTANEOUS EVERY 5 MIN PRN
Refills: 0 | OUTPATIENT
Start: 2025-04-08

## 2025-04-08 RX ORDER — GLUCAGON 1 MG
1 KIT INJECTION
OUTPATIENT
Start: 2025-04-08

## 2025-04-08 RX ORDER — KETOROLAC TROMETHAMINE 30 MG/ML
30 INJECTION, SOLUTION INTRAMUSCULAR; INTRAVENOUS ONCE
Status: DISCONTINUED | OUTPATIENT
Start: 2025-04-08 | End: 2025-04-08 | Stop reason: HOSPADM

## 2025-04-08 RX ORDER — PROPOFOL 10 MG/ML
VIAL (ML) INTRAVENOUS CONTINUOUS PRN
Status: DISCONTINUED | OUTPATIENT
Start: 2025-04-08 | End: 2025-04-08

## 2025-04-08 RX ORDER — OXYCODONE HYDROCHLORIDE 5 MG/1
5 TABLET ORAL
Refills: 0 | OUTPATIENT
Start: 2025-04-08

## 2025-04-08 RX ADMIN — SODIUM CHLORIDE: 0.9 INJECTION, SOLUTION INTRAVENOUS at 07:04

## 2025-04-08 RX ADMIN — CEFAZOLIN 2 G: 330 INJECTION, POWDER, FOR SOLUTION INTRAMUSCULAR; INTRAVENOUS at 07:04

## 2025-04-08 RX ADMIN — ONDANSETRON 4 MG: 2 INJECTION, SOLUTION INTRAMUSCULAR; INTRAVENOUS at 07:04

## 2025-04-08 RX ADMIN — PROPOFOL 100 MCG/KG/MIN: 10 INJECTION, EMULSION INTRAVENOUS at 07:04

## 2025-04-08 RX ADMIN — LIDOCAINE HYDROCHLORIDE 100 MG: 20 INJECTION, SOLUTION INTRAVENOUS at 07:04

## 2025-04-08 RX ADMIN — KETOROLAC TROMETHAMINE 30 MG: 30 INJECTION, SOLUTION INTRAMUSCULAR; INTRAVENOUS at 08:04

## 2025-04-08 RX ADMIN — ACETAMINOPHEN 1000 MG: 10 INJECTION, SOLUTION INTRAVENOUS at 07:04

## 2025-04-08 RX ADMIN — PROPOFOL 20 MG: 10 INJECTION, EMULSION INTRAVENOUS at 07:04

## 2025-04-08 RX ADMIN — PROPOFOL 50 MG: 10 INJECTION, EMULSION INTRAVENOUS at 07:04

## 2025-04-08 RX ADMIN — DEXMEDETOMIDINE 12 MCG: 200 INJECTION, SOLUTION INTRAVENOUS at 07:04

## 2025-04-08 NOTE — TRANSFER OF CARE
"Anesthesia Transfer of Care Note    Patient: Leona Tyler    Procedure(s) Performed: Procedure(s) (LRB):  RELEASE, TRIGGER FINGER (Right)    Patient location: [facility]    Anesthesia Type: MAC    Transport from OR: Transported from OR on room air with adequate spontaneous ventilation    Post pain: adequate analgesia    Post assessment: no apparent anesthetic complications    Post vital signs: stable    Level of consciousness: awake, alert and oriented    Nausea/Vomiting: no nausea/vomiting    Complications: none    Transfer of care protocol was followed      Last vitals: Visit Vitals  /64 (BP Location: Right arm, Patient Position: Lying)   Pulse 67   Temp 36.7 °C (98.1 °F) (Tympanic)   Resp 16   Ht 5' 7" (1.702 m)   Wt 83.9 kg (185 lb)   LMP  (LMP Unknown)   SpO2 100%   Breastfeeding No   BMI 28.98 kg/m²     "

## 2025-04-08 NOTE — ANESTHESIA POSTPROCEDURE EVALUATION
Anesthesia Post Evaluation    Patient: Leona Tyler    Procedure(s) Performed: Procedure(s) (LRB):  RELEASE, TRIGGER FINGER (Right)    Final Anesthesia Type: general      Patient location during evaluation: PACU  Patient participation: Yes- Able to Participate  Level of consciousness: awake and alert, oriented and awake  Post-procedure vital signs: reviewed and stable  Pain management: adequate  Airway patency: patent  LATOSHA mitigation strategies: Multimodal analgesia, Extubation while patient is awake and Verification of full reversal of neuromuscular block  PONV status at discharge: No PONV  Anesthetic complications: no      Cardiovascular status: hemodynamically stable  Respiratory status: spontaneous ventilation and room air  Hydration status: euvolemic  Follow-up not needed.              Vitals Value Taken Time   BP 97/63 04/08/25 08:40   Temp 36.5 °C (97.7 °F) 04/08/25 08:25   Pulse 72 04/08/25 08:40   Resp 18 04/08/25 08:40   SpO2 96 % 04/08/25 08:40         No case tracking events are documented in the log.      Pain/Perry Score: Perry Score: 10 (4/8/2025  8:40 AM)

## 2025-04-08 NOTE — OP NOTE
[facility] - Surgery (Hospital)  Operative Note      Date of Procedure: 4/8/2025     Procedure: Procedure(s) (LRB):  RELEASE, TRIGGER FINGER (Right)     Surgeons and Role:     * Smooth Lamar Jr., MD - Primary    Assisting Surgeon: None    Pre-Operative Diagnosis: Trigger middle finger of right hand [M65.331]    Post-Operative Diagnosis: Post-Op Diagnosis Codes:     * Trigger middle finger of right hand [M65.331]    Anesthesia: Local MAC    Operative Findings (including complications, if any):  Triggering right middle finger    Description of Technical Procedures:     Preop Diagnosis: Triggering of the right middle finger.      Postop diagnosis: Same.      Operative procedure: Trigger release right middle finger.      Surgeon: Willard.      First assistant:  Naif.      Anesthesia: Mac.      EBL: Minimal.      Specimen: None.      Complications: None.      Operative procedure in detail as follows:    After operative consent was obtained patient brought the operating room placed supine operating room table.  Anesthesia by IV sedation followed by injection of xylocaine Marcaine combination.  Tourniquet applied right arm and the right upper extremity prepped and draped out in the normal sterile fashion.  Esmarch used to exsanguinated the limb tourniquet inflated 225 mmHg.   Following this a volar oblique incision made at the base of the right middle finger with a 15 blade.  Careful dissection used to expose the A1 pulley which was released longitudinally with the [place] blade and scissors.  Digital nerves protected.  Tendons were inspected and noted to be intact.  Range of motion then checked noted to be full without triggering.  The wound irrigated and closed with interrupted 5 O nylon horizontal mattress suture on the skin.  Sterile dressing applied followed by light wrap tourniquet deflated patient brought to the recovery room in stable condition.  All sponge needle counts reported as correct.  No complications           Significant Surgical Tasks Conducted by the Assistant(s), if Applicable: retraction    Estimated Blood Loss (EBL): * No values recorded between 4/8/2025  7:46 AM and 4/8/2025  8:24 AM *           Implants: * No implants in log *    Specimens:   Specimen (24h ago, onward)      None           * No specimens in log *           Condition: Good    Disposition: PACU - hemodynamically stable.    Attestation: I was present and scrubbed for the entire procedure.    Discharge Note    OUTCOME: Patient tolerated treatment/procedure well without complication and is now ready for discharge.    DISPOSITION: Home or Self Care    FINAL DIAGNOSIS:  Trigger middle finger of right hand    FOLLOWUP: In clinic    DISCHARGE INSTRUCTIONS:    Discharge Procedure Orders   HEMOGLOBIN A1C   Standing Status: Future Number of Occurrences: 1 Standing Exp. Date: 06/12/26     Order Specific Question Answer Comments   Send normal result to authorizing provider's In Basket if patient is active on MyChart: Yes      BASIC METABOLIC PANEL   Standing Status: Future Number of Occurrences: 1 Standing Exp. Date: 06/12/26     Order Specific Question Answer Comments   Send normal result to authorizing provider's In Basket if patient is active on MyChart: Yes      Diet general     Call MD for:  temperature >100.4     Call MD for:  persistent nausea and vomiting     Call MD for:  severe uncontrolled pain     Keep surgical extremity elevated     Remove dressing in 72 hours

## 2025-04-10 VITALS
SYSTOLIC BLOOD PRESSURE: 98 MMHG | TEMPERATURE: 98 F | BODY MASS INDEX: 29.03 KG/M2 | DIASTOLIC BLOOD PRESSURE: 62 MMHG | OXYGEN SATURATION: 97 % | WEIGHT: 185 LBS | HEART RATE: 73 BPM | RESPIRATION RATE: 18 BRPM | HEIGHT: 67 IN

## 2025-04-11 ENCOUNTER — PATIENT MESSAGE (OUTPATIENT)
Dept: ORTHOPEDICS | Facility: CLINIC | Age: 45
End: 2025-04-11
Payer: COMMERCIAL

## 2025-04-14 RX ORDER — INSULIN INFUSION SET/CARTRIDGE
COMBINATION PACKAGE (EA) MISCELLANEOUS
Qty: 50 EACH | Refills: 11 | Status: SHIPPED | OUTPATIENT
Start: 2025-04-14

## 2025-04-15 ENCOUNTER — OFFICE VISIT (OUTPATIENT)
Dept: OBSTETRICS AND GYNECOLOGY | Facility: CLINIC | Age: 45
End: 2025-04-15
Payer: COMMERCIAL

## 2025-04-15 VITALS
DIASTOLIC BLOOD PRESSURE: 67 MMHG | WEIGHT: 186.94 LBS | SYSTOLIC BLOOD PRESSURE: 105 MMHG | HEIGHT: 67 IN | BODY MASS INDEX: 29.34 KG/M2

## 2025-04-15 DIAGNOSIS — Z98.890 S/P ENDOMETRIAL ABLATION: Primary | ICD-10-CM

## 2025-04-15 DIAGNOSIS — N89.8 VAGINAL DISCHARGE: ICD-10-CM

## 2025-04-15 PROCEDURE — 99999 PR PBB SHADOW E&M-EST. PATIENT-LVL IV: CPT | Mod: PBBFAC,,, | Performed by: OBSTETRICS & GYNECOLOGY

## 2025-04-15 PROCEDURE — 81515 NFCT DS BV&VAGINITIS DNA ALG: CPT | Performed by: OBSTETRICS & GYNECOLOGY

## 2025-04-15 PROCEDURE — 3078F DIAST BP <80 MM HG: CPT | Mod: CPTII,S$GLB,, | Performed by: OBSTETRICS & GYNECOLOGY

## 2025-04-15 PROCEDURE — 99213 OFFICE O/P EST LOW 20 MIN: CPT | Mod: S$GLB,,, | Performed by: OBSTETRICS & GYNECOLOGY

## 2025-04-15 PROCEDURE — 3074F SYST BP LT 130 MM HG: CPT | Mod: CPTII,S$GLB,, | Performed by: OBSTETRICS & GYNECOLOGY

## 2025-04-15 PROCEDURE — 3008F BODY MASS INDEX DOCD: CPT | Mod: CPTII,S$GLB,, | Performed by: OBSTETRICS & GYNECOLOGY

## 2025-04-15 PROCEDURE — 1159F MED LIST DOCD IN RCRD: CPT | Mod: CPTII,S$GLB,, | Performed by: OBSTETRICS & GYNECOLOGY

## 2025-04-15 PROCEDURE — 3044F HG A1C LEVEL LT 7.0%: CPT | Mod: CPTII,S$GLB,, | Performed by: OBSTETRICS & GYNECOLOGY

## 2025-04-15 RX ORDER — CETIRIZINE HYDROCHLORIDE 10 MG/1
TABLET ORAL
COMMUNITY

## 2025-04-15 NOTE — PROGRESS NOTES
"Subjective     Patient ID: Leona Tyler is a 44 y.o. female.    Chief Complaint:  Post-op Evaluation      History of Present Illness  HPI  44 y.o.  presents for f/u, s/p endometrial ablation on 25.  Overall doing well today.  She does report cramping and bleeding initially after surgery (one week out), but has now improved.  No other complaints today.  No bowel or urinary complaints.  No intercourse since surgery.  No fevers/chills.     GYN & OB History  No LMP recorded (lmp unknown). Patient has had an implant.   Date of Last Pap: 2023    OB History    Para Term  AB Living   2 2 0 2  3   SAB IAB Ectopic Multiple Live Births      1 3      # Outcome Date GA Lbr Pillo/2nd Weight Sex Type Anes PTL Lv   2A  19    M CS-Classical   JEFF   2B  19    M CS-Classical   JEFF   1  17    M CS-Unspec  Y JEFF       Review of Systems  Review of Systems   Constitutional:  Negative for chills and fever.   Respiratory:  Negative for shortness of breath.    Cardiovascular:  Negative for chest pain.   Gastrointestinal:  Negative for abdominal pain.   Genitourinary:  Positive for vaginal discharge. Negative for pelvic pain and vaginal bleeding.          Objective   Physical Exam    /67   Ht 5' 7" (1.702 m)   Wt 84.8 kg (186 lb 15.2 oz)   LMP  (LMP Unknown)   BMI 29.28 kg/m²     Gen: NAD  Resp: Normal respiratory effort  Pelvic: Normal-appearing external female genitalia.  Small amount of yellow discharge noted, no bleeding.  Cervix closed.  Uterus small, mobile, nontender.  No adnexal masses or tenderness.    Ext: normal ROM  Psych: appropriate affect  Neuro: grossly intact    Pathology:       Final Diagnosis   Fragments of secretory endometrium, some fragments have thickened vessels suggestive of benign polyp formation.  No atypical hyperplasia or malignancy identified.          Assessment and Plan     Leona was seen today for post-op evaluation.    Diagnoses " and all orders for this visit:    S/P endometrial ablation    Vaginal discharge  -     Vaginosis Screen by DNA Probe; Future          Plan:  Doing well post-operatively.   Images, pathology, and surgery discussed.   Vaginosis pending.   Cleared to resume normal activity.   Counseling done, precautions given, all questions answered.  RTC June for annual and f/u cycles, or prn.

## 2025-04-17 LAB
BACTERIAL VAGINOSIS DNA (OHS): NOT DETECTED
CANDIDA GLABRATA/KRUSEI DNA (OHS): NOT DETECTED
CANDIDA SPECIES DNA (OHS): NOT DETECTED
TRICHOMONAS VAGINALIS DNA (OHS): NOT DETECTED

## 2025-04-22 ENCOUNTER — OFFICE VISIT (OUTPATIENT)
Dept: ORTHOPEDICS | Facility: CLINIC | Age: 45
End: 2025-04-22
Payer: COMMERCIAL

## 2025-04-22 DIAGNOSIS — Z98.890 S/P TRIGGER FINGER RELEASE: Primary | ICD-10-CM

## 2025-04-22 PROCEDURE — 3044F HG A1C LEVEL LT 7.0%: CPT | Mod: CPTII,S$GLB,, | Performed by: PHYSICIAN ASSISTANT

## 2025-04-22 PROCEDURE — 99999 PR PBB SHADOW E&M-EST. PATIENT-LVL IV: CPT | Mod: PBBFAC,,, | Performed by: PHYSICIAN ASSISTANT

## 2025-04-22 PROCEDURE — 1160F RVW MEDS BY RX/DR IN RCRD: CPT | Mod: CPTII,S$GLB,, | Performed by: PHYSICIAN ASSISTANT

## 2025-04-22 PROCEDURE — 1159F MED LIST DOCD IN RCRD: CPT | Mod: CPTII,S$GLB,, | Performed by: PHYSICIAN ASSISTANT

## 2025-04-22 PROCEDURE — 99024 POSTOP FOLLOW-UP VISIT: CPT | Mod: S$GLB,,, | Performed by: PHYSICIAN ASSISTANT

## 2025-04-22 RX ORDER — VALACYCLOVIR HYDROCHLORIDE 1 G/1
1000 TABLET, FILM COATED ORAL DAILY
Qty: 90 TABLET | Refills: 3 | Status: SHIPPED | OUTPATIENT
Start: 2025-04-22

## 2025-04-28 ENCOUNTER — PATIENT MESSAGE (OUTPATIENT)
Dept: ORTHOPEDICS | Facility: CLINIC | Age: 45
End: 2025-04-28
Payer: COMMERCIAL

## 2025-05-01 ENCOUNTER — PATIENT MESSAGE (OUTPATIENT)
Dept: OBSTETRICS AND GYNECOLOGY | Facility: CLINIC | Age: 45
End: 2025-05-01
Payer: COMMERCIAL

## 2025-05-01 ENCOUNTER — TELEPHONE (OUTPATIENT)
Dept: ORTHOPEDICS | Facility: CLINIC | Age: 45
End: 2025-05-01
Payer: COMMERCIAL

## 2025-05-06 ENCOUNTER — PATIENT MESSAGE (OUTPATIENT)
Dept: OBSTETRICS AND GYNECOLOGY | Facility: CLINIC | Age: 45
End: 2025-05-06

## 2025-05-09 ENCOUNTER — OFFICE VISIT (OUTPATIENT)
Dept: OTOLARYNGOLOGY | Facility: CLINIC | Age: 45
End: 2025-05-09
Payer: COMMERCIAL

## 2025-05-09 ENCOUNTER — PATIENT MESSAGE (OUTPATIENT)
Dept: OTOLARYNGOLOGY | Facility: CLINIC | Age: 45
End: 2025-05-09
Payer: COMMERCIAL

## 2025-05-09 VITALS
HEART RATE: 90 BPM | WEIGHT: 191 LBS | SYSTOLIC BLOOD PRESSURE: 114 MMHG | BODY MASS INDEX: 29.98 KG/M2 | HEIGHT: 67 IN | DIASTOLIC BLOOD PRESSURE: 80 MMHG

## 2025-05-09 DIAGNOSIS — J34.3 NASAL TURBINATE HYPERTROPHY: ICD-10-CM

## 2025-05-09 DIAGNOSIS — J37.0 CHRONIC LARYNGITIS: ICD-10-CM

## 2025-05-09 DIAGNOSIS — R09.82 POSTNASAL DRIP: ICD-10-CM

## 2025-05-09 DIAGNOSIS — M79.10 MYALGIA: ICD-10-CM

## 2025-05-09 DIAGNOSIS — J32.9 RECURRENT SINUSITIS: ICD-10-CM

## 2025-05-09 DIAGNOSIS — R53.83 FATIGUE, UNSPECIFIED TYPE: ICD-10-CM

## 2025-05-09 DIAGNOSIS — R09.89 CHRONIC THROAT CLEARING: ICD-10-CM

## 2025-05-09 DIAGNOSIS — J01.91 ACUTE RECURRENT SINUSITIS, UNSPECIFIED LOCATION: Primary | ICD-10-CM

## 2025-05-09 PROCEDURE — 99999 PR PBB SHADOW E&M-EST. PATIENT-LVL V: CPT | Mod: PBBFAC,,, | Performed by: OTOLARYNGOLOGY

## 2025-05-09 NOTE — PROCEDURES
"Nasal/sinus endoscopy    Date/Time: 5/9/2025 3:00 PM    Time out: Immediately prior to procedure a "time out" was called to verify the correct patient, procedure, equipment, support staff and site/side marked as required.    Performed by: Richard Lorenzana MD  Authorized by: Richard Lorenzana MD    Consent Done?:  Yes (Verbal)  Anesthesia:     Local anesthetic:  Afrin and lidocaine 4%    Local Atomizer?      Type of Endoscope:  Flexible  Nose:     Procedure Performed:  Nasal Endoscopy  Nasopharynx:      Afrin and lidocaine.  Slimline scope.  Normal other than some mild clear drip. Minimal bland adenoid type tissue.  Moderate interarytenoid and post cricoid mucosal edema/reduncency.  No erythema, lesion or pooling.    "

## 2025-05-09 NOTE — PROGRESS NOTES
[facility]    Subjective:      Patient: Leona Tyler Patient PCP: Beba, Primary Doctor         :  1980     Sex:  female      MRN:  [id number]          Date of Visit: 2025      Chief Complaint: Sore Throat (SNOT 24/45) and Nasal Congestion (And drip)      Patient ID: Leona Tyler is a 44 y.o. female       2025 follow up patient visit patient seen by tele visit 6 weeks ago here for routine follow up.  Put back on cefdinir for 2 weeks with recommendations for continued oral and nasal antihistamines and nasal steroids in addition to sinus rinses.  Reports prior negative ImmunoCAP testing.  Snot 22 score is elevated at 24 previously at our initial visit in January was elevated at 74.  Completed HR CT neck with contrast to evaluate for nasopharyngeal lesion/cyst.  No focal abnormality reported by radiology.  My review of the images today shows no appreciable mucoperiosteal disease of the paranasal sinuses.  Septum is generally midline.  Soft tissue imaging in 3 planes reviewed does not show a definitive Thornwaldt's cyst for appreciable nasopharyngeal cyst.    2025 aerobic culture positive for Haemophilus influenzae beta lactamase negative.    Initial Streptococcus pneumoniae antigen testing levels noted    Component  Ref Range & Units (hover) 1 mo ago   Serotype 1 (1) 14.3   Serotype 2(2) 8.6   Serotype 3 (3) 0.6   Serotype 4 (4) 0.3   Serotype 5 (5) 2.8   Serotype 8 (8) 5.6   Serotype 9N (9) 0.4   Serotype 12F (12) SEE COMMENTS   Comment: No result available due to non-linear dilution response for  this serotype. See Interpretation.   Serotype 14 (14) 1.1   Serotype 17F (17) 0.8   Serotype 19F (19) 2.3   Serotype 20 (20) 5.4   Serotype 22F (22) 1.1   Serotype 23F (23) 0.8   Serotype 6B (26) 1.3   Serotype 10A (34) 0.7   Serotype 11A (43) 0.2   Serotype 7F (51) 3.2   Serotype 15B (54) 1.1   Serotype 18C (56) 1.9   Serotype 19A (57) 5.4   Serotype 9V (68) 0.2   Serotype 33F (70)  12.6   Interpretation SEE COMMENTS   Comment: Unable to quantitate serotype 12F (12) due to nonlinear  dilution response of patient sample. Overall interpretation  of pneumococcal antibody serology panel can be based on the  reported 22 serotypes. Evaluation of the immune response  following pneumococcal vaccination can be assessed by  measuring serotype-specific Streptococcus pneumonia IgG  antibodies. Either of the following conditions is  consistent with a normal response to Streptococcus  pneumonia vaccination: 1. When comparing pre and  post-vaccination samples, antibody concentrations increased  by at least 2-fold for either >50% of serotypes in children  <6 years of age or >70% of serotypes for individuals >6  years of age. 2. In either a pre- or post-vaccination  sample, antibody concentrations >=1.0 mcg/mL for either  >50% of serotypes for children <6 years of age or >70% of  serotypes for individuals >6 years of age. Results >=1.0  mcg/mL or those showing a >=2-fold change are consistent  with an immune response, but are not necessarily sufficient  to provide protection against infection.       Quantitative IgG IgA and IgM levels normal      IgA Level 222   Comment: IgA Cord Blood Reference Range: <5 mg/dL.   IgG Level 1,326   Comment: IgG Cord Blood Reference Range: 650-1600 mg/dL.   IgM Level 60   Comment: IgM Cord Blood Reference Range: <25 mg/dL.           03/27/2025 AV tele visit follow up patient with continued symptomatic postnasal drip.  She has had some increasing reflux.  She has been on Ozempic for a few months.  She additionally though clearly describes expectorations of green discharge which only transiently improves with antibiotics and steroids.  She denies nasal obstruction, loss of smell, anterior maxillary or dental pressure or pain or any purulence noted specifically when blowing the nose or rinsing.  She is rinsing and using nasal antihistamines oral antihistamines and nasal steroids.   She was unable to keep an appointment with Allergy/immunology for possible immunologic workup.  She has a history of negative ImmunoCAP testing for allergy done at outside ENT office with no records for review.      She primarily is complaining of a persistent left-sided greater than right irritation discomfort that runs from the back of the nose to the upper throat to the level of the hyoid.  No blood.  No voice change or trouble swallowing.  No neck mass.    01/31/2025 4 week follow up visit patient seen 4 weeks ago with the acute on chronic sinus symptoms treated with three-week of cefdinir and a Medrol Dosepak if necessary with CT scanning completed just 3 days ago reviewed today shows some enlargement of the inferior turbinate and nasal septal swell body, narrowing of the ostiomeatal complex but no significant mucoperiosteal thickening, any destructive process, or fluid levels consistent with active sinus disease.  Snot 22 score has improved from 74 down to 41.    Patient felt more improvement with the methylprednisolone which she actually started at the beginning of her three-week of cefdinir.  This was completed only a few days ago in in the last week he has already started to feel some increasing degree of runny nose postnasal drip and congestion.  No active facial pressure or any purulent drainage which seems to be the Hallmark of her progression to antibiotic treated sinusitis.  Seeing Dr. Small with allergy/immunology at [facility] on Monday.  No known prior pneumococcal vaccination.      01/28/2025 high-resolution CT sinuses images reviewed as above.         01/03/2025 initial patient consultation Patient is a  lifelong NON-smoker with a past medical history of T1DM on insulin pump referred to me by Danita Lipscomb in consultation for sinusitis.  SNOT-22 of 74.  Seen at [facility] last year.  Treated with Doxy.  CT ordered, not completed.  Intermittent purulence with chronic congestion, pressure and drainage x 2  years.  Multiple courses of antibiotics, medrol packs and steroid shots all with only transient improvement.    Labs:  WBC   Date Value Ref Range Status   2025 8.92 3.90 - 12.70 K/uL Final     HGB   Date Value Ref Range Status   2025 14.4 12.0 - 16.0 gm/dL Final     Platelet Count   Date Value Ref Range Status   2025 530 (H) 150 - 450 K/uL Final     Creatinine   Date Value Ref Range Status   2025 0.9 0.5 - 1.4 mg/dL Final     TSH   Date Value Ref Range Status   10/29/2024 1.331 0.400 - 4.000 uIU/mL Final     Hemoglobin A1c   Date Value Ref Range Status   2025 6.8 (H) 4.0 - 5.6 % Final     Comment:     ADA Screening Guidelines:  5.7-6.4%  Consistent with prediabetes  >=6.5%  Consistent with diabetes    High levels of fetal hemoglobin interfere with the HbA1C  assay. Heterozygous hemoglobin variants (HbS, HgC, etc)do  not significantly interfere with this assay.   However, presence of multiple variants may affect accuracy.       Past Medical History  She has a past medical history of Diabetes mellitus type I.    Family / Surgical / Social History  Her family history includes Breast cancer in her maternal aunt; Diabetes in her father, maternal grandmother, mother, and paternal grandmother; Ovarian cancer in her paternal aunt.    Past Surgical History:   Procedure Laterality Date    CARPAL TUNNEL RELEASE Right      SECTION      ENDOMETRIAL ABLATION  2025    and d&c    ENDOMETRIAL ABLATION N/A 2025    Procedure: ABLATION, ENDOMETRIUM;  Surgeon: Crissy Ponce MD;  Location: [facility] OR;  Service: OB/GYN;  Laterality: N/A;    HYSTEROSCOPY WITH DILATION AND CURETTAGE OF UTERUS N/A 2025    Procedure: HYSTEROSCOPY, WITH DILATION AND CURETTAGE OF UTERUS;  Surgeon: Crissy Ponce MD;  Location: [facility] OR;  Service: OB/GYN;  Laterality: N/A;    MYOMECTOMY      TRIGGER FINGER RELEASE Right 2025    Procedure: RELEASE, TRIGGER FINGER;  Surgeon: Smooth Lamar Jr.,  "MD;  Location: [facility] OR;  Service: Orthopedics;  Laterality: Right;       Social History     Tobacco Use    Smoking status: Never    Smokeless tobacco: Never   Substance and Sexual Activity    Alcohol use: Yes     Alcohol/week: 2.0 standard drinks of alcohol     Types: 2 Glasses of wine per week     Comment: occ    Drug use: Never    Sexual activity: Yes     Partners: Male       Medications  She has a current medication list which includes the following prescription(s): azelastine, bd vikram 2nd gen pen needle, blood sugar diagnostic, blood-glucose meter, dexcom g6 sensor, dexcom g6 transmitter, cetirizine, baqsimi, hydrocodone-acetaminophen, ibuprofen, ibuprofen, ilet infusion kit-inset 23", insulin aspart u-100, fiasp flextouch u-100 insulin, fiasp u-100 insulin, insulin glargine (toujeo), omnipod 5 g6 pods (gen 5), lancets, levocetirizine, omnipod 5 g6-g7 pods (gen 5), onetouch delica plus lancet, oxycodone-acetaminophen, pravastatin, ozempic, and valacyclovir, and the following Facility-Administered Medications: etonogestrel.      Allergies  Review of patient's allergies indicates:  No Known Allergies    All medications, allergies, and past history have been reviewed.    Objective:      Vitals:      4/15/2025    11:36 AM 4/22/2025     1:27 PM 5/9/2025     3:03 PM   Vitals - 1 value per visit   SYSTOLIC 105  114   DIASTOLIC 67  80   Pulse   90   Weight (lb) 186.95  191.03   Weight (kg) 84.8  86.65   Height 5' 7" (1.702 m)  5' 7" (1.702 m)   BMI (Calculated) 29.3  29.9   Pain Score Zero Zero Six       Body surface area is 2.02 meters squared.    Physical Exam:      GENERAL  APPEARANCE -  alert, appears stated age, cooperative, and frustrated in NAD  BARRIER(S) TO COMMUNICATION -  none VOICE - appropriate for age and gender    INTEGUMENTARY  no suspicious head and neck lesions    HEENT  HEAD: Normocephalic, without obvious abnormality, atraumatic  FACE: INSPECTION - Symmetric, no signs of trauma, no suspicious " lesion(s)      STRENGTH - facial symmetry intact     PALPATION -  No masses     SALIVARY GLANDS - non-tender with no appreciable mass    NECK/THYROID: normal atraumatic, no neck masses, normal thyroid, no jvd    EYES  Normal occular alignment and mobility with no visible nystagmus at rest    EARS/NOSE/MOUTH/THROAT  EARS  PINNAE AND EXTERNAL EARS - no suspicious lesion OTOSCOPIC EXAM (surgical microscopy was not used for visualization/instrumentation): EAR EXAM - Normal ear canals, tympanic membranes and mobility, and middle ear spaces bilaterally.  HEARING - grossly intact to voice/finger rub    NOSE AND SINUSES  EXTERNAL NOSE - Grossly normal for age/sex  SEPTUM - normal/no obstruction on anterior exam without decongestion TURBINATES - within normal limits MUCOSA - within normal limits     MOUTH AND THROAT   ORAL CAVITY, LIPS, TEETH, GUMS & TONGUE - moist, no suspicious lesions  OROPHARYNX /TONSILS/PHARYNGEAL WALLS/HYPOPHARYNX - no erythema or exudates  NASOPHARYNX - limited mirror exam - unable to visualize due to anatomy/gag  LARYNX -  - limited mirror exam - unable to visualize due to anatomy/gag      CHEST AND LUNG   INSPECTION & AUSCULTATION - normal effort, no stridor    CARDIOVASCULAR  AUSCULTATION & PERIPHERAL VASCULAR - regular rate and rhythm.    NEUROLOGIC  MENTAL STATUS - alert, interactive CRANIAL NERVES - normal    LYMPHATIC  HEAD AND NECK - non-palpable; SUPRACLAVICULAR - deferred          Procedure(s):  Nasal endoscopy performed.  See procedure note.    Afrin and lidocaine.  Slimline scope.  Normal other than some mild clear drip. Minimal bland adenoid type tissue.  Moderate interarytenoid and post cricoid mucosal edema/reduncency.  No erythema, lesion or pooling.          Assessment:      Problem List Items Addressed This Visit    None  Visit Diagnoses         Acute recurrent sinusitis, unspecified location    -  Primary      Recurrent sinusitis          Nasal turbinate hypertrophy          Myalgia           Fatigue, unspecified type          Postnasal drip          Chronic laryngitis          Chronic throat clearing                           Plan:      CT sinuses at the time of the next antibiotic treated sinus infection to determine which if any sinuses would benefit from dilation or surgery for recurrent acute sinusitis.    [facility] Discuss pneumococcal vaccination and repeat antibody testing to assess humoral immune system and other immunologic testing with him.    Consider general medicine or rheumatology evaluation.    For now continue sinus / allergy care measures.    Return with any worsening of symptoms, failure to improve, or any other concerns for further evaluation and treatment.      Voice recognition software was used in the creation of this note/communication and any sound-alike errors which may have occurred from its use should be taken in context when interpreting.  If such errors prevent a clear understanding of the note/communication, please contact the office for clarification.

## 2025-05-09 NOTE — PATIENT INSTRUCTIONS
CT sinuses at the time of the next antibiotic treated sinus infection to determine which if any sinuses would benefit from dilation or surgery for recurrent acute sinusitis.    See Dr. Small with [facility] allergy/[facility]. Discuss pneumococcal vaccination and repeat antibody testing to assess humoral immune system and other immunologic testing with him.    Consider general medicine or rheumatology evaluation.    For now continue sinus / allergy care measures.    Return with any worsening of symptoms, failure to improve, or any other concerns for further evaluation and treatment.      Voice recognition software was used in the creation of this note/communication and any sound-alike errors which may have occurred from its use should be taken in context when interpreting.  If such errors prevent a clear understanding of the note/communication, please contact the office for clarification.      NASAL SALINE    Still saline comes in many preparations including sprays/mists, gels, and rinses.  Different preparations served different purposes.  Saline spray helps to briefly moisturize the nose and help clear mucus.  Saline gels coat the nose for longer protective benefit of keeping the linings the nose moist.  Saline rinses clear the nose and sinuses and a more thorough way in her best used for significant postnasal drip and sinus complaints.  A combination of saline sprays/mists, gels and rinses should be used to address routine nasal clearing and dryness issues as well as flushing for better control of allergy and postnasal drip symptoms.  There is no real risk of over use of nasal saline products.  Saline sprays do not have any of the potential rebound or addiction of nasal decongestant sprays.  Nasal saline sprays and rinses should be used prior to the application of any medicated nasal sprays such as nasal steroids or nasal antihistamine sprays.        INTRANASAL STEROID SPRAYS      Intranasal steroid sprays  are available both by prescription and over-the-counter both in generic and brand name preparations.  They are all very similar in efficacy and side effect profiles.  Over-the-counter and prescription intranasal steroids include fluticasone propionate (Flonase), fluticasone furoate (Sensimist), triamcinolone (Nasacort), and budesonide (Rhinocort).  While these medications are available as prescriptions as well there are few nasal steroids in her available by prescription only and include mometasone (Nasonex), flunisolide (Nasarel), and beclomethasone (QNASL).    Nasal steroids or the foundation of treatment of both allergy and other inflammatory conditions of the nose and sinuses.  They are safe for regular use and while there are many side effects listed most of these are steroid class effects and not typically encountered.  Typical side effects include dryness and even ulceration and bleeding of the nose.  These side effects can be minimized by proper application and proper moisturization with saline and saline gel.    Sometimes changing between 1 brand of nasal steroid and another can result in improved control of symptoms especially after long term use of one particular nasal steroid.    Proper application of the nasal steroid spray is accomplished by spraying towards the I/ear on the same side with the tip of the superior just barely inside the nostril with the chin slightly downward.  Any dripping should be gently inhaled not sniff test backwards into the throat.  Labeled instructions should be followed.        ASTELIN (Azelastine) nasal spray    Astelin is a topical nasal antihistamine which can be of additional benefit in controlling nasal allergy and postnasal drip.  Typically is recommended on an as-needed basis 1-2 sprays each nostril twice daily.  People often find it beneficial at night.  This typically added to her regimen of saline and nasal steroids as a 3rd line agent for as needed use.  Excessive  use can cause excessive dryness and even nose bleeds.  It has a very strong taste which many people find intolerable.  Astelin needs to be stopped 5-7 days prior to any skin allergy testing just like oral antihistamines as it will inhibit the skin response.      SINUS RINSE INSTRUCTIONS    Nasal Saline Irrigation Instructions  You can wash your nasal and sinus passages using nasal saline (salt water) irrigation. This   is simple and effective. Follow the instructions below, as well as the ones provided by your   physician.  Supplies  First, you will need a nasal saline irrigation bottle and rinse solution.   You can purchase nasal rinse kits that include these items (such as   NeilMed®, Ayr®, Simply Saline®, Ocean Complete®) at most drug   stores. You can also make your own saline irrigation solution by   adding kosher (non-iodine) salt and baking soda to distilled water.   Your physician may tell you to add medications like a steroid or   antibiotic to the rinse as needed.  Steps for nasal irrigation  Step 1. Fill the bottle  ? Wash your hands.  ? Fill the irrigation bottle with lukewarm distilled water or boiled water that has cooled.  Step 2. Mix the solution  ? Put the saline and salt packet contents into the bottle.  ? Tighten the top of the bottle and shake it gently to dissolve the mixture.  ? If you are making your own solution:   - Add 1/4 to 1/2 teaspoon of baking soda and 1/8 teaspoon of kosher (non-iodine) salt   into the bottle.   - Tighten the top of the bottle.   - Shake the bottle gently to dissolve the mixture.  Step 3. Get into position  ?  front of the sink.  ? Unless you were instructed to use another position, bend forward.   Then tilt your face down about 45 degrees so that you are looking   down into the sink.  ? Gently place the spout of the saline bottle against 1 of your nostrils.  [facility]  [facility]  Patient Education  ©2018 [employer]  ©2018 [employer]  Step 4. Rinse  ? Breathing through your mouth, gently squeeze the   bottle. This will squirt the solution into your nostril. The   solution will start to drain from the other nostril. Some   may drain from your mouth. This is normal.  ? Use 2 ounces (half of the bottle) on each nostril.  ? Afterwards, you may need to blow your nose gently to   help drain any solution that is left behind.  Step 5. Repeat  ? Repeat steps 3 and 4 with the other nostril.  You can watch a video to learn how to do nasal saline irrigation. Go to [email or web address] and   search for NeilMed Sinus Rinse.  Step 6.  Clean the bottle and cap. Air dry the Sinus Rinse bottle, cap, and tube on a clean paper towel, a lint free towel, or use NeilMed® NasaDOCK® or NasaDOCK plus (sold separately) to store the bottle, cap and tube.  Please read Warnings before using.  Our recommendation is to replace the bottle every three months.      NEILMED CLEAING INSTRUCTIONS    It is very important to keep these devices clean and free from any contamination. Replace the bottle every 3 months.  NasaDock Plus  NasaDock NeilMed® SINUS RINSE Squeeze Bottle: Please perform routine inspections of the bottle and tube for any discolorations and cracks. If there are any visual signs of deterioration or permanent color changes, please clean thoroughly. If the discolorations remain after cleansing, discard the items and purchase new ones. Please follow these instructions after each use of the product. Be sure to replace your product after three months.  Step 1: Rinse the cap, tube and bottle using running water. Fill the bottle with distilled, micro-filtered (through 0.2 micron), reverse osmosis filtered, commercially bottled or previously boiled and cooled down water at lukewarm or body temperature..  Step 2: Add a few drops of dish washing liquid or baby shampoo.  Step 3: Attach the cap and tube to the bottle; hold your finger over  the opening in the cap and shake the bottle vigorously.  Step 4: Squeeze the bottle hard to allow the soapy solution to clean the interior of the tube and the cap. Empty out the bottle completely.  Step 5: Rinse the soap from the bottle, cap and tube thoroughly and place the items on a clean paper towel to dry or use the preferred NasaDOCK® or NasaDOCK plus.    The NasaDOCK® is a simple, hygienic way to dry and store the SINUS RINSE bottle, cap and tube. NasaDOCK® comes with various hanging options and is available in different colors. Our newest model also offers storage for our SINUS RINSE mixture packets. We strongly suggest using NasaDOCK® as an inexpensive, easy way to dry the cap, tube and SINUS RINSE bottle.        Cleaning:  Do not use a  to clean the inside of a bottle. While our bottle is  safe, a  will not adequately clean the SINUS RINSE bottle. The water jets in dishwashers cannot enter the narrow neck of the bottle, and portions of the bottles interior will not be cleaned thoroughly. Additional methods of cleaning the bottle include the use of concentrated white vinegar or isopropyl alcohol (70% concentration), followed by scrubbing and rinsing as described above.       Microwave Disinfection  Clean the device with soap and water as mentioned above and shake off the excess water. Now place the bottle, cap and tube in the microwave for 40 seconds. This will disinfect the bottle, cap and tube. If the microwave has been used recently, please make sure that the inside of the microwave has cooled back down to room temperature before using it to disinfect the bottle.    NeilMed NasaFlo® Neti Pot Users:  Use the same procedure as above.    Sinugator® Cleaning Directions:  Clean the Sinugator® by running plain water and dry with a clean lint free towel and then air dry the unit by keeping it open to the air. The nasal  tip, blue reservoir and white soft  tube can be disinfected by cleaning with soap and water and shaking off the excess water before placing in the home microwave for 60 seconds. Clean the entire unit with a few drops of dishwashing liquid and water every three days to keep the unit clean. As a fi nal rinse to wash off any residual soap or tap water, use either distilled, micro-filtered (through 0.2 micron filter), commercially bottled or previously boiled & cooled down water. Please make sure to rinse thoroughly during each wash so no soap is left behind. DO NOT place the white motor unit in microwave for disinfection. Because of the units stainless steel components, this can cause damage or fire hazards.    General Principles of Maintenance & Storage:  When permissible use a microwave periodically to disinfect devices. Always store NeilMed® products in a cool and dry place with adequate ventilation. NasaDOCK® or NasaDOCK plus offer a simple hygienic way to air dry & neatly store the bottle, cap, tube and NasaFlo. Do not store the bottle with the cap screwed on, unless both are dry. Do not store the wet parts in a sealed plastic bag. If you travel before they are dry, wrap parts separately in paper towels. Hand soap or shampoo can be used for cleaning parts while away from home.        USE ONLY AS DIRECTED, IF SYMPTOMS PERSIST SEE YOUR DOCTOR/HEALTHCARE PROFESSIONAL. ALWAYS READ THE LABEL.        THROAT CLEARING     Why am I clearing my throat so often?   Irritation of the vocal folds and surrounding area can cause the urge to clear your throat. This irritation can be caused by acid reflux, allergies, or environmental factors, as well as from a cold or sore throat. Talk with your doctor about the treatment of possible underlying causes. However, throat clearing can turn into a cycle. You clear your throat after feeling an irritation, which causes more irritation, which causes you to continue clearing your throat, which causes more irritation.      What can I do about it?   Ask a friend or family member to help you keep track - you may not even realize how often you do it.   Replace the throat clearing with a different behavior.   Take a sip of water and then swallow. Repeat until the sensation subsides.  Swallow hard (even without water)   Use the silent throat clear method by making the h sound when you exhale  Breathe in deeply through your nose and exhale on shhh   Hum quietly   Keep yourself hydrated.   Drink plenty of water   Eat wet snacks (grapes, apples, melon, cucumber)   Use a humidifier at home or at work   Suck on hard candies, but avoid too much sugar and avoid anything with mint, menthol, camphor, or eucaplyptus, as these will have a more irritating effect.        ACID REFLUX   What is acid reflux?    When we eat, food passes from the throat and into the stomach through a tube called the esophagus. At the bottom of the esophagus is a ring of muscles that acts as a valve between the esophagus and stomach, called the lower esophageal sphincter. Smoking, alcohol, and certain types of food may weaken the sphincter, so it may stop closing properly. The contents in the stomach then may leak back, or reflux, into the esophagus. This problem is called gastroesophageal reflux disease (GERD). Symptoms of GERD include heartburn, belching, regurgitation of stomach contents, and swallowing difficulties.    Sometimes, the stomach acid travels up through the esophagus and spills into the larynx or pharynx (voice box). This is called laryngopharyngeal reflux (LPR) and is irritating to the vocal folds and surrounding tissues. Often, patients with LPR do not experience heartburn as a symptom. More commonly, symptoms of LPR include hoarseness, excessive mucous resulting in frequent throat clearing, post-nasal drip, coughing, throat soreness or burning, choking episodes, difficulty swallowing, and sensation of a lump in the throat.     How is acid  reflux treated?   Treatment for acid reflux can involve any combination of medication, lifestyle modifications, and surgery.   Medications. Your doctor may prescribe a proton pump inhibitor (PPI) or an H2 blocker. If you are prescribed a PPI, take in on an empty stomach in the morning 30 minutes prior to eating breakfast. Keep in mind that it may take 4-6 weeks before symptoms begin to resolve, so do not stop medications without consulting your doctor.   Lifestyle and dietary modifications. Eat smaller meals at a slower pace. Avoid over-eating. If you are overweight, try to lose weight. Do not lie down or exercise directly after eating; eat your last meal of the day at least 2-3 hours prior to going to sleep. Avoid tight-fitting clothes. If you are a smoker, reduce or quit smoking. Elevate your head of bed 4-6 inches by putting phone books under the legs at the head of your bed or buy a wedge pillow, but do not use more than two regular pillows as this causes the body to curl and compresses your stomach.     Food group Foods to avoid to reduce reflux   Beverages  Whole milk, 2% milk, chocolate milk/hot chocolate, alcohol, coffee (regular and decaf), caffeinated tea, mint tea, carbonated beverages, citrus juice    Breads/grains Commercial sweet rolls, doughnuts, croissants, and other high-fat pastries    Fruits and vegetables Fried or cream-style vegetables, tomatoes, tomato-based products, citrus fruits, hot peppers    Soups and seasonings Cream, cheese, tomato-based soups, vinegar    Meats and proteins Fatty or fried meat/fish, franco, sausage, pepperoni, lunch meat, fried eggs    Fats and oil Lard, franco drippings, salt pork, meat drippings, gravies, highly seasoned salad dressings, nuts    Sweets/desserts Anything made with or from chocolate, peppermint, spearmint, whole milk, or cream; high-fat pastries, gum, hard candy

## 2025-05-12 ENCOUNTER — TELEPHONE (OUTPATIENT)
Dept: DIABETES | Facility: CLINIC | Age: 45
End: 2025-05-12
Payer: COMMERCIAL

## 2025-05-13 ENCOUNTER — TELEPHONE (OUTPATIENT)
Dept: ALLERGY | Facility: CLINIC | Age: 45
End: 2025-05-13
Payer: COMMERCIAL

## 2025-05-13 NOTE — TELEPHONE ENCOUNTER
Spoke with patient and make an New patient appointment with Dr Billings on 06/30/25 @ 930am.  Patient verbalized understanding.

## 2025-05-15 ENCOUNTER — PATIENT MESSAGE (OUTPATIENT)
Dept: OTOLARYNGOLOGY | Facility: CLINIC | Age: 45
End: 2025-05-15
Payer: COMMERCIAL

## 2025-05-15 DIAGNOSIS — J01.91 ACUTE RECURRENT SINUSITIS, UNSPECIFIED LOCATION: ICD-10-CM

## 2025-05-15 DIAGNOSIS — J32.9 CHRONIC SINUSITIS, UNSPECIFIED LOCATION: Primary | ICD-10-CM

## 2025-05-16 RX ORDER — METHYLPREDNISOLONE 4 MG/1
TABLET ORAL
Qty: 21 EACH | Refills: 0 | Status: SHIPPED | OUTPATIENT
Start: 2025-05-16

## 2025-05-26 NOTE — PROGRESS NOTES
Audio Only Telehealth Visit  The patient location is:  Home  The chief complaint leading to consultation is:  Postop follow-up  Visit type: Virtual visit with audio only (telephone)  Total time spent in medical discussion with patient:  5 minutes  Total time spent on date of the encounter:  10 minutes    The reason for the audio only service rather than synchronous audio and video virtual visit was related to technical difficulties or patient preference/necessity.    Each patient to whom I provide medical services by telemedicine is:  (1) informed of the relationship between the physician and patient and the respective role of any other health care provider with respect to management of the patient; and (2) notified that they may decline to receive medical services by telemedicine and may withdraw from such care at any time. Patient verbally consented to receive this service via voice-only telephone call.    HPI:   Leona Tyler presents for a postop visit approximately 6 weeks following:     Surgery: Right middle trigger finger release  Date of Surgery:  4-8-2025  Surgeon: Dr. Lamar     Patient reports no concerns regarding right middle finger.  She states skin has healed up, and digits no longer triggering or painful.  She has returned to normal daily activities without difficulty.     Assessment and plan:    1. S/P trigger finger release          6wks s/p   Right Middle Trigger Finger Release     Discussion:  Patient continues to do well postop.  She has no concerns.  At this point she can advance her activity as tolerated and follow up on an as-needed basis.  Activity: Start using hand for light activities  Follow Up:  P.r.n.    This service was not originating from a related E/M service provided within the previous 7 days nor will  to an E/M service or procedure within the next 24 hours or my soonest available appointment.  Prevailing standard of care was able to be met in this audio-only visit.

## 2025-05-27 ENCOUNTER — OFFICE VISIT (OUTPATIENT)
Dept: ORTHOPEDICS | Facility: CLINIC | Age: 45
End: 2025-05-27
Payer: COMMERCIAL

## 2025-05-27 DIAGNOSIS — Z98.890 S/P TRIGGER FINGER RELEASE: Primary | ICD-10-CM

## 2025-05-29 ENCOUNTER — PATIENT MESSAGE (OUTPATIENT)
Dept: DIABETES | Facility: CLINIC | Age: 45
End: 2025-05-29

## 2025-06-03 ENCOUNTER — OFFICE VISIT (OUTPATIENT)
Dept: OBSTETRICS AND GYNECOLOGY | Facility: CLINIC | Age: 45
End: 2025-06-03
Payer: COMMERCIAL

## 2025-06-03 ENCOUNTER — TELEPHONE (OUTPATIENT)
Dept: DIABETES | Facility: CLINIC | Age: 45
End: 2025-06-03

## 2025-06-03 ENCOUNTER — OFFICE VISIT (OUTPATIENT)
Dept: DIABETES | Facility: CLINIC | Age: 45
End: 2025-06-03
Payer: COMMERCIAL

## 2025-06-03 VITALS
DIASTOLIC BLOOD PRESSURE: 75 MMHG | WEIGHT: 185.19 LBS | HEIGHT: 67 IN | SYSTOLIC BLOOD PRESSURE: 100 MMHG | BODY MASS INDEX: 29.07 KG/M2

## 2025-06-03 VITALS
DIASTOLIC BLOOD PRESSURE: 75 MMHG | WEIGHT: 185 LBS | HEIGHT: 67 IN | BODY MASS INDEX: 29.03 KG/M2 | SYSTOLIC BLOOD PRESSURE: 100 MMHG

## 2025-06-03 DIAGNOSIS — E28.2 PCOS (POLYCYSTIC OVARIAN SYNDROME): ICD-10-CM

## 2025-06-03 DIAGNOSIS — Z01.419 WELL WOMAN EXAM WITH ROUTINE GYNECOLOGICAL EXAM: Primary | ICD-10-CM

## 2025-06-03 DIAGNOSIS — Z71.9 HEALTH EDUCATION/COUNSELING: ICD-10-CM

## 2025-06-03 DIAGNOSIS — Z11.3 SCREENING FOR STDS (SEXUALLY TRANSMITTED DISEASES): ICD-10-CM

## 2025-06-03 DIAGNOSIS — E66.3 OVERWEIGHT (BMI 25.0-29.9): ICD-10-CM

## 2025-06-03 DIAGNOSIS — E10.65 TYPE 1 DIABETES MELLITUS WITH HYPERGLYCEMIA: Primary | ICD-10-CM

## 2025-06-03 DIAGNOSIS — L68.0 HIRSUTISM: ICD-10-CM

## 2025-06-03 DIAGNOSIS — E10.649 TYPE 1 DIABETES MELLITUS WITH HYPOGLYCEMIA AND WITHOUT COMA: ICD-10-CM

## 2025-06-03 DIAGNOSIS — R10.2 PELVIC PAIN IN FEMALE: ICD-10-CM

## 2025-06-03 DIAGNOSIS — Z96.41 INSULIN PUMP IN PLACE: ICD-10-CM

## 2025-06-03 DIAGNOSIS — Z12.31 ENCOUNTER FOR SCREENING MAMMOGRAM FOR MALIGNANT NEOPLASM OF BREAST: ICD-10-CM

## 2025-06-03 DIAGNOSIS — N89.8 VAGINAL DISCHARGE: ICD-10-CM

## 2025-06-03 DIAGNOSIS — E88.819 INSULIN RESISTANCE: ICD-10-CM

## 2025-06-03 DIAGNOSIS — E78.5 DYSLIPIDEMIA, GOAL LDL BELOW 100: ICD-10-CM

## 2025-06-03 DIAGNOSIS — N92.6 IRREGULAR PERIODS/MENSTRUAL CYCLES: ICD-10-CM

## 2025-06-03 PROCEDURE — 87491 CHLMYD TRACH DNA AMP PROBE: CPT | Performed by: OBSTETRICS & GYNECOLOGY

## 2025-06-03 PROCEDURE — 1160F RVW MEDS BY RX/DR IN RCRD: CPT | Mod: CPTII,95,, | Performed by: NURSE PRACTITIONER

## 2025-06-03 PROCEDURE — 81515 NFCT DS BV&VAGINITIS DNA ALG: CPT | Performed by: OBSTETRICS & GYNECOLOGY

## 2025-06-03 PROCEDURE — 99396 PREV VISIT EST AGE 40-64: CPT | Mod: S$GLB,,, | Performed by: OBSTETRICS & GYNECOLOGY

## 2025-06-03 PROCEDURE — 99999 PR PBB SHADOW E&M-EST. PATIENT-LVL IV: CPT | Mod: PBBFAC,,, | Performed by: OBSTETRICS & GYNECOLOGY

## 2025-06-03 PROCEDURE — 3078F DIAST BP <80 MM HG: CPT | Mod: CPTII,S$GLB,, | Performed by: OBSTETRICS & GYNECOLOGY

## 2025-06-03 PROCEDURE — 98006 SYNCH AUDIO-VIDEO EST MOD 30: CPT | Mod: 95,,, | Performed by: NURSE PRACTITIONER

## 2025-06-03 PROCEDURE — 3074F SYST BP LT 130 MM HG: CPT | Mod: CPTII,95,, | Performed by: NURSE PRACTITIONER

## 2025-06-03 PROCEDURE — 3074F SYST BP LT 130 MM HG: CPT | Mod: CPTII,S$GLB,, | Performed by: OBSTETRICS & GYNECOLOGY

## 2025-06-03 PROCEDURE — 3078F DIAST BP <80 MM HG: CPT | Mod: CPTII,95,, | Performed by: NURSE PRACTITIONER

## 2025-06-03 PROCEDURE — 3044F HG A1C LEVEL LT 7.0%: CPT | Mod: CPTII,S$GLB,, | Performed by: OBSTETRICS & GYNECOLOGY

## 2025-06-03 PROCEDURE — 95251 CONT GLUC MNTR ANALYSIS I&R: CPT | Mod: NDTC,,, | Performed by: NURSE PRACTITIONER

## 2025-06-03 PROCEDURE — 1159F MED LIST DOCD IN RCRD: CPT | Mod: CPTII,95,, | Performed by: NURSE PRACTITIONER

## 2025-06-03 PROCEDURE — 3044F HG A1C LEVEL LT 7.0%: CPT | Mod: CPTII,95,, | Performed by: NURSE PRACTITIONER

## 2025-06-03 PROCEDURE — 3008F BODY MASS INDEX DOCD: CPT | Mod: CPTII,S$GLB,, | Performed by: OBSTETRICS & GYNECOLOGY

## 2025-06-03 RX ORDER — INSULIN ASPART 100 [IU]/ML
INJECTION, SOLUTION INTRAVENOUS; SUBCUTANEOUS
Qty: 30 EACH | Refills: 3 | Status: SHIPPED | OUTPATIENT
Start: 2025-06-03

## 2025-06-03 RX ORDER — PRAVASTATIN SODIUM 10 MG/1
10 TABLET ORAL DAILY
Qty: 90 TABLET | Refills: 3 | Status: SHIPPED | OUTPATIENT
Start: 2025-06-03 | End: 2026-06-03

## 2025-06-03 RX ORDER — INSULIN ASPART INJECTION 100 [IU]/ML
INJECTION, SOLUTION SUBCUTANEOUS
Qty: 30 EACH | Refills: 3 | Status: SHIPPED | OUTPATIENT
Start: 2025-06-03

## 2025-06-03 RX ORDER — INSULIN ASPART 100 [IU]/ML
1 INJECTION, SOLUTION SUBCUTANEOUS
Qty: 24 ML | Refills: 11 | Status: SHIPPED | OUTPATIENT
Start: 2025-06-03

## 2025-06-03 RX ORDER — PEN NEEDLE, DIABETIC 30 GX3/16"
NEEDLE, DISPOSABLE MISCELLANEOUS
Qty: 540 EACH | Refills: 3 | Status: SHIPPED | OUTPATIENT
Start: 2025-06-03

## 2025-06-03 RX ORDER — SEMAGLUTIDE 1.34 MG/ML
1 INJECTION, SOLUTION SUBCUTANEOUS
Qty: 1 EACH | Refills: 6 | Status: SHIPPED | OUTPATIENT
Start: 2025-06-03 | End: 2026-06-03

## 2025-06-03 RX ORDER — BLOOD-GLUCOSE SENSOR
1 EACH MISCELLANEOUS
Qty: 9 EACH | Refills: 3 | Status: SHIPPED | OUTPATIENT
Start: 2025-06-03 | End: 2026-06-03

## 2025-06-03 RX ORDER — INSULIN GLARGINE 300 [IU]/ML
40 INJECTION, SOLUTION SUBCUTANEOUS NIGHTLY
Qty: 12 PEN | Refills: 2 | Status: SHIPPED | OUTPATIENT
Start: 2025-06-03

## 2025-06-04 ENCOUNTER — PATIENT MESSAGE (OUTPATIENT)
Dept: OBSTETRICS AND GYNECOLOGY | Facility: CLINIC | Age: 45
End: 2025-06-04
Payer: COMMERCIAL

## 2025-06-04 LAB
BACTERIAL VAGINOSIS DNA (OHS): NOT DETECTED
C TRACH DNA SPEC QL NAA+PROBE: NOT DETECTED
CANDIDA GLABRATA/KRUSEI DNA (OHS): NOT DETECTED
CANDIDA SPECIES DNA (OHS): NOT DETECTED
CTGC SOURCE (OHS) ORD-325: NORMAL
N GONORRHOEA DNA UR QL NAA+PROBE: NOT DETECTED
TRICHOMONAS VAGINALIS DNA (OHS): NOT DETECTED

## 2025-06-08 ENCOUNTER — PATIENT MESSAGE (OUTPATIENT)
Dept: DIABETES | Facility: CLINIC | Age: 45
End: 2025-06-08
Payer: COMMERCIAL

## 2025-06-20 ENCOUNTER — TELEPHONE (OUTPATIENT)
Dept: DIABETES | Facility: CLINIC | Age: 45
End: 2025-06-20
Payer: COMMERCIAL

## 2025-06-23 ENCOUNTER — CLINICAL SUPPORT (OUTPATIENT)
Dept: DIABETES | Facility: CLINIC | Age: 45
End: 2025-06-23
Payer: COMMERCIAL

## 2025-06-23 VITALS — BODY MASS INDEX: 29.03 KG/M2 | HEIGHT: 67 IN | WEIGHT: 185 LBS

## 2025-06-23 DIAGNOSIS — E10.65 TYPE 1 DIABETES MELLITUS WITH HYPERGLYCEMIA: Primary | ICD-10-CM

## 2025-06-23 PROCEDURE — G0108 DIAB MANAGE TRN  PER INDIV: HCPCS | Mod: S$GLB,,, | Performed by: DIETITIAN, REGISTERED

## 2025-06-23 PROCEDURE — 99999 PR PBB SHADOW E&M-EST. PATIENT-LVL IV: CPT | Mod: PBBFAC,,, | Performed by: DIETITIAN, REGISTERED

## 2025-06-23 NOTE — PROGRESS NOTES
"Diabetes Care Specialist Follow-up Note  Author: Kellee Javier RD, [facility]  Date: 6/24/2025    Intake    Program Intake  Reason for Diabetes Program Visit:: Intervention  Type of Intervention:: Individual  Individual: Education  Education: Pattern Management, Self-Management Skill Review  Current diabetes risk level:: low (HgbA1c 7.8)  In the last month, have you used the ER or been admitted to the hospital: No  Permission to speak with others about care:: no    Current Diabetes Treatment: Insulin, DM Injectables  DM Injectables Type/Dose: ozempic 1mg a week  Method of insulin delivery?: Insulin Pump (afrezza also)  Type of Pump: Beta Bionics  Does patient have back-up plan?: Yes (afrezza inhaled insulin, toujeo once a day, and fiasp)  Any problems obtaining supplies?: Yes    Continuous Glucose Monitoring  Patient has CGM: Yes  Personal CGM type:: Dexcom G7  GMI Date: 06/23/25  GMI Value: 7.4 %    Lab Results   Component Value Date    HGBA1C 6.8 (H) 03/31/2025     A1c Pre Diabetes Care Specialist Intervention:  7.8%      Weight: 83.9 kg (185 lb)   Height: 5' 7" (170.2 cm)   Body mass index is 28.98 kg/m².    Physical activity/Exercise:   No change    SMBG: using dexcom G7 with the beta bionic pump                                    Lifestyle Coping Support & Clinical    Lifestyle/Coping/Support  Compared to other people your age, how would you rate your health?: Good  Does anyone in your family have diabetes or does anyone in your family support you in your diabetes care?: FH: mother (T2DM) and father (T1)  List anything about Diabetes that causes you stress?: patient having other health issues that are causing stress, experiencing fewer hypoglycemic episodes since initiating insulin pump therapy  How do you deal with stress/distress?: talking about it  Learning Barriers:: None  Culture or Gnosticist beliefs that may impact ability to access healthcare: No  Psychosocial/Coping Skills Assessment Completed: : " No  Assessment indicates:: Adequate understanding  Deffered due to:: Time  Area of need?: No    Problem Review  Active Comorbidities: Other (comment), Obesity (PCOS)    Diabetes Self-Management Skills Assessment    Medication Skills Assessment  Patient is able to identify current diabetes medications, dosages, and appropriate timing of medications.: yes  Patient reports problems or concerns with current medication regimen.: yes  Medication regimen problems/concerns:: other (see comments) (training on the omnipod 5)  Patient is  aware that some diabetes medications can cause low blood sugar?: Yes  Medication Skills Assessment Completed:: Yes  Assessment indicates:: Instruction Needed, Adequate understanding  Area of need?: Yes    Diabetes Disease Process/Treatment Options  Diabetes Type?: Type I  When were you diagnosed?: age 9  Diabetes Disease Process/Treatment Options: Skills Assessment Completed: No  Assessment indicates:: Adequate understanding  Deferred due to:: Other (comment) (previously completed, there has been no change and patient has adequate understanding)  Area of need?: No    Nutrition/Healthy Eating  Meal Plan 24 Hour Recall - Beverage: water, coffee, and green tea  Who shops/cooks?: self  Nutrition/Healthy Eating Skills Assessment Completed:: No  Assessment indicates:: Adequate understanding  Deffered due to:: Other (comment) (previously completed, there has been no change and patient has adequate understanding)  Area of need?: No    Physical Activity/Exercise  Patient's daily activity level:: lightly active  Patient formally exercises outside of work.: no  Reasons for not exercising:: time constraints  Patient can identify forms of physical activity.: yes  Physical Activity/Exercise Skills Assessment Completed: : No  Assessment indicates:: Adequate understanding  Deffered due to:: Other (comment)  Area of need?: No    Home Blood Glucose Monitoring  Patient states that blood sugar is checked at home  daily.: yes  Monitoring Method:: personal continuous glucose monitor  Personal CGM type:: Dexcom G7   What is your current Time in Range?: 63%  What is your A1c Target?: 7.0  Home Blood Glucose Monitoring Skills Assessment Completed: : Yes  Assessment indicates:: Adequate understanding  Deferred due to:: Other (comment) (previously completed, there has been no change and patient has adequate understanding)  Area of need?: No    Acute Complications  Acute Complications Skills Assessment Completed: : No  Assessment indicates:: Adequate understanding  Deffered due to:: Other (comment) (previously completed, there has been no change and patient has adequate understanding)  Area of need?: No    Chronic Complications  Reviewed health maintenance: yes  Has your doctor examined your feet?: yes  Chronic Complications Skills Assessment Completed: : No  Assessment indicates:: Adequate understanding  Deferred due to:: Other (comment)  Area of need?: No      During today's follow-up visit,  the following areas required further assessment and content was provided/reviewed.    Based on today's diabetes care assessment, the following areas of need were identified:      Identified Areas of Need      Medication/Current Diabetes Treatment: Yes,   -----------------------------  Bionic Report  -----------------------------  Patient Name: Leona Tyler  Date generated: Tue, Jun 24, 2025, 10:00 AM CDT  Reporting period: (14 Days) Sorin 10, 2025 - Jun 24, 2025  13.4 Days of Data Available - (94%)  -----------------------------  Glucose Summary  -----------------------------  GMI: 7.4%  Average: 169.4 mg/dL  Standard Deviation: 63.0 mg/dL  Coefficient of Variation: 37.2%  Median: 161.0 mg/dL  -----------------------------  Time in Range  -----------------------------  Very High: 11.0%  High: 25.5%  In Range: 60.7%  Low: 1.7%  Very Low: 1.2%  CGM Availability: 94.2%    Glucose Target in the last 24 hours of the reporting period:   12:45AM CDT -  Usual  -----------------------------  Meal Announcements  -----------------------------  Meal Announcements Per Day: 3.2  Total Breakfast Meals: 10 (0% Less, 90% Usual, 10% More)  Breakfast Per Day: 0.8  Total Lunch Meals: 15 (0% Less, 87% Usual, 13% More)  Lunch Per Day: 1.2  Total Dinner Meals: 16 (0% Less, 75% Usual, 25% More)  Dinner Per Day: 1.2  -----------------------------  Insulin History  -----------------------------  Body Weight: 185 lbs (84 kgs)  Usual Breakfast: 13.2 units  Usual Lunch: 8 units  Usual Dinner: 8.5 units  Total Daily Basal: 33 units  Total Daily Dose: 76.5 units   Lifestyle Coping/Support: No   Diabetes Disease Process/Treatment Options: No   Nutrition/Healthy Eating: No    Physical Activity/Exercise: No    Home Blood Glucose Monitoring: No    Acute Complications: No    Chronic Complications: No       Today's interventions were provided through individual discussion, instruction, and written materials were provided.    Patient verbalized understanding of instruction and written materials.  Pt was able to return back demonstration of instructions today. Patient understood key points, needs reinforcement and further instruction.     Diabetes Self-Management Care Plan Review and Evaluation of Progress:    During today's follow-up Senas Diabetes Self-Management Care Plan progress was reviewed and progress was evaluated including his/her input. Leona has agreed to continue his/her journey to improve/maintain overall diabetes control by continuing to set health goals. See care plan progress below.      Care Plan: Diabetes Management   Updates made since 6/24/2024 12:00 AM        Problem: Medications Resolved 6/23/2025        Goal: Patient Agrees to take Diabetes Medication(s) fiasp via ilet insulin delivery system as prescribed for the next 3 months. Completed 6/23/2025   Start Date: 1/21/2025   Expected End Date: 4/22/2025   This Visit's Progress: On track   Recent Progress: Deferred    Priority: High   Barriers: No Barriers Identified        Task: Setup ilet beta bionics pump Completed 1/22/2025        Task: Beta Bionics and Dexcom data was downloaded and reviewed with patient and provider, any necessary adjustments were made Completed 6/24/2025        Task: Omnipod and Dexcom data was downloaded and reviewed with patient and provider, any necessary adjustments were made Completed 1/22/2025          Follow Up Plan     Follow up in about 4 weeks (around 7/21/2025) for General Follow-up, Insulin Pump Upload, Personal CGM Upload.    Today's care plan and follow up schedule was discussed with patient.  Leona verbalized understanding of the care plan, goals, and agrees to follow up plan.        The patient was encouraged to communicate with his/her health care provider/physician and care team regarding his/her condition(s) and treatment.  I provided the patient with my contact information today and encouraged to contact me via phone or [facility] Patient Portal as needed.     Length of Visit   Total Time: 60 Minutes

## 2025-06-24 ENCOUNTER — OFFICE VISIT (OUTPATIENT)
Dept: OBSTETRICS AND GYNECOLOGY | Facility: CLINIC | Age: 45
End: 2025-06-24
Payer: COMMERCIAL

## 2025-06-24 VITALS
BODY MASS INDEX: 29 KG/M2 | SYSTOLIC BLOOD PRESSURE: 99 MMHG | WEIGHT: 184.75 LBS | HEIGHT: 67 IN | DIASTOLIC BLOOD PRESSURE: 71 MMHG

## 2025-06-24 DIAGNOSIS — N92.6 IRREGULAR PERIODS/MENSTRUAL CYCLES: Primary | ICD-10-CM

## 2025-06-24 PROCEDURE — 1159F MED LIST DOCD IN RCRD: CPT | Mod: CPTII,S$GLB,, | Performed by: OBSTETRICS & GYNECOLOGY

## 2025-06-24 PROCEDURE — 99213 OFFICE O/P EST LOW 20 MIN: CPT | Mod: S$GLB,,, | Performed by: OBSTETRICS & GYNECOLOGY

## 2025-06-24 PROCEDURE — 3008F BODY MASS INDEX DOCD: CPT | Mod: CPTII,S$GLB,, | Performed by: OBSTETRICS & GYNECOLOGY

## 2025-06-24 PROCEDURE — 3074F SYST BP LT 130 MM HG: CPT | Mod: CPTII,S$GLB,, | Performed by: OBSTETRICS & GYNECOLOGY

## 2025-06-24 PROCEDURE — 3078F DIAST BP <80 MM HG: CPT | Mod: CPTII,S$GLB,, | Performed by: OBSTETRICS & GYNECOLOGY

## 2025-06-24 PROCEDURE — 99999 PR PBB SHADOW E&M-EST. PATIENT-LVL IV: CPT | Mod: PBBFAC,,, | Performed by: OBSTETRICS & GYNECOLOGY

## 2025-06-24 PROCEDURE — 3044F HG A1C LEVEL LT 7.0%: CPT | Mod: CPTII,S$GLB,, | Performed by: OBSTETRICS & GYNECOLOGY

## 2025-06-24 RX ORDER — NORGESTIMATE AND ETHINYL ESTRADIOL 0.25-0.035
KIT ORAL
Qty: 84 TABLET | Refills: 4 | Status: SHIPPED | OUTPATIENT
Start: 2025-06-24

## 2025-06-24 NOTE — PROGRESS NOTES
"Subjective     Patient ID: Leona Tyler is a 44 y.o. female.    Chief Complaint:  Follow-up      History of Present Illness  HPI  44 y.o.  presents with c/o irregular bleeding, s/p ablation.  Very frustrated by bleeding and now desires hysterectomy.  She also feels this is the result of a hormone imbalance.  Has not had labs drawn yet.  No other complaints today.  Has failed IUD in the past.  H/o BTL.  Denies CP/SOB/Dizziness.  Nonsmoker.      GYN & OB History  Patient's last menstrual period was 2025 (exact date).   Date of Last Pap: 2023    OB History    Para Term  AB Living   2 2 0 2  3   SAB IAB Ectopic Multiple Live Births      1 3      # Outcome Date GA Lbr Pillo/2nd Weight Sex Type Anes PTL Lv   2A  21    M CS-Classical   JEFF   2B  21    M CS-Classical   JEFF   1  17    M CS-Unspec  Y JEFF       Review of Systems  Review of Systems   Constitutional:  Negative for chills and fever.   Respiratory:  Negative for shortness of breath.    Cardiovascular:  Negative for chest pain.   Gastrointestinal:  Negative for abdominal pain, constipation and diarrhea.   Genitourinary:  Positive for menstrual problem and vaginal bleeding. Negative for dyspareunia, pelvic pain and vaginal discharge.   Musculoskeletal:  Negative for myalgias.   Neurological:  Negative for headaches.          Objective   Physical Exam    BP 99/71   Ht 5' 7" (1.702 m)   Wt 83.8 kg (184 lb 11.9 oz)   LMP 2025 (Exact Date)   BMI 28.94 kg/m²     Gen: NAD  Resp: Normal respiratory effort  Pelvic: deferred  Ext: normal ROM  Psych: appropriate affect  Neuro: grossly intact         Assessment and Plan     Leona was seen today for follow-up.    Diagnoses and all orders for this visit:    Irregular periods/menstrual cycles  -     TSH; Future    Other orders  -     norgestimate-ethinyl estradioL (ORTHO-CYCLEN) 0.25-0.035 mg per tablet; Take one tablet by mouth daily.  Skip " placebo pills and start next pack immediately, in order to skip cycle.          Plan:  Discussed with patient - failed ablation, can proceed with hysterectomy.    Will draw hormone labs today and get pelvic US.   Discussed hormonal treatment with OCPs, and she is interested.  If this helps her bleeding may not need hysterectomy; but if continues to bleed will proceed with hyst.   Counseling done, precautions given, all questions answered.  RTC for f/u on OCPs.

## 2025-06-25 ENCOUNTER — PATIENT MESSAGE (OUTPATIENT)
Dept: DIABETES | Facility: CLINIC | Age: 45
End: 2025-06-25
Payer: COMMERCIAL

## 2025-06-25 ENCOUNTER — TELEPHONE (OUTPATIENT)
Dept: DIABETES | Facility: CLINIC | Age: 45
End: 2025-06-25
Payer: COMMERCIAL

## 2025-06-25 NOTE — TELEPHONE ENCOUNTER
Please call patient let her know that I did review the beta bionic download from Kellee  Her readings do look a little bit better than her recent visit  I am wondering if the timing is off on some of her mealtime administration  It also looks like sometimes she boluses back to back for the same meal  So she may need to work on heating larger meals if she is eating more than she normally eats  We could also consider lowering her target---to the lower target to see if that helps improve the blood sugar readings  Based on the download Kellee gave me her 2 week average her estimated A1c is 7.4% and her average blood sugar is 169  Moving in the right direction

## 2025-06-30 ENCOUNTER — PATIENT MESSAGE (OUTPATIENT)
Dept: OBSTETRICS AND GYNECOLOGY | Facility: CLINIC | Age: 45
End: 2025-06-30
Payer: COMMERCIAL

## 2025-07-07 ENCOUNTER — TELEPHONE (OUTPATIENT)
Dept: OBSTETRICS AND GYNECOLOGY | Facility: CLINIC | Age: 45
End: 2025-07-07
Payer: COMMERCIAL

## 2025-07-09 RX ORDER — INSULIN PMP CART,AUT,G6/7,CNTR
EACH SUBCUTANEOUS
Qty: 15 EACH | Refills: 11 | OUTPATIENT
Start: 2025-07-09

## 2025-07-09 NOTE — TELEPHONE ENCOUNTER
Spoke to pt stated that she is wearing beta bionic pump, stated that she removed omnipod pods off of medication list but that pharmacy keep sending request, asked for us to remove

## 2025-07-15 ENCOUNTER — PATIENT MESSAGE (OUTPATIENT)
Dept: DIABETES | Facility: CLINIC | Age: 45
End: 2025-07-15
Payer: COMMERCIAL

## 2025-07-15 DIAGNOSIS — E10.65 TYPE 1 DIABETES MELLITUS WITH HYPERGLYCEMIA: ICD-10-CM

## 2025-07-15 RX ORDER — INSULIN ASPART INJECTION 100 [IU]/ML
INJECTION, SOLUTION SUBCUTANEOUS
Qty: 30 EACH | Refills: 3 | Status: SHIPPED | OUTPATIENT
Start: 2025-07-15

## 2025-07-15 RX ORDER — INSULIN GLARGINE 300 [IU]/ML
40 INJECTION, SOLUTION SUBCUTANEOUS NIGHTLY
Qty: 12 PEN | Refills: 2 | Status: SHIPPED | OUTPATIENT
Start: 2025-07-15

## 2025-07-21 ENCOUNTER — TELEPHONE (OUTPATIENT)
Dept: DIABETES | Facility: CLINIC | Age: 45
End: 2025-07-21
Payer: COMMERCIAL

## 2025-08-01 ENCOUNTER — TELEPHONE (OUTPATIENT)
Dept: DIABETES | Facility: CLINIC | Age: 45
End: 2025-08-01
Payer: COMMERCIAL

## 2025-08-04 ENCOUNTER — OFFICE VISIT (OUTPATIENT)
Dept: DIABETES | Facility: CLINIC | Age: 45
End: 2025-08-04
Payer: COMMERCIAL

## 2025-08-04 VITALS — HEART RATE: 78 BPM | BODY MASS INDEX: 29.76 KG/M2 | OXYGEN SATURATION: 98 % | WEIGHT: 190 LBS

## 2025-08-04 DIAGNOSIS — Z96.41 INSULIN PUMP IN PLACE: ICD-10-CM

## 2025-08-04 DIAGNOSIS — E10.649 HYPOGLYCEMIA UNAWARENESS ASSOCIATED WITH TYPE 1 DIABETES MELLITUS: ICD-10-CM

## 2025-08-04 DIAGNOSIS — E66.3 OVERWEIGHT (BMI 25.0-29.9): ICD-10-CM

## 2025-08-04 DIAGNOSIS — E10.65 TYPE 1 DIABETES MELLITUS WITH HYPERGLYCEMIA: Primary | ICD-10-CM

## 2025-08-04 DIAGNOSIS — Z71.9 HEALTH EDUCATION/COUNSELING: ICD-10-CM

## 2025-08-04 DIAGNOSIS — E10.649 TYPE 1 DIABETES MELLITUS WITH HYPOGLYCEMIA AND WITHOUT COMA: ICD-10-CM

## 2025-08-04 DIAGNOSIS — E88.819 INSULIN RESISTANCE: ICD-10-CM

## 2025-08-04 DIAGNOSIS — E28.2 PCOS (POLYCYSTIC OVARIAN SYNDROME): ICD-10-CM

## 2025-08-04 DIAGNOSIS — E78.5 DYSLIPIDEMIA, GOAL LDL BELOW 100: ICD-10-CM

## 2025-08-04 DIAGNOSIS — Z46.81 INSULIN PUMP FITTING OR ADJUSTMENT: ICD-10-CM

## 2025-08-04 PROCEDURE — 1159F MED LIST DOCD IN RCRD: CPT | Mod: CPTII,S$GLB,, | Performed by: NURSE PRACTITIONER

## 2025-08-04 PROCEDURE — 3044F HG A1C LEVEL LT 7.0%: CPT | Mod: CPTII,S$GLB,, | Performed by: NURSE PRACTITIONER

## 2025-08-04 PROCEDURE — 99214 OFFICE O/P EST MOD 30 MIN: CPT | Mod: S$GLB,,, | Performed by: NURSE PRACTITIONER

## 2025-08-04 PROCEDURE — 3008F BODY MASS INDEX DOCD: CPT | Mod: CPTII,S$GLB,, | Performed by: NURSE PRACTITIONER

## 2025-08-04 PROCEDURE — 95251 CONT GLUC MNTR ANALYSIS I&R: CPT | Mod: S$GLB,,, | Performed by: NURSE PRACTITIONER

## 2025-08-04 PROCEDURE — 1160F RVW MEDS BY RX/DR IN RCRD: CPT | Mod: CPTII,S$GLB,, | Performed by: NURSE PRACTITIONER

## 2025-08-04 PROCEDURE — 99999 PR PBB SHADOW E&M-EST. PATIENT-LVL IV: CPT | Mod: PBBFAC,,, | Performed by: NURSE PRACTITIONER

## 2025-08-04 RX ORDER — SEMAGLUTIDE 1.34 MG/ML
1 INJECTION, SOLUTION SUBCUTANEOUS
Qty: 1 EACH | Refills: 6 | Status: SHIPPED | OUTPATIENT
Start: 2025-08-04 | End: 2026-08-04

## 2025-08-04 RX ORDER — INSULIN ASPART 100 [IU]/ML
1 INJECTION, SOLUTION SUBCUTANEOUS
Qty: 24 ML | Refills: 11 | Status: SHIPPED | OUTPATIENT
Start: 2025-08-04

## 2025-08-04 RX ORDER — PRAVASTATIN SODIUM 10 MG/1
10 TABLET ORAL DAILY
Qty: 90 TABLET | Refills: 3 | Status: SHIPPED | OUTPATIENT
Start: 2025-08-04 | End: 2026-08-04

## 2025-08-04 RX ORDER — GLUCAGON 3 MG/1
POWDER NASAL
Qty: 2 EACH | Refills: 1 | Status: SHIPPED | OUTPATIENT
Start: 2025-08-04

## 2025-08-04 RX ORDER — BLOOD-GLUCOSE SENSOR
1 EACH MISCELLANEOUS
Qty: 9 EACH | Refills: 3 | Status: SHIPPED | OUTPATIENT
Start: 2025-08-04 | End: 2026-08-04

## 2025-08-04 NOTE — ASSESSMENT & PLAN NOTE
Body mass index is 29.76 kg/m².  Increases insulin resistance.   Discussed DM diet and exercise.   On Ozempic

## 2025-08-04 NOTE — ASSESSMENT & PLAN NOTE
Uncontrolled   Still suffering with significant prandial excursions with being on the beta bionics  We discussed some of it could be the timing of her bolus administration  We also discussed if she is eating higher fat higher carb food she may need to select more than usual for her bolus  She is wearing the beta by ionic insulin pump  We discussed troubleshooting    She is on the Ozempic  Denies hx of pancreatitis   Has significant insulin resistance   May consider adding in metformin extended release in the future if needed  Hesitant to use S GLT2 since she has had DKA multiple times in the past---she may be ketone prone          -- Medication Changes:     Continue Ozempic 1 mg weekly     Continue Beta Bionic insulin  pump   Continue Dexcom G7  Her Dexcom supplies come from [employer] and they needed updated chart notes  She is using Fiasp pumpcart for pump     Today we lowered her blood sugar target in the beta by ionic insulin pump    She has her backup insulin in case she is off her pump  We reviewed her backup insulin plan for while she is off the beta  Continue the Toujeo 40 units daily  Continue either Fiasp or Novolog injections with meals---recommend that she tighten her insulin to carbohydrate ratio to 1:6 TID AC       -- Reviewed goals of therapy are to get the best control we can without hypoglycemia.  -- Reviewed patient's current insulin regimen. Clarified proper insulin dose and timing in relation to meals, etc. Insulin injection sites and proper rotation instructed.   -- Advised frequent self blood glucose monitoring.  Patient encouraged to document glucose results and bring them to every clinic visit.-- continue dexcom G7 personal CGM- supplies from [employer]--  -- Hypoglycemia precautions discussed. Instructed on precautions before driving.    -- Call for Bg repeatedly < 70 or > 200.   -- Close adherence to lifestyle changes recommended.   -- Periodic follow ups for eye  evaluations, foot care and dental care suggested.  ---follow up with diabetes education as needed      Patient has diabetes mellitus and manages diabetes with intensive insulin regimen and uses prandial and basal insulin daily  Patient requires a therapeutic CGM and is willing to use therapeutic CGM for the necessary frequent adjustments of insulin therapy.  I have completed an in-person visit during the previous 6 months and will continue to have in-person visits every 6 months to assess adherence to their CGM regimen and diabetes treatment plan.  Due to COVID pandemic and need for remote monitoring this tool is medically necessary

## 2025-08-04 NOTE — PROGRESS NOTES
CC:   Chief Complaint   Patient presents with    Diabetes Mellitus       HPI: Leona Tyler is a 44 y.o. female presents for a follow up visit today for the management of T1DM     She was diagnosed with Type 1 diabetes at 9 years old. She did use a [employer] pump shortly after but stopped d/t poor glucose control and frequent severe hypoglycemia   Moved to [place] in late 2022-- was in [place]       Family hx of diabetes: mother (T2DM)  and father (T1)   Hospitalized for diabetes: Yes - for DKA several times; last episode was ~ 2008 while on insulin pump.  Insulin therapy: att he time of diagnosis     No personal or FH of thyroid cancer or personal of pancreatic cancer or pancreatitis.    Was started on Afreeza in [place]= she feels like since she started using the Afreeza- she has issues with her throat- constant coughing - tickle in the throat       -- federal     Almost 6 year old son   4 year old twins - boys       Our last visit was in June 2025 -- virtual visit   At that visit we   -- Medication Changes:      Continued Ozempic 1 mg weekly      Get back on Beta Bionic insulin  pump   She needs to get back on her Dexcom G7  Her Dexcom supplies come from [facility] and they needed updated chart notes  She is using Fiasp insulin in the pump   Rx for Fiasp pumpcart to pharmacy         We reviewed her backup insulin plan for while she is off the beta  Continue the Toujeo 40 units daily  Continue either Fiasp or Novolog injections with meals---recommend that she tighten her insulin to carbohydrate ratio to 1:6 TID AC         See attached dexcom and Beta Bionic download     She likes the Beta better than the Omnipod                                       DIABETES COMPLICATIONS: none      Diabetes Management Status    ASA:  No    Statin: Taking pravastatin 10 mg nightly    ACE/ARB: Not taking    Screening or Prevention Patient's value Goal Complete/Controlled?   HgA1C Testing and Control    "Lab Results   Component Value Date    HGBA1C 6.8 (H) 03/31/2025      Annually/Less than 8% No   Lipid profile : 03/31/2025 Annually No   LDL control Lab Results   Component Value Date    LDLCALC 99.8 03/31/2025    Annually/Less than 100 mg/dl  No   Nephropathy screening Lab Results   Component Value Date    LABMICR 18.0 10/29/2024     No results found for: "PROTEINUA" Annually No   Blood pressure BP Readings from Last 1 Encounters:   06/24/25 99/71    Less than 140/90 Yes   Dilated retinal exam Most Recent Eye Exam Date: Not Found Annually Yes   Foot exam   : 08/04/2025 Annually No       CURRENT A1C:    Hemoglobin A1C   Date Value Ref Range Status   10/29/2024 7.1 (H) 4.0 - 5.6 % Final     Comment:     ADA Screening Guidelines:  5.7-6.4%  Consistent with prediabetes  >or=6.5%  Consistent with diabetes    High levels of fetal hemoglobin interfere with the HbA1C  assay. Heterozygous hemoglobin variants (HbS, HgC, etc)do  not significantly interfere with this assay.   However, presence of multiple variants may affect accuracy.     06/14/2024 7.7 (H) 4.0 - 5.6 % Final     Comment:     ADA Screening Guidelines:  5.7-6.4%  Consistent with prediabetes  >or=6.5%  Consistent with diabetes    High levels of fetal hemoglobin interfere with the HbA1C  assay. Heterozygous hemoglobin variants (HbS, HgC, etc)do  not significantly interfere with this assay.   However, presence of multiple variants may affect accuracy.     01/03/2024 7.8 (H) 4.0 - 5.6 % Final     Comment:     ADA Screening Guidelines:  5.7-6.4%  Consistent with prediabetes  >or=6.5%  Consistent with diabetes    High levels of fetal hemoglobin interfere with the HbA1C  assay. Heterozygous hemoglobin variants (HbS, HgC, etc)do  not significantly interfere with this assay.   However, presence of multiple variants may affect accuracy.       Hemoglobin A1c   Date Value Ref Range Status   03/31/2025 6.8 (H) 4.0 - 5.6 % Final     Comment:     ADA Screening " Guidelines:  5.7-6.4%  Consistent with prediabetes  >=6.5%  Consistent with diabetes    High levels of fetal hemoglobin interfere with the HbA1C  assay. Heterozygous hemoglobin variants (HbS, HgC, etc)do  not significantly interfere with this assay.   However, presence of multiple variants may affect accuracy.       GOAL A1C: 6.5% without hypoglycemia       DM MEDICATIONS USED IN THE PAST: Basaglar   Fiasp   Afreeza -sore throat   Xultophy   Dexcom G6   Karina 2   Levemir   Lantus   Novolog   Humalog   Regular   NPH   Toujeo   Lyumjev   Omnipod 5   Ozempic   Metformin   Dexcom G7   Beta bionics         CURRENT DIABETES MEDICATIONS: ozempic 1 mg weekly --    Insulin Pump: Beta bionic insulin pump- with Fiasp insulin       Back up Lantus/Levemir: Toujeo      See attached pump download     TDD 74.2 units TDD   Total basal - 32.4 units         BLOOD GLUCOSE MONITORING:    Sensor type: Dexcom G7 -  Average BG readin  Time in range:  53%   25% high   17% very high   3% low   2% very low   Estimated A1c is 7.6%   Site change:q10 days      2 weeks prior - 179 average   48% in range   GMI- 7.6%       Supplies: [employer]     Sensor was downloaded in clinic today and reviewed with patient.   Please see attached document for download.             HYPOGLYCEMIA:  Yes 3% low   2% very low   Glucagon kit:Baqsimi   Medic alert bracelet: denies         MEALS: eating 3 meals per day   Drinks: water and coffee and green tea          CURRENT EXERCISE:  none over the last couple of weeks but started back on Saturday         Review of Systems  Review of Systems   Constitutional:  Negative for appetite change and fatigue.   HENT:  Negative for trouble swallowing.    Eyes:  Negative for visual disturbance.   Respiratory:  Negative for shortness of breath.    Cardiovascular:  Negative for chest pain.   Gastrointestinal:  Negative for abdominal pain, constipation, nausea and vomiting.        + GERD    Endocrine: Negative for  polydipsia, polyphagia and polyuria.   Genitourinary:         No Nocturia    Skin:  Negative for wound.   Neurological:  Negative for numbness.       Physical Exam   Physical Exam  Vitals and nursing note reviewed.   Constitutional:       General: She is not in acute distress.     Appearance: She is well-developed and normal weight. She is not ill-appearing.      Comments: Overweight    HENT:      Head: Normocephalic and atraumatic.      Right Ear: External ear normal.      Left Ear: External ear normal.      Nose: Nose normal.   Neck:      Thyroid: No thyromegaly.      Trachea: No tracheal deviation.   Cardiovascular:      Rate and Rhythm: Normal rate and regular rhythm.      Heart sounds: No murmur heard.  Pulmonary:      Effort: Pulmonary effort is normal. No respiratory distress.      Breath sounds: Normal breath sounds.   Abdominal:      Palpations: Abdomen is soft.      Tenderness: There is no abdominal tenderness.      Hernia: No hernia is present.   Musculoskeletal:      Cervical back: Normal range of motion and neck supple.   Skin:     General: Skin is warm and dry.      Capillary Refill: Capillary refill takes less than 2 seconds.      Findings: No rash.      Comments: Dexcom sites and insulin pump sites are normal appearing. No lipo hypertropthy or atrophy     Neurological:      General: No focal deficit present.      Mental Status: She is alert and oriented to person, place, and time.      Cranial Nerves: No cranial nerve deficit.   Psychiatric:         Mood and Affect: Mood normal.         Behavior: Behavior normal.         Thought Content: Thought content normal.         Judgment: Judgment normal.           FOOT EXAMINATION: Appropriate footwear     Protective Sensation (w/ 10 gram monofilament):  Right: Intact  Left: Intact    Visual Inspection:  Normal -  Bilateral and Nails Intact - without Evidence of Foot Deformity- Bilateral    Pedal Pulses:   Right: Present  Left: Present    Posterior Tibialis  Pulses:   Right:Present  Left: Present          Lab Results   Component Value Date    TSH 1.331 10/29/2024           Type 1 diabetes mellitus with hyperglycemia  Uncontrolled   Still suffering with significant prandial excursions with being on the beta bionics  We discussed some of it could be the timing of her bolus administration  We also discussed if she is eating higher fat higher carb food she may need to select more than usual for her bolus  She is wearing the beta by ionic insulin pump  We discussed troubleshooting    She is on the Ozempic  Denies hx of pancreatitis   Has significant insulin resistance   May consider adding in metformin extended release in the future if needed  Hesitant to use S GLT2 since she has had DKA multiple times in the past---she may be ketone prone          -- Medication Changes:     Continue Ozempic 1 mg weekly     Continue Beta Bionic insulin  pump   Continue Dexcom G7  Her Dexcom supplies come from [employer] and they needed updated chart notes  She is using Fiasp pumpcart for pump     Today we lowered her blood sugar target in the beta by ionic insulin pump    She has her backup insulin in case she is off her pump  We reviewed her backup insulin plan for while she is off the beta  Continue the Toujeo 40 units daily  Continue either Fiasp or Novolog injections with meals---recommend that she tighten her insulin to carbohydrate ratio to 1:6 TID AC       -- Reviewed goals of therapy are to get the best control we can without hypoglycemia.  -- Reviewed patient's current insulin regimen. Clarified proper insulin dose and timing in relation to meals, etc. Insulin injection sites and proper rotation instructed.   -- Advised frequent self blood glucose monitoring.  Patient encouraged to document glucose results and bring them to every clinic visit.-- continue dexcom G7 personal CGM- supplies from [employer]--  -- Hypoglycemia precautions discussed. Instructed on  precautions before driving.    -- Call for Bg repeatedly < 70 or > 200.   -- Close adherence to lifestyle changes recommended.   -- Periodic follow ups for eye evaluations, foot care and dental care suggested.  ---follow up with diabetes education as needed      Patient has diabetes mellitus and manages diabetes with intensive insulin regimen and uses prandial and basal insulin daily  Patient requires a therapeutic CGM and is willing to use therapeutic CGM for the necessary frequent adjustments of insulin therapy.  I have completed an in-person visit during the previous 6 months and will continue to have in-person visits every 6 months to assess adherence to their CGM regimen and diabetes treatment plan.  Due to COVID pandemic and need for remote monitoring this tool is medically necessary          Type 1 diabetes mellitus with hypoglycemia  Reviewed hypoglycemia management: Treat with 1/2 glass of juice, 1/2 can regular coke, or 4 glucose tablets.   Monitor and repeat treatment every 15 minutes until BG is >70   Then have a snack, which includes a complex carbohydrate and protein.    Has Baqsimi   Recommend medic alert bracelet     Adjusted her target in her pump   Reviewed bolus timing    Overweight (BMI 25.0-29.9)  Body mass index is 29.76 kg/m².  Increases insulin resistance.   Discussed DM diet and exercise.   On Ozempic    Insulin pump fitting or adjustment  See above     Insulin pump in place  See above     PCOS (polycystic ovarian syndrome)  Increases insulin resistance             I spent a total of 30 minutes on the day of the visit.This includes face to face time and non-face to face time preparing to see the patient (eg, review of tests), obtaining and/or reviewing separately obtained history, documenting clinical information in the electronic or other health record, independently interpreting results and communicating results to the patient/family/caregiver, or care coordinator.          Pump backup  plan    If the insulin pump is non functional and discontinued for anticipated more than 20 hours, please give daily injections of:   Long acting insulin Toujeo 40 units daily   Short acting insulin Fiasp  for meals according to carb ratios and sensitivity factor in the pump.     When the insulin pump is restarted, do not restart basal rates until at least 22 hours after the last long acting insulin injection. You can set a 0% temporary basal setting that will last until this time and use your pump to bolus for meals and correction.     For any technical insulin pump issues, please contact the insulin pump company; the toll free number is printed on the label on the back of the insulin pump.       If your sugar is running high for a few hours and does not respond to two correction doses from the insulin pump, it may mean that you have a bad pump site and the site should be changed             Follow up in about 3 months (around 11/4/2025).  1. Labs today -- mine only   2. See me in 3 months with labs prior- will order after these labs           Orders Placed This Encounter   Procedures    TSH     Standing Status:   Future     Expected Date:   8/4/2025     Expiration Date:   2/4/2027     Send normal result to authorizing provider's In Basket if patient is active on MyChart::   Yes    Hemoglobin A1C     Standing Status:   Future     Number of Occurrences:   1     Expected Date:   11/4/2025     Expiration Date:   2/4/2027    Basic Metabolic Panel     Standing Status:   Future     Number of Occurrences:   1     Expected Date:   11/4/2025     Expiration Date:   2/4/2027       Recommendations were discussed with the patient in detail  The patient verbalized understanding and agrees with the plan outlined as above.     This note was partly generated with [employer] voice recognition software. I apologize for any possible typographical errors.

## 2025-08-04 NOTE — ASSESSMENT & PLAN NOTE
Reviewed hypoglycemia management: Treat with 1/2 glass of juice, 1/2 can regular coke, or 4 glucose tablets.   Monitor and repeat treatment every 15 minutes until BG is >70   Then have a snack, which includes a complex carbohydrate and protein.    Has Pee   Recommend medic alert bracelet     Adjusted her target in her pump   Reviewed bolus timing

## 2025-08-05 ENCOUNTER — HOSPITAL ENCOUNTER (OUTPATIENT)
Dept: RADIOLOGY | Facility: HOSPITAL | Age: 45
Discharge: HOME | End: 2025-08-05
Attending: OBSTETRICS & GYNECOLOGY
Payer: COMMERCIAL

## 2025-08-05 ENCOUNTER — RESULTS FOLLOW-UP (OUTPATIENT)
Dept: DIABETES | Facility: CLINIC | Age: 45
End: 2025-08-05
Payer: COMMERCIAL

## 2025-08-05 DIAGNOSIS — Z12.31 ENCOUNTER FOR SCREENING MAMMOGRAM FOR MALIGNANT NEOPLASM OF BREAST: ICD-10-CM

## 2025-08-05 DIAGNOSIS — E10.65 TYPE 1 DIABETES MELLITUS WITH HYPERGLYCEMIA: Primary | ICD-10-CM

## 2025-08-05 PROCEDURE — 77067 SCR MAMMO BI INCL CAD: CPT | Mod: TC

## 2025-08-15 ENCOUNTER — PATIENT MESSAGE (OUTPATIENT)
Dept: OBSTETRICS AND GYNECOLOGY | Facility: CLINIC | Age: 45
End: 2025-08-15
Payer: COMMERCIAL

## (undated) DEVICE — DRAPE HAND STERILE

## (undated) DEVICE — SOL IRR 0.9% NACL 500ML PB

## (undated) DEVICE — APPLICATOR CHLORAPREP ORN 26ML

## (undated) DEVICE — DRAPE THREE-QTR REINF 53X77IN

## (undated) DEVICE — GLOVE SENSICARE PI MICRO 7.5

## (undated) DEVICE — STOCKINET 4INX48

## (undated) DEVICE — PENCIL SMK EVAC CONNECTOR 10FT

## (undated) DEVICE — MANIFOLD 4 PORT

## (undated) DEVICE — PACK ECLIPSE BASIC III SURG

## (undated) DEVICE — COVER LIGHT HANDLE 80/CA

## (undated) DEVICE — BLADE SCALP OPHTL BEVEL STR

## (undated) DEVICE — BLADE SURG #15 CARBON STEEL

## (undated) DEVICE — BANDAGE MATRIX HK LOOP 2IN 5YD

## (undated) DEVICE — DRESSING XEROFORM NONADH 1X8IN

## (undated) DEVICE — PAD CAST 2 IN X 4YDS STERILE

## (undated) DEVICE — GOWN POLY REINF BRTH SLV XL

## (undated) DEVICE — ALCOHOL 70% ISOP W/GREEN 16OZ

## (undated) DEVICE — GOWN POLY REINF BRTH SLV LG

## (undated) DEVICE — SPONGE COTTON TRAY 4X4IN

## (undated) DEVICE — PACK SURGERY START

## (undated) DEVICE — GLOVE SURG BIOGEL LATEX SZ 7.5

## (undated) DEVICE — TOURNIQUET SB QC DP 18X4IN

## (undated) DEVICE — SYR 10CC LUER LOCK

## (undated) DEVICE — NDL HYPO REG 25G X 1 1/2

## (undated) DEVICE — PAD PREP CUFFED NS 24X48IN

## (undated) DEVICE — SUT ETHILON 5-0 PS-2 18IN

## (undated) DEVICE — BANDAGE ESMARK ELASTIC ST 4X9